# Patient Record
Sex: FEMALE | Race: WHITE | Employment: OTHER | URBAN - METROPOLITAN AREA
[De-identification: names, ages, dates, MRNs, and addresses within clinical notes are randomized per-mention and may not be internally consistent; named-entity substitution may affect disease eponyms.]

---

## 2019-01-11 ENCOUNTER — TRANSFERRED RECORDS (OUTPATIENT)
Dept: HEALTH INFORMATION MANAGEMENT | Facility: CLINIC | Age: 74
End: 2019-01-11

## 2019-04-18 ENCOUNTER — TRANSFERRED RECORDS (OUTPATIENT)
Dept: HEALTH INFORMATION MANAGEMENT | Facility: CLINIC | Age: 74
End: 2019-04-18

## 2019-05-22 ENCOUNTER — TRANSFERRED RECORDS (OUTPATIENT)
Dept: HEALTH INFORMATION MANAGEMENT | Facility: CLINIC | Age: 74
End: 2019-05-22

## 2019-05-22 ENCOUNTER — MEDICAL CORRESPONDENCE (OUTPATIENT)
Dept: HEALTH INFORMATION MANAGEMENT | Facility: CLINIC | Age: 74
End: 2019-05-22

## 2019-05-23 ENCOUNTER — MEDICAL CORRESPONDENCE (OUTPATIENT)
Dept: HEALTH INFORMATION MANAGEMENT | Facility: CLINIC | Age: 74
End: 2019-05-23

## 2019-06-06 ENCOUNTER — OFFICE VISIT (OUTPATIENT)
Dept: OPHTHALMOLOGY | Facility: CLINIC | Age: 74
DRG: 057 | End: 2019-06-06
Attending: OPHTHALMOLOGY
Payer: MEDICARE

## 2019-06-06 ENCOUNTER — ANCILLARY PROCEDURE (OUTPATIENT)
Dept: GENERAL RADIOLOGY | Facility: CLINIC | Age: 74
End: 2019-06-06
Attending: OPHTHALMOLOGY
Payer: MEDICARE

## 2019-06-06 DIAGNOSIS — H15.102 SCLERITIS AND EPISCLERITIS OF LEFT EYE: ICD-10-CM

## 2019-06-06 DIAGNOSIS — H15.002 SCLERITIS AND EPISCLERITIS OF LEFT EYE: ICD-10-CM

## 2019-06-06 DIAGNOSIS — H20.9 UVEITIS OF LEFT EYE: Primary | ICD-10-CM

## 2019-06-06 DIAGNOSIS — H25.13 NUCLEAR AGE-RELATED CATARACT, BOTH EYES: ICD-10-CM

## 2019-06-06 DIAGNOSIS — H20.9 UVEITIS OF LEFT EYE: ICD-10-CM

## 2019-06-06 DIAGNOSIS — H16.9 KERATITIS, BILATERAL: ICD-10-CM

## 2019-06-06 PROBLEM — H90.6 MIXED HEARING LOSS, BILATERAL: Status: ACTIVE | Noted: 2019-02-19

## 2019-06-06 PROCEDURE — 71046 X-RAY EXAM CHEST 2 VIEWS: CPT

## 2019-06-06 PROCEDURE — 86780 TREPONEMA PALLIDUM: CPT | Performed by: OPHTHALMOLOGY

## 2019-06-06 PROCEDURE — 86592 SYPHILIS TEST NON-TREP QUAL: CPT | Performed by: OPHTHALMOLOGY

## 2019-06-06 PROCEDURE — 92250 FUNDUS PHOTOGRAPHY W/I&R: CPT | Mod: ZF | Performed by: OPHTHALMOLOGY

## 2019-06-06 PROCEDURE — 76512 OPH US DX B-SCAN: CPT | Mod: ZF | Performed by: OPHTHALMOLOGY

## 2019-06-06 PROCEDURE — 86780 TREPONEMA PALLIDUM: CPT | Mod: 90 | Performed by: OPHTHALMOLOGY

## 2019-06-06 PROCEDURE — 36415 COLL VENOUS BLD VENIPUNCTURE: CPT | Performed by: OPHTHALMOLOGY

## 2019-06-06 PROCEDURE — 92285 EXTERNAL OCULAR PHOTOGRAPHY: CPT | Mod: ZF | Performed by: OPHTHALMOLOGY

## 2019-06-06 PROCEDURE — 86481 TB AG RESPONSE T-CELL SUSP: CPT | Performed by: OPHTHALMOLOGY

## 2019-06-06 PROCEDURE — 99000 SPECIMEN HANDLING OFFICE-LAB: CPT | Performed by: OPHTHALMOLOGY

## 2019-06-06 PROCEDURE — 92015 DETERMINE REFRACTIVE STATE: CPT | Mod: 52,GY,ZF

## 2019-06-06 PROCEDURE — G0463 HOSPITAL OUTPT CLINIC VISIT: HCPCS | Mod: ZF

## 2019-06-06 RX ORDER — DIPHENHYDRAMINE HCL 25 MG
25 CAPSULE ORAL PRN
Status: ON HOLD | COMMUNITY
Start: 2012-08-24 | End: 2019-06-10

## 2019-06-06 RX ORDER — PREDNISOLONE ACETATE 10 MG/ML
1 SUSPENSION/ DROPS OPHTHALMIC 4 TIMES DAILY
COMMUNITY
Start: 2019-02-11 | End: 2019-06-07

## 2019-06-06 RX ORDER — EPINEPHRINE 0.3 MG/.3ML
0.3 INJECTION SUBCUTANEOUS PRN
Status: ON HOLD | COMMUNITY
Start: 2012-08-24 | End: 2019-06-10

## 2019-06-06 RX ORDER — ASCORBIC ACID 500 MG
500 TABLET ORAL DAILY
Status: ON HOLD | COMMUNITY
End: 2019-06-10

## 2019-06-06 RX ORDER — DIFLUPREDNATE OPHTHALMIC 0.5 MG/ML
1 EMULSION OPHTHALMIC 4 TIMES DAILY
Qty: 5 ML | Refills: 1 | Status: ON HOLD | OUTPATIENT
Start: 2019-06-06 | End: 2019-06-10

## 2019-06-06 RX ORDER — METOPROLOL SUCCINATE 25 MG/1
25 TABLET, EXTENDED RELEASE ORAL DAILY
Status: ON HOLD | COMMUNITY
Start: 2019-04-03 | End: 2019-06-10

## 2019-06-06 RX ORDER — OMEPRAZOLE 20 MG/1
20 TABLET, DELAYED RELEASE ORAL DAILY
Qty: 30 TABLET | Refills: 1 | Status: ON HOLD | OUTPATIENT
Start: 2019-06-06 | End: 2019-06-10

## 2019-06-06 RX ORDER — INDOMETHACIN 25 MG/1
25 CAPSULE ORAL 3 TIMES DAILY
Qty: 90 CAPSULE | Refills: 1 | Status: ON HOLD | OUTPATIENT
Start: 2019-06-06 | End: 2019-06-10

## 2019-06-06 RX ORDER — ACETAMINOPHEN 500 MG
500 TABLET ORAL PRN
Status: ON HOLD | COMMUNITY
End: 2019-06-10

## 2019-06-06 RX ORDER — ASPIRIN 81 MG/1
1 TABLET ORAL DAILY
Status: ON HOLD | COMMUNITY
End: 2019-06-10

## 2019-06-06 ASSESSMENT — CONF VISUAL FIELD
OD_SUPERIOR_NASAL_RESTRICTION: 3
OS_INFERIOR_NASAL_RESTRICTION: 3
OD_INFERIOR_NASAL_RESTRICTION: 3
METHOD: COUNTING FINGERS
OS_SUPERIOR_NASAL_RESTRICTION: 3
OS_SUPERIOR_TEMPORAL_RESTRICTION: 3
OS_INFERIOR_TEMPORAL_RESTRICTION: 3

## 2019-06-06 ASSESSMENT — TONOMETRY
OD_IOP_MMHG: 16
OS_IOP_MMHG: 02
OS_IOP_MMHG: 02
OD_IOP_MMHG: 16
IOP_METHOD: ICARE
IOP_METHOD: ICARE

## 2019-06-06 ASSESSMENT — REFRACTION_WEARINGRX
OD_SPHERE: +1.50
OS_SPHERE: +0.75
OS_CYLINDER: +0.25
OD_ADD: +2.25
SPECS_TYPE: BIFOCAL
OD_AXIS: 180
OS_AXIS: 006
OD_CYLINDER: +0.25
OS_ADD: +2.25

## 2019-06-06 ASSESSMENT — VISUAL ACUITY
OD_CC: 20/150
METHOD: SNELLEN - LINEAR
OS_SC: CF @ 1'
OD_CC: J2
OD_SC: J3
OD_PH_SC: 20/100
OD_PH_CC: 20/100
OD_SC: 20/150
OD_PH_SC+: -1 SLOW

## 2019-06-06 ASSESSMENT — REFRACTION_MANIFEST
OD_SPHERE: +1.50
OD_ADD: +2.50
OD_CYLINDER: +0.25
OD_AXIS: 180
OS_SPHERE: BALANCE

## 2019-06-06 ASSESSMENT — CUP TO DISC RATIO: OD_RATIO: 0.75

## 2019-06-06 NOTE — LETTER
6/6/2019       RE: Mary Carlos  [id number] [facility] [address]     Dear Dr. Conteh,    Thank you for referring your patient, Mary Carlos, to the EYE CLINIC at [facility]. Please see a copy of my visit note below.    Chief Complaint(s) and History of Present Illness(es)     Scleritis Evaluation     Laterality: left eye    Onset: sudden    Onset: 2 years ago    Severity: complete loss of vision (LE per pt)    Frequency: constantly    Timing: throughout the day    Course: stable (Per pt  )    Associated symptoms: eye pain, photophobia and tearing (LE).  Negative   for pain with eye movement, fever, weight loss and fatigue    Treatments tried: warm compresses    Pain scale: 2/10 (As a throbbing ache; intermittent; sometimes no pain at   all per pt. States it can be as high as an 8/10 on pain scale.  Tammy De La Rosa COT 8:13 AM 06/06/2019  )              Comments     Pt Scooby, a rheumatologist, referred pt for scleritis evaluation of LE   Has been seeing Dr. Conteh in [place] for glaucoma and scleritis for   last two years. Last visit with Dr. Conteh 3 weeks ago. Pt using   Prednisolone 4 times a day to BE / Told to use very hot compresses to eyes   immediately following; leaves on eye until compresses cool off. Has been   following this treatment for a week. Patient's daughter states that   multiple different types of glaucoma drops have been used within last two   years, but ineffective to control IOP. Patient states that she has been   told that she also has glaucoma in [place]. At the same time, signs and   symptoms of scleritis and infection manifested. Patient states that this   problem started as soon as she was exposed to strong wind which 'hit her   eyes' one day. Daughter unsure which was first to occur, just that   patient's eye rapidly deteriorated. VA is non-existent as far as patient   and daughter's understanding. States that 'had full vision up until 6   months  "ago'. Daughter states that there is thought to try and control IOP   with glaucoma surgery, but not recommended until full assessment is   complete.   Patient's daughter states that an unnamed, rare auto-immune disease was   discovered by Dr. Almodovar a week ago and he thought it was odd because   patient does not manifest any signs or symptoms related to it.    Tammy De La Rosa COT 8:23 AM 06/06/2019                Review of systems for the eyes was negative other than the pertinent positives/negatives listed in the HPI.      Assessment & Plan      Mary Carlos is a 74 year old female with the following diagnoses:   1. Uveitis of left eye    2. Scleritis and episcleritis of left eye    3. Keratitis, bilateral    4. Nuclear age-related cataract, both eyes       Patient has been followed by Dr. Conteh for about 2 years with waxing and waning left eye redness and pain.  Diagnosed as scleritis and treated with topical CsA, topical prednisolone and topical NSAIDS.  Most recent exam was in March 2019 at which time vision was 20/100 right eye and 20/30 left eye.  Intraocular pressure 11/10 right eye and left eye respectively.  Symptoms were worsening and the patient was referred to Rheumatology (Dr. Scooby Don).  Seen by rheumatology last week with the following labs:    +Anti-nuclear antibody 2010  +SSA and SSB  +HLAB27  +RF (IgA)    Negative for ACE, ANCA, Anti-nuclear antibody (2019), PR3, MPO  No record of Chest x-ray, TB or Syphilis testing (patient reports normal TB while hospitalized in the past)    Rheumatology felt this may represent atypical Sjogren's or Ankylosing Spondylitis.  They planned to discuss with Dr. Conteh prior to starting systemic medications.  She reports to us today with significant worsening in vision and ptosis for 2 weeks.  She reports history of \"glaucoma surgery in both eyes\" 30-35 years ago in [place], [place] (patient does not know what was done).  She feels like the top of the eye has been \"punched\" " and aches constantly.  She is currently using prednisolone four times a day in the left eye for the past two weeks.      Her exam today is markedly worsened with vision 20/150 and Count fingers  (right eye and left eye).  The left eye has 3+ diffuse scleritis without thinning.  There is 360 peripheral corneal haze with minimal thinning and an intact epithelium.  The anterior chamber is shallow with 2+ flare and mild fine cell.  The pupil is completely synechialized and dilates minimally.  Bscan reveals very thick vitreous stranding and thick posterior sclera without T sign.      Differential diagnosis at this time is broad and includes TB, syphilis, sympathetic ophthalmia, VKH, HLAB27 associated sclerouveitis, herpes simples virus.      Plan:   Obtain Chest x-ray, QFG, RPR  Schedule uveitis consult ASAP  Start indomethacin 25 mg orally three times a day   Start PPI for prophylaxis  Switch to durezol four times a day left eye   Pending labs, will consider starting oral steroid  Will discuss with Dr. Riley and Dr. Young      Patient disposition:   Return in about 1 week (around 6/13/2019) for Follow Up. Alina and Kayla      Again, thank you for allowing me to participate in the care of your patient.      Sincerely,        Humphrey Fuller MD  , Comprehensive Ophthalmology  Department of Ophthalmology and Visual Neurosciences  [facility]

## 2019-06-06 NOTE — PATIENT INSTRUCTIONS
Stop Prednisolone drops     Start Durezol 1 drop four times a day in the left eye - please call if unable to get Durezol drops    Start Indomethacin 1 pill three times a day    Start Omeprazole 1 pill daily

## 2019-06-06 NOTE — PROGRESS NOTES
Chief Complaint(s) and History of Present Illness(es)     Scleritis Evaluation     Laterality: left eye    Onset: sudden    Onset: 2 years ago    Severity: complete loss of vision (LE per pt)    Frequency: constantly    Timing: throughout the day    Course: stable (Per pt  )    Associated symptoms: eye pain, photophobia and tearing (LE).  Negative   for pain with eye movement, fever, weight loss and fatigue    Treatments tried: warm compresses    Pain scale: 2/10 (As a throbbing ache; intermittent; sometimes no pain at   all per pt. States it can be as high as an 8/10 on pain scale.  Tammy De La Rosa COT 8:13 AM 06/06/2019  )              Comments     Pt Scooby, a rheumatologist, referred pt for scleritis evaluation of LE   Has been seeing Dr. Conteh in [place] for glaucoma and scleritis for   last two years. Last visit with Dr. Conteh 3 weeks ago. Pt using   Prednisolone 4 times a day to BE / Told to use very hot compresses to eyes   immediately following; leaves on eye until compresses cool off. Has been   following this treatment for a week. Patient's daughter states that   multiple different types of glaucoma drops have been used within last two   years, but ineffective to control IOP. Patient states that she has been   told that she also has glaucoma in [place]. At the same time, signs and   symptoms of scleritis and infection manifested. Patient states that this   problem started as soon as she was exposed to strong wind which 'hit her   eyes' one day. Daughter unsure which was first to occur, just that   patient's eye rapidly deteriorated. VA is non-existent as far as patient   and daughter's understanding. States that 'had full vision up until 6   months ago'. Daughter states that there is thought to try and control IOP   with glaucoma surgery, but not recommended until full assessment is   complete.   Patient's daughter states that an unnamed, rare auto-immune disease was   discovered by Dr. Almodovar a week ago and he  "thought it was odd because   patient does not manifest any signs or symptoms related to it.    Tammy De La Rosa COT 8:23 AM 06/06/2019                Review of systems for the eyes was negative other than the pertinent positives/negatives listed in the HPI.      Assessment & Plan      Mary Carlos is a 74 year old female with the following diagnoses:   1. Uveitis of left eye    2. Scleritis and episcleritis of left eye    3. Keratitis, bilateral    4. Nuclear age-related cataract, both eyes       Patient has been followed by Dr. Conteh for about 2 years with waxing and waning left eye redness and pain.  Diagnosed as scleritis and treated with topical CsA, topical prednisolone and topical NSAIDS.  Most recent exam was in March 2019 at which time vision was 20/100 right eye and 20/30 left eye.  Intraocular pressure 11/10 right eye and left eye respectively.  Symptoms were worsening and the patient was referred to Rheumatology (Dr. Scooby Don).  Seen by rheumatology last week with the following labs:    +Anti-nuclear antibody 2010  +SSA and SSB  +HLAB27  +RF (IgA)    Negative for ACE, ANCA, Anti-nuclear antibody (2019), PR3, MPO  No record of Chest x-ray, TB or Syphilis testing (patient reports normal TB while hospitalized in the past)    Rheumatology felt this may represent atypical Sjogren's or Ankylosing Spondylitis.  They planned to discuss with Dr. Conteh prior to starting systemic medications.  She reports to us today with significant worsening in vision and ptosis for 2 weeks.  She reports history of \"glaucoma surgery in both eyes\" 30-35 years ago in [place], [place] (patient does not know what was done).  She feels like the top of the eye has been \"punched\" and aches constantly.  She is currently using prednisolone four times a day in the left eye for the past two weeks.      Her exam today is markedly worsened with vision 20/150 and Count fingers  (right eye and left eye).  The left eye has 3+ diffuse scleritis " without thinning.  There is 360 peripheral corneal haze with minimal thinning and an intact epithelium.  The anterior chamber is shallow with 2+ flare and mild fine cell.  The pupil is completely synechialized and dilates minimally.  Bscan reveals very thick vitreous stranding and thick posterior sclera without T sign.      Differential diagnosis at this time is broad and includes TB, syphilis, sympathetic ophthalmia, VKH, HLAB27 associated sclerouveitis, herpes simples virus.      Plan:   Obtain Chest x-ray, QFG, RPR  Schedule uveitis consult ASAP  Start indomethacin 25 mg orally three times a day   Start PPI for prophylaxis  Switch to durezol four times a day left eye   Pending labs, will consider starting oral steroid  Will discuss with Dr. Almodovar and Dr. Young      Patient disposition:   Return in about 1 week (around 6/13/2019) for Follow Up. Stella           Attending Physician Attestation:  Complete documentation of historical and exam elements from today's encounter can be found in the full encounter summary report (not reduplicated in this progress note).  I personally obtained the chief complaint(s) and history of present illness.  I confirmed and edited as necessary the review of systems, past medical/surgical history, family history, social history, and examination findings as documented by others; and I examined the patient myself.  I personally reviewed the relevant tests, images, and reports as documented above.  I formulated and edited as necessary the assessment and plan and discussed the findings and management plan with the patient and family. .Attending Physician Image/Tesing Attestation: I personally reviewed the ophthalmic test(s) associated with this encounter, agree with the interpretation(s) as documented by the resident/fellow, and have edited the corresponding report(s) as necessary.   - Humphrey Fuller MD

## 2019-06-06 NOTE — NURSING NOTE
Chief Complaints and History of Present Illnesses   Patient presents with     Scleritis Evaluation     Chief Complaint(s) and History of Present Illness(es)     Scleritis Evaluation     Laterality: left eye    Onset: sudden    Onset: 2 years ago    Severity: complete loss of vision (LE per pt)    Frequency: constantly    Timing: throughout the day    Course: stable (Per pt  )    Associated symptoms: eye pain, photophobia and tearing (LE).  Negative for pain with eye movement, fever, weight loss and fatigue    Treatments tried: warm compresses    Pain scale: 2/10 (As a throbbing ache; intermittent; sometimes no pain at all per pt. States it can be as high as an 8/10 on pain scale.  Tammy De La Rosa COT 8:13 AM 06/06/2019  )              Comments     Chief Complaints and History of Present Illnesses   Patient presents with     Scleritis Evaluation     Chief Complaint(s) and History of Present Illness(es)     Scleritis Evaluation     Laterality: left eye    Onset: sudden    Onset: 2 years ago    Severity: complete loss of vision (LE per pt)    Frequency: constantly    Timing: throughout the day    Course: stable (Per pt  )    Associated symptoms: eye pain, photophobia and tearing (LE).  Negative for pain with eye movement, fever, weight loss and fatigue    Treatments tried: warm compresses    Pain scale: 2/10 (As a throbbing ache; intermittent; sometimes no pain at all per pt. States it can be as high as an 8/10 on pain scale.  Tammy De La Rosa COT 8:13 AM 06/06/2019  )              Comments     Pt Scooby, a rheumatologist, referred pt for scleritis evaluation of LE Has been seeing Dr. Conteh in [place] for glaucoma and scleritis for last two years. Last visit with Dr. Conteh 3 weeks ago. Pt using Prednisolone 4 times a day to BE / Told to use very hot compresses to eyes immediately following; leaves on eye until compresses cool off. Has been following this treatment for a week. Patient's daughter states that multiple  different types of glaucoma drops have been used within last two years, but ineffective to control IOP. Patient states that she has been told that she also has glaucoma in [place]. At the same time, signs and symptoms of scleritis and infection manifested. Patient states that this problem started as soon as she was exposed to strong wind which 'hit her eyes' one day. Daughter unsure which was first to occur, just that patient's eye rapidly deteriorated. VA is non-existent as far as patient and daughter's understanding. States that 'had full vision up until 6 months ago'. Daughter states that there is thought to try and control IOP with glaucoma surgery, but not recommended until full assessment is complete.   Patient's daughter states that an unnamed, rare auto-immune disease was discovered by Dr. Almodovar a week ago and he thought it was odd because patient does not manifest any signs or symptoms related to it.    Tammy BRYANT 8:23 AM 06/06/2019

## 2019-06-07 ENCOUNTER — TELEPHONE (OUTPATIENT)
Dept: OPHTHALMOLOGY | Facility: CLINIC | Age: 74
End: 2019-06-07

## 2019-06-07 ENCOUNTER — HOSPITAL ENCOUNTER (INPATIENT)
Facility: CLINIC | Age: 74
LOS: 4 days | Discharge: NURSING, REHAB OR LONG-TERM CARE | DRG: 057 | End: 2019-06-11
Attending: INTERNAL MEDICINE | Admitting: INTERNAL MEDICINE
Payer: MEDICARE

## 2019-06-07 DIAGNOSIS — A52.3 NEUROSYPHILIS: ICD-10-CM

## 2019-06-07 DIAGNOSIS — M94.9 DISORDER OF BONE AND CARTILAGE: ICD-10-CM

## 2019-06-07 DIAGNOSIS — H44.112 PANUVEITIS OF LEFT EYE: Primary | ICD-10-CM

## 2019-06-07 DIAGNOSIS — A53.9 SYPHILIS: ICD-10-CM

## 2019-06-07 DIAGNOSIS — M12.9 ARTHROPATHY, MULTIPLE SITES: ICD-10-CM

## 2019-06-07 DIAGNOSIS — H20.9 UVEITIS OF LEFT EYE: ICD-10-CM

## 2019-06-07 DIAGNOSIS — Z22.7 TB LUNG, LATENT: ICD-10-CM

## 2019-06-07 DIAGNOSIS — H15.002 SCLERITIS AND EPISCLERITIS OF LEFT EYE: ICD-10-CM

## 2019-06-07 DIAGNOSIS — I10 HTN, GOAL BELOW 140/90: ICD-10-CM

## 2019-06-07 DIAGNOSIS — H15.102 SCLERITIS AND EPISCLERITIS OF LEFT EYE: ICD-10-CM

## 2019-06-07 DIAGNOSIS — M89.9 DISORDER OF BONE AND CARTILAGE: ICD-10-CM

## 2019-06-07 DIAGNOSIS — Z13.6 CARDIOVASCULAR SCREENING; LDL GOAL LESS THAN 160: ICD-10-CM

## 2019-06-07 DIAGNOSIS — T78.2XXD ANAPHYLAXIS, SUBSEQUENT ENCOUNTER: Primary | ICD-10-CM

## 2019-06-07 LAB
RPR SER QL: NONREACTIVE
T PALLIDUM AB SER QL: REACTIVE

## 2019-06-07 PROCEDURE — 40000141 ZZH STATISTIC PERIPHERAL IV START W/O US GUIDANCE

## 2019-06-07 PROCEDURE — 25000132 ZZH RX MED GY IP 250 OP 250 PS 637: Mod: GY | Performed by: STUDENT IN AN ORGANIZED HEALTH CARE EDUCATION/TRAINING PROGRAM

## 2019-06-07 PROCEDURE — A9270 NON-COVERED ITEM OR SERVICE: HCPCS | Mod: GY | Performed by: STUDENT IN AN ORGANIZED HEALTH CARE EDUCATION/TRAINING PROGRAM

## 2019-06-07 PROCEDURE — 99222 1ST HOSP IP/OBS MODERATE 55: CPT | Mod: AI | Performed by: INTERNAL MEDICINE

## 2019-06-07 PROCEDURE — 12000001 ZZH R&B MED SURG/OB UMMC

## 2019-06-07 RX ORDER — LIDOCAINE 40 MG/G
CREAM TOPICAL
Status: DISCONTINUED | OUTPATIENT
Start: 2019-06-07 | End: 2019-06-11 | Stop reason: HOSPADM

## 2019-06-07 RX ORDER — ASCORBIC ACID 500 MG
500 TABLET ORAL DAILY
Status: DISCONTINUED | OUTPATIENT
Start: 2019-06-08 | End: 2019-06-11 | Stop reason: HOSPADM

## 2019-06-07 RX ORDER — DIFLUPREDNATE OPHTHALMIC 0.5 MG/ML
1 EMULSION OPHTHALMIC 4 TIMES DAILY
Status: DISCONTINUED | OUTPATIENT
Start: 2019-06-07 | End: 2019-06-11 | Stop reason: HOSPADM

## 2019-06-07 RX ORDER — NALOXONE HYDROCHLORIDE 0.4 MG/ML
.1-.4 INJECTION, SOLUTION INTRAMUSCULAR; INTRAVENOUS; SUBCUTANEOUS
Status: DISCONTINUED | OUTPATIENT
Start: 2019-06-07 | End: 2019-06-11 | Stop reason: HOSPADM

## 2019-06-07 RX ORDER — INDOMETHACIN 25 MG/1
25 CAPSULE ORAL 3 TIMES DAILY
Status: DISCONTINUED | OUTPATIENT
Start: 2019-06-07 | End: 2019-06-11 | Stop reason: HOSPADM

## 2019-06-07 RX ORDER — DIPHENHYDRAMINE HCL 25 MG
25 CAPSULE ORAL EVERY 6 HOURS PRN
Status: DISCONTINUED | OUTPATIENT
Start: 2019-06-07 | End: 2019-06-11 | Stop reason: HOSPADM

## 2019-06-07 RX ORDER — ACETAMINOPHEN 500 MG
500 TABLET ORAL EVERY 6 HOURS PRN
Status: DISCONTINUED | OUTPATIENT
Start: 2019-06-07 | End: 2019-06-11 | Stop reason: HOSPADM

## 2019-06-07 RX ORDER — ASPIRIN 81 MG/1
81 TABLET ORAL DAILY
Status: DISCONTINUED | OUTPATIENT
Start: 2019-06-08 | End: 2019-06-11 | Stop reason: HOSPADM

## 2019-06-07 RX ORDER — METOPROLOL SUCCINATE 25 MG/1
25 TABLET, EXTENDED RELEASE ORAL DAILY
Status: DISCONTINUED | OUTPATIENT
Start: 2019-06-08 | End: 2019-06-11 | Stop reason: HOSPADM

## 2019-06-07 RX ADMIN — DUREZOL 1 DROP: 0.5 EMULSION OPHTHALMIC at 22:18

## 2019-06-07 RX ADMIN — INDOMETHACIN 25 MG: 25 CAPSULE ORAL at 22:18

## 2019-06-07 ASSESSMENT — ACTIVITIES OF DAILY LIVING (ADL)
RETIRED_EATING: 0-->INDEPENDENT
COGNITION: 0 - NO COGNITION ISSUES REPORTED
TOILETING: 0-->INDEPENDENT
FALL_HISTORY_WITHIN_LAST_SIX_MONTHS: NO
DRESS: 0-->INDEPENDENT
TRANSFERRING: 0-->INDEPENDENT
BATHING: 0-->INDEPENDENT
RETIRED_COMMUNICATION: 0-->UNDERSTANDS/COMMUNICATES WITHOUT DIFFICULTY
AMBULATION: 1-->ASSISTIVE EQUIPMENT
SWALLOWING: 0-->SWALLOWS FOODS/LIQUIDS WITHOUT DIFFICULTY

## 2019-06-07 NOTE — TELEPHONE ENCOUNTER
Discussed results of blood work and chest x-ray from yesterday.  TB testing is still pending.     +Treponemal AB with negative RPR.  Given ocular findings we must treat the current panuveitis as syphilis until proven otherwise.  Discussed with Dr. Pearson in infectious disease and Dr. Young.  Plan to admit to hospital to start IV treatment.  Will consult ophthalmology and ID on admission.  Likely plan to start oral prednisone after 48 hours of IV penicillin administration.    Discussed with patient's daughter to report to [facility] for admission/treatment. Questions answered

## 2019-06-07 NOTE — LETTER
Health Information Management Services               Recipient:    [facility] in admissions      Sender:    SCOTTIE Villa, LSW  5B   Pager [phone]  Phone [phone]        Date: June 11, 2019  Patient Name:  Mary Carlos  Routing Message:      Discharge orders       The documents accompanying this notice contain confidential information belonging to the sender.  This information is intended only for the use of the individual or entity named above.  The authorized recipient of this information is prohibited from disclosing this information to any other party and is required to destroy the information after its stated need has been fulfilled, unless otherwise required by state law.      If you are not the intended recipient, you are hereby notified that any disclosure, copy, distribution or action taken in reliance on the contents of these documents is strictly prohibited.  If you have received this document in error, please return it by fax to [phone] with a note on the cover sheet explaining why you are returning it (e.g. not your patient, not your provider, etc.).  If you need further assistance, please call [place] Centralized Transcription at [phone].  Documents may also be returned by mail to [employer], , [facility] [address], [place], [place] [address].

## 2019-06-08 ENCOUNTER — OFFICE VISIT (OUTPATIENT)
Dept: OPHTHALMOLOGY | Facility: CLINIC | Age: 74
End: 2019-06-08
Payer: MEDICARE

## 2019-06-08 DIAGNOSIS — H15.002 SCLERITIS AND EPISCLERITIS OF LEFT EYE: ICD-10-CM

## 2019-06-08 DIAGNOSIS — A53.9 SYPHILIS: ICD-10-CM

## 2019-06-08 DIAGNOSIS — H15.102 SCLERITIS AND EPISCLERITIS OF LEFT EYE: ICD-10-CM

## 2019-06-08 DIAGNOSIS — H44.112 PANUVEITIS OF LEFT EYE: Primary | ICD-10-CM

## 2019-06-08 LAB
ANION GAP SERPL CALCULATED.3IONS-SCNC: 4 MMOL/L (ref 3–14)
BUN SERPL-MCNC: 13 MG/DL (ref 7–30)
CALCIUM SERPL-MCNC: 8.1 MG/DL (ref 8.5–10.1)
CHLORIDE SERPL-SCNC: 112 MMOL/L (ref 94–109)
CO2 SERPL-SCNC: 25 MMOL/L (ref 20–32)
CREAT SERPL-MCNC: 0.9 MG/DL (ref 0.52–1.04)
ERYTHROCYTE [DISTWIDTH] IN BLOOD BY AUTOMATED COUNT: 12.5 % (ref 10–15)
GFR SERPL CREATININE-BSD FRML MDRD: 63 ML/MIN/{1.73_M2}
GLUCOSE SERPL-MCNC: 93 MG/DL (ref 70–99)
HCT VFR BLD AUTO: 36.8 % (ref 35–47)
HGB BLD-MCNC: 11.6 G/DL (ref 11.7–15.7)
MCH RBC QN AUTO: 31.6 PG (ref 26.5–33)
MCHC RBC AUTO-ENTMCNC: 31.5 G/DL (ref 31.5–36.5)
MCV RBC AUTO: 100 FL (ref 78–100)
PLATELET # BLD AUTO: 215 10E9/L (ref 150–450)
POTASSIUM SERPL-SCNC: 4.3 MMOL/L (ref 3.4–5.3)
RBC # BLD AUTO: 3.67 10E12/L (ref 3.8–5.2)
SODIUM SERPL-SCNC: 141 MMOL/L (ref 133–144)
WBC # BLD AUTO: 4.5 10E9/L (ref 4–11)

## 2019-06-08 PROCEDURE — 87389 HIV-1 AG W/HIV-1&-2 AB AG IA: CPT | Performed by: STUDENT IN AN ORGANIZED HEALTH CARE EDUCATION/TRAINING PROGRAM

## 2019-06-08 PROCEDURE — A9270 NON-COVERED ITEM OR SERVICE: HCPCS | Mod: GY | Performed by: STUDENT IN AN ORGANIZED HEALTH CARE EDUCATION/TRAINING PROGRAM

## 2019-06-08 PROCEDURE — 99233 SBSQ HOSP IP/OBS HIGH 50: CPT | Mod: GC | Performed by: INTERNAL MEDICINE

## 2019-06-08 PROCEDURE — 12000001 ZZH R&B MED SURG/OB UMMC

## 2019-06-08 PROCEDURE — 36415 COLL VENOUS BLD VENIPUNCTURE: CPT | Performed by: STUDENT IN AN ORGANIZED HEALTH CARE EDUCATION/TRAINING PROGRAM

## 2019-06-08 PROCEDURE — 25000125 ZZHC RX 250: Performed by: STUDENT IN AN ORGANIZED HEALTH CARE EDUCATION/TRAINING PROGRAM

## 2019-06-08 PROCEDURE — 25000132 ZZH RX MED GY IP 250 OP 250 PS 637: Mod: GY | Performed by: STUDENT IN AN ORGANIZED HEALTH CARE EDUCATION/TRAINING PROGRAM

## 2019-06-08 PROCEDURE — 25800030 ZZH RX IP 258 OP 636: Performed by: STUDENT IN AN ORGANIZED HEALTH CARE EDUCATION/TRAINING PROGRAM

## 2019-06-08 PROCEDURE — 80048 BASIC METABOLIC PNL TOTAL CA: CPT | Performed by: STUDENT IN AN ORGANIZED HEALTH CARE EDUCATION/TRAINING PROGRAM

## 2019-06-08 PROCEDURE — 85027 COMPLETE CBC AUTOMATED: CPT | Performed by: STUDENT IN AN ORGANIZED HEALTH CARE EDUCATION/TRAINING PROGRAM

## 2019-06-08 PROCEDURE — 25000128 H RX IP 250 OP 636: Performed by: STUDENT IN AN ORGANIZED HEALTH CARE EDUCATION/TRAINING PROGRAM

## 2019-06-08 RX ORDER — CYCLOPENTOLATE HYDROCHLORIDE 10 MG/ML
1 SOLUTION/ DROPS OPHTHALMIC 2 TIMES DAILY
Status: DISCONTINUED | OUTPATIENT
Start: 2019-06-08 | End: 2019-06-11 | Stop reason: HOSPADM

## 2019-06-08 RX ORDER — CYCLOPENTOLATE HYDROCHLORIDE 5 MG/ML
1 SOLUTION/ DROPS OPHTHALMIC 2 TIMES DAILY
Status: DISCONTINUED | OUTPATIENT
Start: 2019-06-08 | End: 2019-06-08 | Stop reason: RX

## 2019-06-08 RX ADMIN — CYCLOPENTOLATE HYDROCHLORIDE 1 DROP: 10 SOLUTION OPHTHALMIC at 20:51

## 2019-06-08 RX ADMIN — OXYCODONE HYDROCHLORIDE AND ACETAMINOPHEN 500 MG: 500 TABLET ORAL at 08:20

## 2019-06-08 RX ADMIN — ASPIRIN 81 MG: 81 TABLET, COATED ORAL at 08:20

## 2019-06-08 RX ADMIN — INDOMETHACIN 25 MG: 25 CAPSULE ORAL at 08:20

## 2019-06-08 RX ADMIN — SODIUM CHLORIDE 4 MILLION UNITS: 9 INJECTION, SOLUTION INTRAVENOUS at 12:31

## 2019-06-08 RX ADMIN — SODIUM CHLORIDE 4 MILLION UNITS: 9 INJECTION, SOLUTION INTRAVENOUS at 00:30

## 2019-06-08 RX ADMIN — DUREZOL 1 DROP: 0.5 EMULSION OPHTHALMIC at 12:31

## 2019-06-08 RX ADMIN — SODIUM CHLORIDE 4 MILLION UNITS: 9 INJECTION, SOLUTION INTRAVENOUS at 16:42

## 2019-06-08 RX ADMIN — SODIUM CHLORIDE 4 MILLION UNITS: 9 INJECTION, SOLUTION INTRAVENOUS at 08:20

## 2019-06-08 RX ADMIN — SODIUM CHLORIDE 4 MILLION UNITS: 9 INJECTION, SOLUTION INTRAVENOUS at 20:42

## 2019-06-08 RX ADMIN — DUREZOL 1 DROP: 0.5 EMULSION OPHTHALMIC at 16:47

## 2019-06-08 RX ADMIN — DUREZOL 1 DROP: 0.5 EMULSION OPHTHALMIC at 08:20

## 2019-06-08 RX ADMIN — OMEPRAZOLE 20 MG: 20 CAPSULE, DELAYED RELEASE ORAL at 08:20

## 2019-06-08 RX ADMIN — DUREZOL 1 DROP: 0.5 EMULSION OPHTHALMIC at 20:45

## 2019-06-08 RX ADMIN — CYCLOPENTOLATE HYDROCHLORIDE 1 DROP: 10 SOLUTION OPHTHALMIC at 13:31

## 2019-06-08 RX ADMIN — INDOMETHACIN 25 MG: 25 CAPSULE ORAL at 20:46

## 2019-06-08 RX ADMIN — METOPROLOL SUCCINATE 25 MG: 25 TABLET, EXTENDED RELEASE ORAL at 08:20

## 2019-06-08 RX ADMIN — INDOMETHACIN 25 MG: 25 CAPSULE ORAL at 13:32

## 2019-06-08 RX ADMIN — SODIUM CHLORIDE 4 MILLION UNITS: 9 INJECTION, SOLUTION INTRAVENOUS at 04:22

## 2019-06-08 ASSESSMENT — ACTIVITIES OF DAILY LIVING (ADL)
ADLS_ACUITY_SCORE: 12

## 2019-06-08 ASSESSMENT — VISUAL ACUITY
OD_SC: 20/150
METHOD: SNELLEN - LINEAR
OS_SC: CF 6"

## 2019-06-08 ASSESSMENT — TONOMETRY
OD_IOP_MMHG: 13
IOP_METHOD: TONOPEN
OS_IOP_MMHG: 5

## 2019-06-08 ASSESSMENT — CONF VISUAL FIELD
OS_INFERIOR_NASAL_RESTRICTION: 3
OS_INFERIOR_TEMPORAL_RESTRICTION: 3
OS_SUPERIOR_TEMPORAL_RESTRICTION: 3
OD_SUPERIOR_NASAL_RESTRICTION: 3
OD_INFERIOR_NASAL_RESTRICTION: 3
OS_SUPERIOR_NASAL_RESTRICTION: 3

## 2019-06-08 NOTE — PLAN OF CARE
Time:1900-0730      Reason for admission: Neurosyphilis (admit 6-7)  Vitals: Temp: 96  F (35.6  C) Temp src: Oral BP: 162/61 Pulse: 57   Resp: 16          Activity: Independent, uses cane when walking in [place]  Pain: denies pain  Neuro: A/o x4  Cardiac: WDL  Respiratory: WDL  GI/: Voiding independently, last BM 6-7 AM  Diet: Regular  Lines: R PIV, cannot use L arm due to mastectomy   Skin/Wounds: WDL, L mastectomy scarring         New changes this shift: Admission from home, Penicillin Q4 hr, I.D consult ordered, Opthalmology consult ordered.     Plan: Going to Ophthalmology clinic 9th floor PWB per Dr. Binh Wright at 0930 on 6/8.     Continue to monitor and follow POC.

## 2019-06-08 NOTE — PLAN OF CARE
/53 (BP Location: Right arm)   Pulse 57   Temp 97.1  F (36.2  C) (Oral)   Resp 16   SpO2 100%     Time 4244-7372      Reason for admission: Neuro syphilis  Neurosyphilis  Vitals: VSS on RA  Activity: Up Ind  Pain: Denies pain  Neuro: A&Ox4,  speech logical  Mood/Behavior: calm, cooperative  Cardiac: RRR, no murmurs, no edema BLE  Respiratory: LS clear  GI/: BM last night, voiding without difficulty  Diet:Regular--lactose intolerant  Lines: PIV  Skin: CDI      New changes this shift: Pt had eye appt this a.m. IV abx given x2.         Plan: Possible discharge tomorrow. Continue to monitor and follow POC.

## 2019-06-08 NOTE — PROGRESS NOTES
HPI: Mary Carlos is a 74 year old female who presents for f/u of panuveitis of the left eye and positive initial syphilis testing. See Dr Fuller's note from 6/7 for greater details.    Assessment & Plan      Mary Carlos is a 74 year old female with the following diagnoses:   1. Panuveitis of left eye    2. Syphilis    3. Scleritis and episcleritis of left eye       Differential diagnosis at this time is broad and includes TB, syphilis, sympathetic ophthalmia, VKH, HLAB27 associated sclerouveitis, herpes simples virus. With positive initial Syphilis test, it is important to treat as though this is Neurosyphilis at least until confirmatory syphilis testing results.    Subjectively stable vision each eye. Exam is stable.    Plan:   Chest x-ray normal  QFG, RPR pending  Dr Fuller discussed pt w/ Uveitis specialist, Dr. Young - has appt w/ her 6/28/19  - Appreciate ID and Medicine input and recs  - Continue indomethacin 25 mg orally three times a day   - Continue PPI for prophylaxis  - Continue durezol four times a day left eye   - Start 60 mg oral prednisone upon completion of 48 hours of neurosyphilis treatment (discussed with ID and primary teams)        Patient disposition:   Keep f/u appts on Thursday with Dr Fuller and sandra Betancourt sooner    Binh Adams MD  Department of Ophthalmology - PGY2    Discussed w/ Dr Fuller.    Not seen by staff during this visit, available should need have arisen.  Plan appropriate as above.    Humphrey Fuller MD  , Comprehensive Ophthalmology  Department of Ophthalmology and Visual Neurosciences  [facility]

## 2019-06-08 NOTE — CONSULTS
[facility] ID Service: Initial Consultation     Patient:  Mary Carlos, Date of birth 1945, Medical record number [id number]  Date of Visit:  June 8, 2019  Consult Requested by: Emil Charles MD         Assessment and Recommendations:     Problem List:  1. 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 Uveitis, scleritis of L eye    Recommendations:  1. Continue penicillin, empiric therapy for neurosyphilis  2. Awaiting confirmatory testing for treponemal testing.   - If this returns negative, would assume a false negative initial test and stop therapy   - If positive, will treat a course for neurosyphilis  3. Management of eye complications per ophtho team    Discussion: 75 yo F that presented from ophthalmology clinic referral due to indeterminite syphilis screen in the setting of L eye uveitis/scleritis/episcleritis and bilateral keratitis. Her syphilis testing came back with discordant results, positive treponemal and negative RPR. Confirmatory testing is pending at this time. Her ophthalmologists would like to start her on immunosuppresive therapy that could be harmful if this were a manifestation of ocular syphilis, which they believe it could be consistent with. My understanding is that this is time-critical therapy given evolution of her eye disease, and that they would feel safe to proceed after syphilis has been ruled out or after she has been initiated on therapy. For these reasons, the decision was made to admit her for empiric therapy. My feeling is that if the confirmatory treponemal test returns and is negative, we can conclusively say that she does not have syphilis and stop therapy. If this comes back positive, we would complete a course for neurosyphilis.    We will continue to follow.  Call anytime with questions or concerns.    Humphrey Pearson MD  ID Red Service  [phone]        History of Present Illness:     Mary Carlos is a 74F that presented from  "ophthalmology clinic referral due to positive RPR syphilis antibody screen in the setting of L eye uveitis/scleritis/episcleritis and bilateral keratitis. Her ophthalmologist Dr. Fuller, saw her in clinic yesterday. She has been followed for about 2 years with waxing and waning left eye redness and pain.  Diagnosed as scleritis and treated with topical CsA, topical prednisolone and topical NSAIDS.  Exam in March 2019 was 20/100 right eye and 20/30 left eye. Symptoms were worsening and the patient was referred to Rheumatology (Dr. Scooby Don).      With rheum, her labs were noted as  +Anti-nuclear antibody (2010), +SSA and SSB, +HLAB27, +RF (IgA). She was negative for ACE, ANCA, Anti-nuclear antibody (2019), PR3, MPO. Rheumatology initially felt this may represent atypical Sjogren's or Ankylosing Spondylitis.  She states that her right eye vision remains quite blurry though, with ability to see colors and basic outlines. She has been using prednisolone four times a day in the left eye for the past two weeks. She also reported history of \"glaucoma surgery in both eyes\" 30-35 years ago (patient does not know what was done). Differential diagnosis per Dr. Fuller included TB, syphilis, sympathetic ophthalmia, VKH, HLAB27 associated sclerouveitis, herpes simples virus and other etiologies.       Ophtho yesterday ordered QFG (negative), treponema ab (positive, with RPR reflex negative), continued prednisolone, and started indomethacin 25 mg oral TID, PPI for prophylaxis, durezol four times a day left eye.  Because the treponema ab came back positive, there was concern that her eye manifestation could be neurosyphilis. After discussions between Dr. Fuller and myself, we made the decision to admit the patient for empiric therapy for neurosyphilis to facilitate treatment of her eye disease.         Review of Systems:     Full 10 point ROS obtained, pertinent positives and negatives as above.       Past Medical History: "     Past Medical History:   Diagnosis Date     Allergic rhinitis      Arthropathy      Breast cancer (H) 2012     Glaucoma      HTN, goal below 140/90      Intestinal malabsorption      Scleritis      Past Surgical History:   Procedure Laterality Date     APPENDECTOMY       ARTHROSCOPY KNEE       C BREAST RECONSTRUCT W TRAM 1 PEDICL       C TOTAL KNEE ARTHROPLASTY  2008    Bilateral     HYSTERECTOMY TOTAL ABDOMINAL      Benign      MASTECTOMY  2012       Allergies:      Allergies   Allergen Reactions     Cephalexin      Other reaction(s): Sedation     Codeine Sulfate      Diatrizoate Anaphylaxis     Ibuprofen Sodium      Iodamide      Ivp Dye [Contrast Dye]      Lactose Anaphylaxis     No Clinical Screening - See Comments Anaphylaxis     flu shot     Sulfamethoxazole-Trimethoprim Hives     Other reaction(s): Sedation     Codeine      Other reaction(s): GI Bleeding     Influenza Virus Vaccine H5n1      Iodine      Other reaction(s): Agitation     Thimerosal      Other reaction(s): GI Upset     Tramadol Hives     hives            Current Antimicrobials:            Family History:     Family History   Problem Relation Age of Onset     Unknown/Adopted Mother      Unknown/Adopted Father      Glaucoma No family hx of      Diabetes No family hx of      Macular Degeneration No family hx of             Social History:     Social History     Socioeconomic History     Marital status: Single     Spouse name: Not on file     Number of children: Not on file     Years of education: Not on file     Highest education level: Not on file   Occupational History     Not on file   Social Needs     Financial resource strain: Not on file     Food insecurity:     Worry: Not on file     Inability: Not on file     Transportation needs:     Medical: Not on file     Non-medical: Not on file   Tobacco Use     Smoking status: Former Smoker     Smokeless tobacco: Never Used   Substance and Sexual Activity     Alcohol use: No     Drug use: No      Sexual activity: Never   Lifestyle     Physical activity:     Days per week: Not on file     Minutes per session: Not on file     Stress: Not on file   Relationships     Social connections:     Talks on phone: Not on file     Gets together: Not on file     Attends Congregational service: Not on file     Active member of club or organization: Not on file     Attends meetings of clubs or organizations: Not on file     Relationship status: Not on file     Intimate partner violence:     Fear of current or ex partner: Not on file     Emotionally abused: Not on file     Physically abused: Not on file     Forced sexual activity: Not on file   Other Topics Concern     Parent/sibling w/ CABG, MI or angioplasty before 65F 55M? Not Asked   Social History Narrative     Not on file              Physical Exam:   Ranges forvital signs:  Temp:  [96  F (35.6  C)-97.1  F (36.2  C)] 97.1  F (36.2  C)  Pulse:  [57] 57  Resp:  [16] 16  BP: (127-162)/(53-77) 127/53  SpO2:  [100 %] 100 %  No intake or output data in the 24 hours ending 06/08/19 1154    Exam:  GENERAL:  well-developed, well-nourished, sitting in bed in no acute distress.   ENT:  Head is normocephalic, atraumatic. Oropharynx is moist without exudates or ulcers.  EYES:  Eyes have anicteric sclerae.    NECK:  Supple.  LUNGS:  Clear to auscultation.  CARDIOVASCULAR:  Regular rate and rhythm with no murmurs, gallops or rubs.  ABDOMEN:  Normal bowel sounds, soft, nontender.  EXT: Extremities warm and without edema.  SKIN:  No acute rashes.  Line is in place without any surrounding erythema.  NEUROLOGIC:  Grossly nonfocal.         Laboratory Data:     Inflammatory Markers  No lab results found.    Hematology Studies    Recent Labs   Lab Test 06/08/19  0626 01/02/15  1006   WBC 4.5 5.4   ANEU  --  3.9   AEOS  --  0.1   HGB 11.6* 14.3    96    223       Immune Globulin Studies  No lab results found.    Metabolic Studies     Recent Labs   Lab Test 06/08/19 0626  01/02/15  1006    143   POTASSIUM 4.3 4.3   CHLORIDE 112* 110*   CO2 25 25   BUN 13 9   CR 0.90 0.85   GFRESTIMATED 63 66       Hepatic Studies    Recent Labs   Lab Test 01/02/15  1006   BILITOTAL 1.1   ALKPHOS 145   ALBUMIN 3.5   AST 16   ALT 17       Thyroid Studies    Recent Labs   Lab Test 01/02/15  1006   TSH 0.82       Microbiology:  No results found for: CULT    Urine Studies  No lab results found.    Vancomycin Levels  No lab results found.    Invalid input(s): VANCO    Toxoplasma Testing  No lab results found.    Invalid input(s): TOXPL, TOXABM, TOXPCR    Hepatitis B Testing No lab results found.  Hepatitis C Testing   No results found for: HCVAB, HQTG, HCGENO, HCPCR, HQTRNA, HEPRNA  Respiratory Virus Testing    No results found for: RS, FLUAG         Imaging:     CXR 6/6/19: IMPRESSION: No acute abnormality. Lungs are well-inflated and clear.  Heart size is normal.

## 2019-06-08 NOTE — H&P
"Internal Medicine History and Physical  [facility], [place]  Date of Admission: June 7, 2019  Chief Complaint: R eye worsening visio. L eye blindness  -----------------------------------------------------------------  History of Present Illness   74F presenting from OP ophthalmology referral due to positive RPR syphilis antibody screen in the setting of L eye uveitis/scleritis/episcleritis and bilateral keratitis and age-related cataracts,  hypertension, breast cancer, osteoarthritis.    Per ophthalmologist Dr. Fuller, she has been followed for about 2 years with waxing and waning left eye redness and pain.  Diagnosed as scleritis and treated with topical CsA, topical prednisolone and topical NSAIDS.  Exam in March 2019 was 20/100 right eye and 20/30 left eye. Symptoms were worsening and the patient was referred to Rheumatology (Dr. Scooby Don).     With rhem, her labs were noted as  +Anti-nuclear antibody (2010), +SSA and SSB, +HLAB27, +RF (IgA). She was negative for ACE, ANCA, Anti-nuclear antibody (2019), PR3, MPO. Rheumatology initially felt this may represent atypical Sjogren's or Ankylosing Spondylitis.      Then, with Dr. Fuller of ophtho yesterday she reported worsening in vision and ptosis for 2 weeks, like the top of the eye has been \"punched\" and was aching constantly. Currently, she does not endorse any pain or aching, however. She states that her right eye vision remains quite blurry though, with ability to see colors and basic outlines. She has been using prednisolone four times a day in the left eye for the past two weeks. She also reported history of \"glaucoma surgery in both eyes\" 30-35 years ago (patient does not know what was done). Differential diagnosis per Dr. Fuller included TB, syphilis, sympathetic ophthalmia, VKH, HLAB27 associated sclerouveitis, herpes simples virus and other etiologies.      Ophtho yesterday ordered QFG (negative), treponema ab (positive, with " RPR reflex negative), continued prednisolone, and started indomethacin 25 mg oral TID, PPI for prophylaxis, durezol four times a day left eye. Because the treponema ab came back positive, she was called today and instructed to come in for emergent antibiotic therapy [facility].    She is of  and  heritage, born in [place], never left the , and has not been sexually active since her late 30s. She enjoys [facility] and [place].  -----------------------------------------------------------------  Assessment & Plan   74F presenting from OP ophthalmology referral due to positive RPR syphilis antibody screen in the setting of L eye uveitis/scleritis/episcleritis and bilateral keratitis and age-related cataracts,  hypertension, breast cancer, osteoarthritis.    Positive treponema antibody  Per ID this evening, it is not clear if this is [facility]philis but [facility] then [facility] should likely [facility]. No urgent labs to obtain, no need for LP (would not ).  Per the lab guide, if the Anti-Treponema Antibody is positive and the RPR is negative, then a second Treponema specific test (TP-PA) will be performed to determine whether the antibody test is falsely positive or is detecting early infection (though I am unclear on the status of this test).  If the latter is suspected, repeat testing in approximately two weeks is recommended.     [facility] may consider steroids after antibiotics are started (treatment with oral or topical glucocorticoids for ocular syphilis and oral glucocorticoids for otosyphilis is often used in conjunction with antibiotics). Of note, neurological worsening after beginning treatment for neurosyphilis has been attributed to a Jarisch-Herxheimer reaction, which may be most common in patients treated for syphilitic dementia, and we will monitor for this. No new gait disturbances for  "her, though she does have memory issues.     Lastly, there apparently has been an increase in ocular syphilis in the [place], mostly in men (93% of patients), and more likely in MSM and HIV populations (MMWRMorb Mortal Wkly Rep. 2016 Nov 4;65(85):8340-2984).    Plan  - Abx: penicillin G IV 4 million units q4hrs  - Samaria would like to have her transported to ophthalmology clinic tomorrow 9:30am. with Dr. Binh Adams, 9th floor PWB. They have special equipment there that will be useful.  - Follow up with ID/lab if there is any further testing needed at this time  - samaria GOINS consults appreciated  - She does have a keflex allergy of \"sedation\" that we note and so will monitor experience with penicillin  - HIV testing    Diet: regular  Cronin Catheter: not present  DVT Prophylaxis: low risk  Code Status: DNR/DNI discussed  Expected discharge: 2 - 3 days +  The patient was discussed with Dr. Vito Jain MD  [facility]  Pager: [phone]  -----------------------------------------------------------------  Physical Exam   /77 (BP Location: Right arm)   Temp 96.3  F (35.7  C) (Oral)     General Appearance: NAD  HEENT: L eyelids closed, but able to open manually to visualize some mild whitish material around eye and diffuse scleritis and corneal haze, minimal reaction to light; R eye with scleritis, corneal haze, reactive to light  Respiratory: CTAB  Cardiovascular: RRR, no murmurs  GI: abdomen nontender, nondistended, BS+  Lymph/Hematologic: no LAD  Genitourinary: not examined  Skin: no rashes   Psychiatric: appropriate affect    -----------------------------------------------------------------  Additional Patient History  Review of Systems   The 10 point Review of Systems is negative other than noted in the HPI or here.    Past Medical History    I have reviewed this patient's medical history and updated it with pertinent information if needed.   Past Medical History:   Diagnosis " Date     Allergic rhinitis      Arthropathy      Breast cancer (H) 2012     Glaucoma      HTN, goal below 140/90      Intestinal malabsorption      Scleritis        Past Surgical History   I have reviewed this patient's surgical history and updated it with pertinent information if needed.  Past Surgical History:   Procedure Laterality Date     APPENDECTOMY       ARTHROSCOPY KNEE       C BREAST RECONSTRUCT W TRAM 1 PEDICL       C TOTAL KNEE ARTHROPLASTY  2008    Bilateral     HYSTERECTOMY TOTAL ABDOMINAL      Benign      MASTECTOMY  2012       Social History   Social History     Tobacco Use     Smoking status: Former Smoker     Smokeless tobacco: Never Used   Substance Use Topics     Alcohol use: No     Drug use: No       Family History   I have reviewed this patient's family history and updated it with pertinent information if needed.   Family History   Problem Relation Age of Onset     Unknown/Adopted Mother      Unknown/Adopted Father      Glaucoma No family hx of      Diabetes No family hx of      Macular Degeneration No family hx of        Prior to Admission Medications     No current facility-administered medications on file prior to encounter.   Current Outpatient Medications on File Prior to Encounter:  acetaminophen (TYLENOL) 500 MG tablet Take 500 mg by mouth as needed   aspirin 81 MG EC tablet Take 1 tablet by mouth daily   Blood Pressure Monitoring (BLOOD PRESSURE KIT) KIT 1 kit as needed   difluprednate (DUREZOL) 0.05 % ophthalmic emulsion Place 1 drop Into the left eye 4 times daily   diphenhydrAMINE (BENADRYL) 25 MG capsule Take 25 mg by mouth as needed   EPINEPHrine (EPIPEN 2-ANJU) 0.3 MG/0.3ML injection 2-pack Inject 0.3 mg into the muscle as needed   indomethacin (INDOCIN) 25 MG capsule Take 1 capsule (25 mg) by mouth 3 times daily   metoprolol (TOPROL-XL) 25 MG 24 hr tablet Take 1 tablet (25 mg) by mouth daily   metoprolol succinate ER (TOPROL-XL) 25 MG 24 hr tablet Take 25 mg by mouth daily    omeprazole (PRILOSEC OTC) 20 MG EC tablet Take 1 tablet (20 mg) by mouth daily   vitamin C (ASCORBIC ACID) 500 MG tablet Take 500 mg by mouth daily       Allergies      Allergies   Allergen Reactions     Cephalexin      Other reaction(s): Sedation     Codeine Sulfate      Diatrizoate Anaphylaxis     Ibuprofen Sodium      Iodamide      Ivp Dye [Contrast Dye]      Lactose Anaphylaxis     No Clinical Screening - See Comments Anaphylaxis     flu shot     Sulfamethoxazole-Trimethoprim Hives     Other reaction(s): Sedation     Codeine      Other reaction(s): GI Bleeding     Influenza Virus Vaccine H5n1      Iodine      Other reaction(s): Agitation     Thimerosal      Other reaction(s): GI Upset     Tramadol Hives     hives       Data: Review in Epic.

## 2019-06-08 NOTE — PROGRESS NOTES
**PATRICIA mera would like to have her transported to ophthalmology clinic tomorrow 9:30am. with Dr. Binh Adams, 9th floor PWB.

## 2019-06-08 NOTE — PROGRESS NOTES
[facility]    Progress Note - [facility] Service        Date of Admission:  6/7/2019    Assessment & Plan   Mary Carlos is a 74F presenting from OP ophthalmology referral due to positive RPR syphilis antibody screen in the setting of L eye uveitis/scleritis/episcleritis c/f occular syphilis.      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 Pt with multiple rheumatological markers (+Anti-nuclear antibody 2010, +SSA and SSB, +HLAB27, +RF). + Treponema antibodies with negative reflex RPR. Confirmatory process pending. Denies h/o syphilis tx, <5 lifetime partners, no h/o STD/STIs, no h/o genital ulcers or lesions. Does not recall last HIV test. For purpose of tx, syphilitic eye disease considered a subset of neurosyphilis for management.   - Cont Abx: penicillin G IV 4 million units q4hrs  - ID, ophtho consults appreciated: per unofficial ID recs: at this time continue abx while we await confirmatory testing  - HIV testing pending     Diet: Regular Diet Adult No Milk to Drink (No Milk on tray)    Fluids: PO  Lines: PIV  DVT Prophylaxis: Low Risk/Ambulatory with no VTE prophylaxis indicated  Cronin Catheter: not present  Code Status: DNR/DNI      Disposition Plan   Expected discharge: Tomorrow, recommended to prior living arrangement once adequate pain management/ tolerating PO medications.  Entered: Madie Rousseau MD 06/08/2019, 2:04 PM     The patient's care was discussed with the Attending Physician, Dr. Charles.    MD [facility] 1 Service  [facility], [place]  Pager: [phone]  Please see sticky note for cross cover information  ______________________________________________________________________    Interval History   No events overnight. Nursing notes  reviewed. Pt w/ improved eye pain, no changes to vision. Denies other joint pains, changes in skin, bowel or bladder dysfunctions    Data reviewed today: I reviewed all medications, new labs and imaging results over the last 24 hours. I personally reviewed no images or EKG's today.    Physical Exam   Vital Signs: Temp: 96.5  F (35.8  C) Temp src: Oral BP: 146/48 Pulse: 63   Resp: 16 SpO2: 99 % O2 Device: None (Room air)    Weight: 0 lbs 0 oz  General Appearance: Alert, pleasant, lying in bed  HEENT: L eye w/ ptosis, erythema (scleritis) improved bilaterally. EOMI  Respiratory: CTAB   Cardiovascular: RRR no appreciable murmurs   GI: Abd soft, NT ND, normoactive BS  Skin: No tender, warm, erythematous joints. No lesions or rashes seen.  Other: Affect normal    Data   Recent Labs   Lab 06/08/19  0626   WBC 4.5   HGB 11.6*            POTASSIUM 4.3   CHLORIDE 112*   CO2 25   BUN 13   CR 0.90   ANIONGAP 4   MAGGY 8.1*   GLC 93         Internal Medicine Staff Addendum  Date of Service: 6/8/2019  I have seen and examined Ms. Carlos, reviewed the data and discussed the plan of care with the patient, his family, and the care team on P&FC Rounds.  I agree with the above documentation     I discussed pt's care with bedside RN, case management/social work today.  I personally reviewed labs, medications and past 24 hr notes.  Assessment/Plan/Diagnoses: plan/dx as above, which contains my edits and reflects our joint medical decision-making.     Emil Charles MD  Internal Medicine/Pediatrics Hospitalist & Staff Physician   of Internal Medicine and Pediatrics  [facility]  Pager: [phone]

## 2019-06-09 PROCEDURE — 25800030 ZZH RX IP 258 OP 636: Performed by: STUDENT IN AN ORGANIZED HEALTH CARE EDUCATION/TRAINING PROGRAM

## 2019-06-09 PROCEDURE — 99233 SBSQ HOSP IP/OBS HIGH 50: CPT | Mod: GC | Performed by: INTERNAL MEDICINE

## 2019-06-09 PROCEDURE — 40000802 ZZH SITE CHECK

## 2019-06-09 PROCEDURE — 25000128 H RX IP 250 OP 636: Performed by: STUDENT IN AN ORGANIZED HEALTH CARE EDUCATION/TRAINING PROGRAM

## 2019-06-09 PROCEDURE — A9270 NON-COVERED ITEM OR SERVICE: HCPCS | Mod: GY | Performed by: STUDENT IN AN ORGANIZED HEALTH CARE EDUCATION/TRAINING PROGRAM

## 2019-06-09 PROCEDURE — 25000132 ZZH RX MED GY IP 250 OP 250 PS 637: Mod: GY | Performed by: STUDENT IN AN ORGANIZED HEALTH CARE EDUCATION/TRAINING PROGRAM

## 2019-06-09 PROCEDURE — 12000001 ZZH R&B MED SURG/OB UMMC

## 2019-06-09 PROCEDURE — 40000141 ZZH STATISTIC PERIPHERAL IV START W/O US GUIDANCE

## 2019-06-09 RX ADMIN — SODIUM CHLORIDE 4 MILLION UNITS: 9 INJECTION, SOLUTION INTRAVENOUS at 04:31

## 2019-06-09 RX ADMIN — SODIUM CHLORIDE 4 MILLION UNITS: 9 INJECTION, SOLUTION INTRAVENOUS at 16:44

## 2019-06-09 RX ADMIN — METOPROLOL SUCCINATE 25 MG: 25 TABLET, EXTENDED RELEASE ORAL at 07:48

## 2019-06-09 RX ADMIN — SODIUM CHLORIDE 4 MILLION UNITS: 9 INJECTION, SOLUTION INTRAVENOUS at 07:48

## 2019-06-09 RX ADMIN — INDOMETHACIN 25 MG: 25 CAPSULE ORAL at 19:59

## 2019-06-09 RX ADMIN — OXYCODONE HYDROCHLORIDE AND ACETAMINOPHEN 500 MG: 500 TABLET ORAL at 07:48

## 2019-06-09 RX ADMIN — SODIUM CHLORIDE 4 MILLION UNITS: 9 INJECTION, SOLUTION INTRAVENOUS at 00:32

## 2019-06-09 RX ADMIN — DUREZOL 1 DROP: 0.5 EMULSION OPHTHALMIC at 15:34

## 2019-06-09 RX ADMIN — ACETAMINOPHEN 500 MG: 500 TABLET, FILM COATED ORAL at 00:37

## 2019-06-09 RX ADMIN — INDOMETHACIN 25 MG: 25 CAPSULE ORAL at 14:21

## 2019-06-09 RX ADMIN — OMEPRAZOLE 20 MG: 20 CAPSULE, DELAYED RELEASE ORAL at 07:48

## 2019-06-09 RX ADMIN — DUREZOL 1 DROP: 0.5 EMULSION OPHTHALMIC at 12:17

## 2019-06-09 RX ADMIN — DUREZOL 1 DROP: 0.5 EMULSION OPHTHALMIC at 07:48

## 2019-06-09 RX ADMIN — DUREZOL 1 DROP: 0.5 EMULSION OPHTHALMIC at 19:57

## 2019-06-09 RX ADMIN — ASPIRIN 81 MG: 81 TABLET, COATED ORAL at 07:48

## 2019-06-09 RX ADMIN — SODIUM CHLORIDE 4 MILLION UNITS: 9 INJECTION, SOLUTION INTRAVENOUS at 20:00

## 2019-06-09 RX ADMIN — SODIUM CHLORIDE 4 MILLION UNITS: 9 INJECTION, SOLUTION INTRAVENOUS at 12:47

## 2019-06-09 RX ADMIN — CYCLOPENTOLATE HYDROCHLORIDE 1 DROP: 10 SOLUTION OPHTHALMIC at 07:46

## 2019-06-09 RX ADMIN — CYCLOPENTOLATE HYDROCHLORIDE 1 DROP: 10 SOLUTION OPHTHALMIC at 19:58

## 2019-06-09 RX ADMIN — INDOMETHACIN 25 MG: 25 CAPSULE ORAL at 07:48

## 2019-06-09 ASSESSMENT — ACTIVITIES OF DAILY LIVING (ADL)
ADLS_ACUITY_SCORE: 12

## 2019-06-09 ASSESSMENT — PAIN DESCRIPTION - DESCRIPTORS: DESCRIPTORS: ACHING

## 2019-06-09 NOTE — PLAN OF CARE
A&O. Vitals stable.Up ad fernando. Headache pain managed with prn tylenol x1 with relief. Left eye remains sore and cannot be opened all the way. Able to rest comfortably between cares. PIV S/L in between IV abx. Continue with plan of care.

## 2019-06-09 NOTE — PROGRESS NOTES
[facility], [place]    Progress Note - Chad[facility] 1 Service        Date of Admission:  6/7/2019    Assessment & Plan   Mary Carlos is a 74F presenting from OP ophthalmology referral due to positive RPR syphilis antibody screen in the setting of L eye uveitis/scleritis/episcleritis c/f occular syphilis.      Changes today 6/9/19:  -discuss plan with ophthalmology     Positive treponema antibody w/ Neg RPR reflex c/f occular syphilis   Follows w/ rheumatology ([facility]) and ophthalmology ([facility]). Pt with multiple rheumatological markers (+Anti-nuclear antibody 2010, +SSA and SSB, +HLAB27, +RF). + Treponema antibodies with negative reflex RPR. Confirmatory process pending. Denies h/o syphilis tx, <5 lifetime partners, no h/o STD/STIs, no h/o genital ulcers or lesions. Does not recall last HIV test. For purpose of tx, syphilitic eye disease considered a subset of neurosyphilis for management.   - Cont Abx: penicillin G IV 4 million units q4hrs; will discuss today whether this is indicated, or whether we can await neurosyphilis rule out.  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 (hence would need once a week penicilin x 3 weeks and not several weeks of IV penicillin)  - ID, ophtho consults appreciated: per unofficial ID recs: at this time continue abx while we await confirmatory testing  - HIV testing pending     Diet: Regular Diet Adult No Milk to Drink (No Milk on tray)    Fluids: PO  Lines: PIV  DVT Prophylaxis: Low Risk/Ambulatory with no VTE prophylaxis indicated  Cronin Catheter: not present  Code Status: DNR/DNI      Disposition Plan   Expected discharge: Tomorrow, recommended to prior living arrangement once adequate pain management/ tolerating PO medications.  Entered: Emil Charles MD 06/09/2019, 8:31 AM     The patient's care was discussed with the  Attending Physician, Dr. Charles.    MD Chadwick Walsh 1 Service  [facility], [place]  Please see sticky note for cross cover information  ______________________________________________________________________    Interval History   No events overnight. Nursing notes reviewed. Pt w/ improved eye pain, no changes to vision. Denies other joint pains, changes in skin, bowel or bladder dysfunctions    Data reviewed today: I reviewed all medications, new labs and imaging results over the last 24 hours. I personally reviewed no images or EKG's today.    Physical Exam   Vital Signs: Temp: 98.4  F (36.9  C) Temp src: Oral BP: 126/49 Pulse: 63   Resp: 16 SpO2: 98 % O2 Device: None (Room air)    Weight: 0 lbs 0 oz  General Appearance: Alert, pleasant, lying in bed  HEENT: L eye w/ ptosis, erythema (scleritis) improved bilaterally. EOMI  Respiratory: CTAB   Cardiovascular: RRR no appreciable murmurs   GI: Abd soft, NT ND, normoactive BS  Skin: No tender, warm, erythematous joints. No lesions or rashes seen.  Other: Affect normal    Data   Recent Labs   Lab 06/08/19  0626   WBC 4.5   HGB 11.6*            POTASSIUM 4.3   CHLORIDE 112*   CO2 25   BUN 13   CR 0.90   ANIONGAP 4   MAGGY 8.1*   GLC 93     Internal Medicine Staff Addendum  Date of Service: 6/9/2019  I have seen and examined Ms. Carlos, reviewed the data and discussed the plan of care with the patient, her daughrrer and the care team on P&FC Rounds.  I agree with the above documentation     I discussed pt's care with bedside RN, case management/social work today.  I personally reviewed labs, medications and past 24 hr notes.  Assessment/Plan/Diagnoses: plan/dx as above, which contains my edits and reflects our joint medical decision-making.     Emil Charles MD  Internal Medicine/Pediatrics Hospitalist & Staff Physician   of Internal Medicine and Pediatrics  [facility]  Pager: [phone]

## 2019-06-09 NOTE — PLAN OF CARE
/49 (BP Location: Right arm)   Pulse 63   Temp 98.4  F (36.9  C) (Oral)   Resp 16   SpO2 98%     Time 7587-7155      Reason for admission: Neuro syphilis  Vitals: VSS on RA   Activity: Up Ind   Pain: Denies pain  Neuro: A&Ox4, speech logical  Mood/Behavior: calm, cooperative  Cardiac: RRR, no murmurs, no edema BLE  Respiratory: LS clear  GI/: No BM this shift, voiding without difficulty  Diet:Regular   Lines: PIV  Skin: CDI      New changes this shift: Awaiting lab results to determine appropriate abx for infection.      Plan: Continue to monitor and follow POC.

## 2019-06-09 NOTE — PLAN OF CARE
VSS.   Denies pain and nausea.  Tolerating regular-lactose free diet.  Up independently in room.  Voiding but not saving.  Patient stated she had BM today.  Left eye gtt given as ordered- left eye continues to be red and irritated.  Patient A&O x4.  Continue IV antibiotics and current plan of care and notify MD of any changes or concerns.

## 2019-06-10 ENCOUNTER — APPOINTMENT (OUTPATIENT)
Dept: GENERAL RADIOLOGY | Facility: CLINIC | Age: 74
DRG: 057 | End: 2019-06-10
Attending: INTERNAL MEDICINE
Payer: MEDICARE

## 2019-06-10 ENCOUNTER — APPOINTMENT (OUTPATIENT)
Dept: OCCUPATIONAL THERAPY | Facility: CLINIC | Age: 74
DRG: 057 | End: 2019-06-10
Attending: INTERNAL MEDICINE
Payer: MEDICARE

## 2019-06-10 DIAGNOSIS — H44.112 PANUVEITIS OF LEFT EYE: Primary | ICD-10-CM

## 2019-06-10 LAB
GAMMA INTERFERON BACKGROUND BLD IA-ACNC: 0.04 IU/ML
HIV 1+2 AB+HIV1 P24 AG SERPL QL IA: NONREACTIVE
M TB IFN-G BLD-IMP: POSITIVE
M TB IFN-G CD4+ BCKGRND COR BLD-ACNC: 8.06 IU/ML
MITOGEN IGNF BCKGRD COR BLD-ACNC: 0.51 IU/ML
MITOGEN IGNF BCKGRD COR BLD-ACNC: 0.59 IU/ML
RPR SER QL: NONREACTIVE
T PALLIDUM AB SER QL AGGL: REACTIVE

## 2019-06-10 PROCEDURE — 97165 OT EVAL LOW COMPLEX 30 MIN: CPT | Mod: GO

## 2019-06-10 PROCEDURE — 12000001 ZZH R&B MED SURG/OB UMMC

## 2019-06-10 PROCEDURE — 25000128 H RX IP 250 OP 636: Performed by: STUDENT IN AN ORGANIZED HEALTH CARE EDUCATION/TRAINING PROGRAM

## 2019-06-10 PROCEDURE — 99233 SBSQ HOSP IP/OBS HIGH 50: CPT | Mod: GC | Performed by: INTERNAL MEDICINE

## 2019-06-10 PROCEDURE — A9270 NON-COVERED ITEM OR SERVICE: HCPCS | Mod: GY | Performed by: STUDENT IN AN ORGANIZED HEALTH CARE EDUCATION/TRAINING PROGRAM

## 2019-06-10 PROCEDURE — 25800030 ZZH RX IP 258 OP 636: Performed by: STUDENT IN AN ORGANIZED HEALTH CARE EDUCATION/TRAINING PROGRAM

## 2019-06-10 PROCEDURE — 25000125 ZZHC RX 250

## 2019-06-10 PROCEDURE — 40000141 ZZH STATISTIC PERIPHERAL IV START W/O US GUIDANCE

## 2019-06-10 PROCEDURE — 27211389 ZZ H KIT, 5 FR DL BIOFLO OPEN ENDED PICC

## 2019-06-10 PROCEDURE — 97530 THERAPEUTIC ACTIVITIES: CPT | Mod: GO

## 2019-06-10 PROCEDURE — 25000132 ZZH RX MED GY IP 250 OP 250 PS 637: Mod: GY | Performed by: STUDENT IN AN ORGANIZED HEALTH CARE EDUCATION/TRAINING PROGRAM

## 2019-06-10 PROCEDURE — 27211414 ZZ H ADHESIVE SKIN CLOSURE, DERMABOND

## 2019-06-10 PROCEDURE — 97535 SELF CARE MNGMENT TRAINING: CPT | Mod: GO

## 2019-06-10 PROCEDURE — 40000986 XR CHEST PORT 1 VW

## 2019-06-10 PROCEDURE — 25000131 ZZH RX MED GY IP 250 OP 636 PS 637: Mod: GY

## 2019-06-10 PROCEDURE — 36569 INSJ PICC 5 YR+ W/O IMAGING: CPT

## 2019-06-10 RX ORDER — EPINEPHRINE 0.3 MG/.3ML
0.3 INJECTION SUBCUTANEOUS PRN
Qty: 1 EACH | Status: ON HOLD | DISCHARGE
Start: 2019-06-10 | End: 2019-07-01

## 2019-06-10 RX ORDER — INDOMETHACIN 25 MG/1
25 CAPSULE ORAL 3 TIMES DAILY
Qty: 90 CAPSULE | Refills: 1 | Status: ON HOLD | DISCHARGE
Start: 2019-06-10 | End: 2019-07-01

## 2019-06-10 RX ORDER — PREDNISONE 20 MG/1
60 TABLET ORAL DAILY
Qty: 7 TABLET | Refills: 0 | Status: ON HOLD | DISCHARGE
Start: 2019-06-11 | End: 2019-07-01

## 2019-06-10 RX ORDER — PYRIDOXINE HCL (VITAMIN B6) 25 MG
25 TABLET ORAL DAILY
Status: DISCONTINUED | OUTPATIENT
Start: 2019-06-11 | End: 2019-06-11 | Stop reason: HOSPADM

## 2019-06-10 RX ORDER — PREDNISONE 20 MG/1
60 TABLET ORAL DAILY
Status: DISCONTINUED | OUTPATIENT
Start: 2019-06-10 | End: 2019-06-11 | Stop reason: HOSPADM

## 2019-06-10 RX ORDER — CYCLOPENTOLATE HYDROCHLORIDE 10 MG/ML
1 SOLUTION/ DROPS OPHTHALMIC 2 TIMES DAILY
Qty: 2 ML | Refills: 0 | Status: ON HOLD | DISCHARGE
Start: 2019-06-10 | End: 2019-07-01

## 2019-06-10 RX ORDER — METOPROLOL SUCCINATE 25 MG/1
25 TABLET, EXTENDED RELEASE ORAL DAILY
Qty: 90 TABLET | Refills: 1 | DISCHARGE
Start: 2019-06-10

## 2019-06-10 RX ORDER — HEPARIN SODIUM,PORCINE 10 UNIT/ML
2-5 VIAL (ML) INTRAVENOUS
Status: DISCONTINUED | OUTPATIENT
Start: 2019-06-10 | End: 2019-06-11 | Stop reason: HOSPADM

## 2019-06-10 RX ORDER — ISONIAZID 300 MG/1
300 TABLET ORAL DAILY
Status: DISCONTINUED | OUTPATIENT
Start: 2019-06-11 | End: 2019-06-11 | Stop reason: HOSPADM

## 2019-06-10 RX ORDER — LIDOCAINE 40 MG/G
CREAM TOPICAL
Status: DISCONTINUED | OUTPATIENT
Start: 2019-06-10 | End: 2019-06-11 | Stop reason: HOSPADM

## 2019-06-10 RX ORDER — ASCORBIC ACID 500 MG
500 TABLET ORAL DAILY
Qty: 30 TABLET | Refills: 0 | Status: ON HOLD | DISCHARGE
Start: 2019-06-10 | End: 2019-07-01

## 2019-06-10 RX ORDER — OMEPRAZOLE 20 MG/1
20 TABLET, DELAYED RELEASE ORAL DAILY
Qty: 30 TABLET | Refills: 0 | Status: ON HOLD
Start: 2019-06-10 | End: 2019-07-01

## 2019-06-10 RX ORDER — DIPHENHYDRAMINE HCL 25 MG
25 CAPSULE ORAL PRN
Qty: 30 CAPSULE | Refills: 0 | Status: ON HOLD | DISCHARGE
Start: 2019-06-10 | End: 2019-07-01

## 2019-06-10 RX ORDER — DIFLUPREDNATE OPHTHALMIC 0.5 MG/ML
1 EMULSION OPHTHALMIC 4 TIMES DAILY
Qty: 5 ML | Refills: 1 | DISCHARGE
Start: 2019-06-10 | End: 2019-06-26

## 2019-06-10 RX ORDER — ACETAMINOPHEN 500 MG
500 TABLET ORAL PRN
Qty: 30 TABLET | Refills: 0 | DISCHARGE
Start: 2019-06-10 | End: 2019-06-10

## 2019-06-10 RX ORDER — ASPIRIN 81 MG/1
81 TABLET ORAL DAILY
Qty: 30 TABLET | Refills: 0 | Status: ON HOLD | DISCHARGE
Start: 2019-06-10 | End: 2019-07-01

## 2019-06-10 RX ORDER — ACETAMINOPHEN 500 MG
500 TABLET ORAL PRN
Qty: 30 TABLET | Refills: 0 | DISCHARGE
Start: 2019-06-10 | End: 2019-06-26

## 2019-06-10 RX ADMIN — DUREZOL 1 DROP: 0.5 EMULSION OPHTHALMIC at 12:30

## 2019-06-10 RX ADMIN — SODIUM CHLORIDE 4 MILLION UNITS: 9 INJECTION, SOLUTION INTRAVENOUS at 08:41

## 2019-06-10 RX ADMIN — PREDNISONE 60 MG: 20 TABLET ORAL at 10:45

## 2019-06-10 RX ADMIN — OMEPRAZOLE 20 MG: 20 CAPSULE, DELAYED RELEASE ORAL at 08:03

## 2019-06-10 RX ADMIN — SODIUM CHLORIDE 4 MILLION UNITS: 9 INJECTION, SOLUTION INTRAVENOUS at 16:52

## 2019-06-10 RX ADMIN — ACETAMINOPHEN 500 MG: 500 TABLET, FILM COATED ORAL at 00:15

## 2019-06-10 RX ADMIN — OXYCODONE HYDROCHLORIDE AND ACETAMINOPHEN 500 MG: 500 TABLET ORAL at 08:03

## 2019-06-10 RX ADMIN — SODIUM CHLORIDE 4 MILLION UNITS: 9 INJECTION, SOLUTION INTRAVENOUS at 12:29

## 2019-06-10 RX ADMIN — DUREZOL 1 DROP: 0.5 EMULSION OPHTHALMIC at 16:52

## 2019-06-10 RX ADMIN — SODIUM CHLORIDE 4 MILLION UNITS: 9 INJECTION, SOLUTION INTRAVENOUS at 20:52

## 2019-06-10 RX ADMIN — DUREZOL 1 DROP: 0.5 EMULSION OPHTHALMIC at 20:52

## 2019-06-10 RX ADMIN — ASPIRIN 81 MG: 81 TABLET, COATED ORAL at 08:04

## 2019-06-10 RX ADMIN — LIDOCAINE HYDROCHLORIDE 1 ML: 10 INJECTION, SOLUTION EPIDURAL; INFILTRATION; INTRACAUDAL; PERINEURAL at 21:05

## 2019-06-10 RX ADMIN — SODIUM CHLORIDE 4 MILLION UNITS: 9 INJECTION, SOLUTION INTRAVENOUS at 00:11

## 2019-06-10 RX ADMIN — INDOMETHACIN 25 MG: 25 CAPSULE ORAL at 08:04

## 2019-06-10 RX ADMIN — DUREZOL 1 DROP: 0.5 EMULSION OPHTHALMIC at 08:04

## 2019-06-10 RX ADMIN — INDOMETHACIN 25 MG: 25 CAPSULE ORAL at 13:54

## 2019-06-10 RX ADMIN — METOPROLOL SUCCINATE 25 MG: 25 TABLET, EXTENDED RELEASE ORAL at 08:03

## 2019-06-10 RX ADMIN — INDOMETHACIN 25 MG: 25 CAPSULE ORAL at 20:52

## 2019-06-10 RX ADMIN — CYCLOPENTOLATE HYDROCHLORIDE 1 DROP: 10 SOLUTION OPHTHALMIC at 08:04

## 2019-06-10 RX ADMIN — SODIUM CHLORIDE 4 MILLION UNITS: 9 INJECTION, SOLUTION INTRAVENOUS at 04:30

## 2019-06-10 RX ADMIN — CYCLOPENTOLATE HYDROCHLORIDE 1 DROP: 10 SOLUTION OPHTHALMIC at 20:52

## 2019-06-10 ASSESSMENT — ACTIVITIES OF DAILY LIVING (ADL)
ADLS_ACUITY_SCORE: 12
PREVIOUS_RESPONSIBILITIES: LAUNDRY
ADLS_ACUITY_SCORE: 12

## 2019-06-10 NOTE — PLAN OF CARE
A&Ox4.  Slept through the night.  VSS on RA.  UP ad fernando in room. Call appropriately.  L eye continues to have blurred vision and some drainage.  Penicillin via R IV q4h.  Continue to monitor and follow POC.

## 2019-06-10 NOTE — PROGRESS NOTES
[facility]    Progress Note - Chad[facility] 1 Service        Date of Admission:  6/7/2019    Assessment & Plan   Mary Carlos is a 74F presenting from OP ophthalmology referral due to positive RPR syphilis antibody screen in the setting of L eye uveitis/scleritis/episcleritis c/f occular syphilis.      Changes today 6/10/19:  -Opth + ID to see pt today  -Started 60mg Prednisone daily     Positive treponema antibody w/ Neg RPR reflex c/f occular syphilis   Follows w/ rheumatology ([facility]) and ophthalmology ([facility]). Pt with multiple rheumatological markers (+Anti-nuclear antibody 2010, +SSA and SSB, +HLAB27, +RF). + Treponema antibodies with negative reflex RPR. Confirmatory + TP-PA. Denies h/o syphilis tx, <5 lifetime partners, no h/o STD/STIs, no h/o genital ulcers or lesions. Does not recall last HIV test. For purpose of tx, syphilitic eye disease considered a subset of neurosyphilis for management.   - Cont Abx: penicillin G IV continuous  - ID, ophtho consults appreciated: 2wk Pen G + prednisone 60mg every day until seen by ophthalmology 6/13     Positive Quant Gold  Positive quant gold noted on initial lab workup. CXR w/o abnormalities. No recent SOB, fevers, changes in weight.   -Will have to refer to [facility] as outpatient for latent TB      Diet: Regular Diet Adult No Milk to Drink (No Milk on tray)    Fluids: PO  Lines: PIV  DVT Prophylaxis: Low Risk/Ambulatory with no VTE prophylaxis indicated  Cronin Catheter: not present  Code Status: DNR/DNI      Disposition Plan   Expected discharge: Tomorrow, or Today recommended to transitional care unit once adequate pain management/ tolerating PO medications and safe disposition plan/ TCU bed available.  Entered: Madie Rousseau MD 06/10/2019, 1:16 PM     The patient's care was discussed with the Attending Physician, Dr. Charles.    Madie Rousseau MD, MPH  [facility] 1 Service  [facility]  Please  see sticky note for cross cover information  ______________________________________________________________________    Interval History   No events overnight. Nursing notes reviewed. Pt w/ improved eye pain, no changes to vision. Denies other joint pains, changes in skin, bowel or bladder dysfunctions    Data reviewed today: I reviewed all medications, new labs and imaging results over the last 24 hours. I personally reviewed no images or EKG's today.    Physical Exam   Vital Signs: Temp: 97.8  F (36.6  C) Temp src: Oral BP: 143/53 Pulse: 63   Resp: 16 SpO2: 97 % O2 Device: None (Room air)    Weight: 0 lbs 0 oz  General Appearance: Alert, pleasant, lying in bed  HEENT: L eye w/ ptosis, erythema (scleritis) improved bilaterally. EOMI  Respiratory: CTAB   Cardiovascular: RRR no appreciable murmurs   GI: Abd soft, NT ND, normoactive BS  Skin: No tender, warm, erythematous joints. No lesions or rashes seen.  Other: Affect normal    Data   Recent Labs   Lab 06/08/19  0626   WBC 4.5   HGB 11.6*            POTASSIUM 4.3   CHLORIDE 112*   CO2 25   BUN 13   CR 0.90   ANIONGAP 4   MAGGY 8.1*   GLC 93     Internal Medicine Staff Addendum  Date of Service: 6/10/2019  I have seen and examined Ms. Carlos, reviewed the data and discussed the plan of care with the patient, her daughter, and the care team on P&FC Rounds.  I agree with the above documentation     I discussed pt's care with bedside RN, case management/social work today.  I personally reviewed labs, medications and past 24 hr notes.  Assessment/Plan/Diagnoses: plan/dx as above, which contains my edits and reflects our joint medical decision-making.     Emil Charles MD  Internal Medicine/Pediatrics Hospitalist & Staff Physician   of Internal Medicine and Pediatrics  [facility]  Pager: [phone]

## 2019-06-10 NOTE — PROGRESS NOTES
Social Work Services Progress Note    Hospital Day: 3  Date of Initial Social Work Evaluation:  Not yet completed  Collaborated with:  Pt, pt's daughter, CC    Data:  Pt is a 74 year old female admitted to [facility] on 6/7/19. Pt is needing IV antibiotics and requiring TCU placement.     Intervention:  SW met with the pt and dtr at bedside to discuss discharge planning and TCU placement.     The following referrals have been made:    1) [facility] (ph: [phone])  2) [facility] (ph: [phone])  3) [facility] (ph: [phone])    Assessment:  Pt and family understanding of d/c plans.    Plan:    Anticipated Disposition:  Facility:  TCU    Barriers to d/c plan:  Bed avialability    Follow Up:  SW will remain available and continue to follow for discharge planning.    SCOTTIE Villa, LEONARDOW  5B   Pager [phone]  Phone [phone]    Addendum 2:00p:    1) VM left for [facility] Admissions  2) [facility] - Cannot accept due to lack of beds  3) [facility] - Cannot accept due to cost of IV.     More referrals were sent to the following upon family request:    1) [facility] (ph: [phone])  2) [facility] (ph: [phone])  3) [facility]  (ph: [phone])  4) [facility] (ph: [phone])  5) [facility] (ph: [phone])      SW will remain available and continue to assist in discharge planning.     Addendum 3:30pm: Pt has been accepted to placement at the [facility] (ph: [phone]). Pt's dtr will transport and is planning to  at 9am. Team notified.     SCOTTIE Villa, LEONARDOW  5B   Pager [phone]  Phone [phone]

## 2019-06-10 NOTE — PLAN OF CARE
VSS on room air, up ind in room, left eye swollen and sclera red, eye drops per order, remains on iv antibiotics via PIV, plan for PICC line and discharge tomorrow to TCU, denies SOB, pain, N/V

## 2019-06-10 NOTE — PROGRESS NOTES
[facility] Service: Follow Up Note      Patient:  Mary Carlos, Date of birth 1945, Medical record number [id number]  Date of Visit:  Soledad 10, 2019         Assessment and Recommendations:     Problem List:  1. Positive treponema antibody test (confirmed), with negative RPR  2. Uveitis, scleritis of L eye  3. 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 Continue penicillin, empiric therapy for neurosyphilis   - Plan to treat for a total antibiotic course of 14 days   - May convert to continuous infusion (1m U/hr) for easier home dosing, if possible  2. Ongoing management of eye complications per ophtho team     Discussion: 75 yo F that presented from ophthalmology clinic referral due to indeterminite syphilis screen in the setting of L eye uveitis/scleritis/episcleritis and bilateral keratitis. Her syphilis testing came back with discordant results, positive treponemal and negative RPR. Confirmatory testing for treponemal test also now back as being positive. Her ophthalmologists would like to start her on immunosuppresive therapy that could be harmful if this were a manifestation of ocular syphilis, which they believe it could be consistent with. My understanding is that this is time-critical therapy given evolution of her eye disease, and that they would feel safe to proceed after syphilis has been ruled out or after she has been initiated on therapy. For these reasons, the decision was made to admit her for empiric therapy.    This would be a very interesting presentation of ocular syphilis, but it certainly possible. RPR may be nonreactive in late neurosyphilis, including ocular syphilis. With the suspicion for ocular syphilis on exam (per ophtho team), and a positive treponemal test we have to assume this is the case. These tests remain reactive for life in virtually all individuals regardless of previous treatment. Reactivity of these serum tests confirms that the patient has had syphilis  at some time, and that she remained at risk for ocular syphilis. She should complete 2-weeks of Pen G for ocular syphilis.     We will sign off the case now anticipating discharge home soon. Call anytime with questions or concerns.     Humphrey Pearson MD  [facility]  [phone]        Interval History:     No change from previous.  She remains well in the hospital    Confirmatory treponemal test: positive    Antibiotics: Pen G D3         Review of Systems:   CONSTITUTIONAL:  No fevers or chills  EYES: negative for icterus  ENT:  negative for oral lesions, hearing loss, tinnitus and sore throat  RESPIRATORY:  negative for cough and dyspnea  CARDIOVASCULAR:  negative for chest pain, palpitations  GASTROINTESTINAL:  negative for nausea, vomiting, diarrhea and constipation  GENITOURINARY:  negative for dysuria  HEME:  No easy bruising/bleeding  INTEGUMENT:  negative for rash and pruritus  NEURO:  Negative for headache         Physical Exam:   Ranges for vital signs:  Temp:  [96.5  F (35.8  C)-97.8  F (36.6  C)] 97.8  F (36.6  C)  Pulse:  [63-67] 63  Resp:  [16] 16  BP: (130-153)/(47-56) 143/53  SpO2:  [97 %-98 %] 97 %    Intake/Output Summary (Last 24 hours) at 6/10/2019 1213  Last data filed at 6/9/2019 1900  Gross per 24 hour   Intake 360 ml   Output --   Net 360 ml     Exam:  GENERAL:  well-developed, well-nourished, sitting in bed in no acute distress.   ENT:  Head is normocephalic, atraumatic. Oropharynx is moist without exudates or ulcers.  EYES:  L eye w/ ptosis, erythema (scleritis) improved bilaterally. EOMI   NECK:  Supple.  LUNGS:  Clear to auscultation.  CARDIOVASCULAR:  Regular rate and rhythm with no murmurs, gallops or rubs.  ABDOMEN:  Normal bowel sounds, soft, nontender.  EXT: Extremities warm and without edema.  SKIN:  No acute rashes.  Line is in place without any surrounding erythema.  NEUROLOGIC:  Grossly nonfocal.         Laboratory Data:     Creatinine   Date Value Ref Range Status   06/08/2019  0.90 0.52 - 1.04 mg/dL Final   01/02/2015 0.85 0.52 - 1.04 mg/dL Final     WBC   Date Value Ref Range Status   06/08/2019 4.5 4.0 - 11.0 10e9/L Final   01/02/2015 5.4 4.0 - 11.0 10e9/L Final     Hemoglobin   Date Value Ref Range Status   06/08/2019 11.6 (L) 11.7 - 15.7 g/dL Final     Platelet Count   Date Value Ref Range Status   06/08/2019 215 150 - 450 10e9/L Final     AST   Date Value Ref Range Status   01/02/2015 16 0 - 45 U/L Final     ALT   Date Value Ref Range Status   01/02/2015 17 0 - 50 U/L Final     Bilirubin Total   Date Value Ref Range Status   01/02/2015 1.1 0.2 - 1.3 mg/dL Final     Lab Results   Component Value Date     06/08/2019    BUN 13 06/08/2019    CO2 25 06/08/2019       Imaging:    CXR 6/6/19: IMPRESSION: No acute abnormality. Lungs are well-inflated and clear.  Heart size is normal.

## 2019-06-10 NOTE — PROGRESS NOTES
06/10/19 0944   Quick Adds   Type of Visit Initial Occupational Therapy Evaluation   Living Environment   Lives With child(lashonda), adult   Living Arrangements house   Home Accessibility stairs within home   Number of Stairs, Within Home, Primary 8   Transportation Anticipated family or friend will provide   Living Environment Comment Pt currently lives in a house with [place] and son in law. Tub shower in home. Pt daughter is home 24/7 to A pt.    Self-Care   Usual Activity Tolerance good   Current Activity Tolerance good   Equipment Currently Used at Home cane, straight;other (see comments)  (Non-skid tub adhesive in tub shower)   Activity/Exercise/Self-Care Comment Amb at baseline with cane. Currently only limited by decreased vision in L eye   Functional Level   Ambulation 1-->assistive equipment   Transferring 0-->independent   Toileting 0-->independent   Bathing 0-->independent   Dressing 0-->independent   Eating 0-->independent   Communication 0-->understands/communicates without difficulty   Swallowing 0-->swallows foods/liquids without difficulty   Cognition 0 - no cognition issues reported   Fall history within last six months no   Which of the above functional risks had a recent onset or change? none   Prior Functional Level Comment Pt previously IND with ADLs and recieved A from daughter with IADLs   General Information   Onset of Illness/Injury or Date of Surgery - Date 06/07/19   Referring Physician Landy Young MD   Patient/Family Goals Statement To d/c home   Additional Occupational Profile Info/Pertinent History of Current Problem 74F presenting from OP ophthalmology referral due to positive RPR syphilis antibody screen in the setting of L eye uveitis/scleritis/episcleritis and bilateral keratitis and age-related cataracts,  hypertension, breast cancer, osteoarthritis   Precautions/Limitations no known precautions/limitations   General Info Comments Up ad fernando   Cognitive Status Examination    Orientation orientation to person, place and time   Level of Consciousness alert   Follows Commands (Cognition) WNL   Memory intact   Attention No deficits were identified   Organization/Problem Solving No deficits were identified   Visual Perception   Visual Perception Wears glasses  (reading glassess)   Occulomotor R eye WNL   Visual Perception Comments R eye WNL, unable to assess L eye thoroughly due to pt inability to open the eye. 100% accurate on letter cancelation.   Sensory Examination   Sensory Quick Adds No deficits were identified   Pain Assessment   Patient Currently in Pain No   Integumentary/Edema   Integumentary/Edema no deficits were identifed   Posture   Posture not impaired   Range of Motion (ROM)   ROM Comment RUE ROM WNL, LUE WFL with pt unable go past ~80 degrees shldr flex with AROM. Pt reporting L side has had reduced ROM after breast cancer surgery ~11 years ago.    Strength   Strength Comments R side WNL, L side WFL   Transfer Skill: Bed to Chair/Chair to Bed   Level of [place]: Bed to Chair independent   Transfer Skill: Sit to Stand   Level of [place]: Sit/Stand independent   Transfer Skill: Toilet Transfer   Level of [place]: Toilet stand-by assist   Lower Body Dressing   Level of [place]: Dress Lower Body independent   Toileting   Level of [place]: Toilet independent   Grooming   Level of [place]: Grooming independent   Eating/Self Feeding   Level of [place]: Eating stand-by assist   Physical Assist/Nonphysical Assist: Eating set-up required   Instrumental Activities of Daily Living (IADL)   Previous Responsibilities laundry   IADL Comments Pt does her own laundry at home and then receives A with all other IADLs   Activities of Daily Living Analysis   Impairments Contributing to Impaired Activities of Daily Living   (decreased vision in L eye)   General Therapy Interventions   Planned Therapy Interventions ADL retraining;IADL retraining;visual  "perception   Clinical Impression   Criteria for Skilled Therapeutic Interventions Met yes, treatment indicated   OT Diagnosis Decreased ability to complete self-cares and fucntional mobility safely 2/2 to new limited vision   Influenced by the following impairments decreased vision in L eye   Assessment of Occupational Performance 1-3 Performance Deficits   Identified Performance Deficits laundry, functional mobility/transfers   Clinical Decision Making (Complexity) Low complexity   Therapy Frequency daily   Predicted Duration of Therapy Intervention (days/wks) 2 days   Anticipated Discharge Disposition Home with Assist;Home with Home Therapy   Risks and Benefits of Treatment have been explained. Yes   Patient, Family & other staff in agreement with plan of care Yes   Clinical Impression Comments Pt has decreased abiltity to complete ADL/IADLs and would benefit from continued therapy to increase IND and ensure safety upon d/c home.    [facility] AM-PAC  \"6 Clicks\" Daily Activity Inpatient Short Form   1. Putting on and taking off regular lower body clothing? 4 - None   2. Bathing (including washing, rinsing, drying)? 4 - None   3. Toileting, which includes using toilet, bedpan or urinal? 4 - None   4. Putting on and taking off regular upper body clothing? 4 - None   5. Taking care of personal grooming such as brushing teeth? 4 - None   6. Eating meals? 4 - None   Daily Activity Raw Score (Score out of 24.Lower scores equate to lower levels of function) 24   Total Evaluation Time   Total Evaluation Time (Minutes) 5     "

## 2019-06-10 NOTE — PLAN OF CARE
Time 4633-8325     Reason for admission: Neuro syphilis    Vitals: VSS on RA    /68 (BP Location: Left arm)   Pulse 57   Temp 98.7  F (37.1  C) (Oral)   Resp 16   SpO2 96%     Activity: Ind. Ambulated in halls  Pain: Denies  Neuro: A&Ox4  Cardiac: WDL  Respiratory: WDL on RA  GI/: Voiding spontaneously. No BM on shift. Denies nausea.  Diet: Regular, no milk  Lines: PIV  New this shift: Worked with therapy. Family at bedside. Pending PICC placement; provider to determine if placed inpatient or OP.    Plan: PICC line placement pending. Need for OP IV abx.       Continue to monitor and follow

## 2019-06-10 NOTE — PLAN OF CARE
Discharge Planner OT   Patient plan for discharge: Home with A  Current status: IND with LB dressing, amb ~275ft with single point cane and CGA/SBA and no LOB noted, SBA for toilet transfer and IND with arron cares, standing g/h with SBA, weakness in L UE with pt reporting difficulty at home with opening items, 100% accurate with letter cancellation activity for visual scanning  Barriers to return to prior living situation: decreased vision in L eye, L UE weakness  Recommendations for discharge: Home A and  OT for home safety assessment and potential OP vision therapy for compensatory strategies if pt vision does not remediate after medical tx  Rationale for recommendations: Pt appears to be at baseline for ADLs with pt primarily limited by recent decreased vision in L eye. Pt daughter and son in law live with her and pt daughter is home 24/7 to A pt with all needs. Pt would benefit from HH OT safety assessment to ensure home is safe for pt with current decreased vision and to reduce fall risks. Potential OP vision therapy will be needed if medical intervention does not remediate pt vision.        Entered by: Becca Levy 06/10/2019 10:12 AM

## 2019-06-10 NOTE — PLAN OF CARE
Pt admitted with left eye uveitis/scleritis/episcleritis c/f occular syphilis. Pt A&Ox4, up ad fernando. Pt denied any pain. Pt received IV antibiotics and eye drops every 4 hours. Pt had blurred vision in her left eye. Pt's left eye was red and cloudy. Pt said she sometimes gets clear drainage. Pt able to make her needs known. Possible discharge home tomorrow. Continue with plan of care.

## 2019-06-10 NOTE — PLAN OF CARE
PT: Defer - Orders received and acknowledged. Per discussion with OT patient is at baseline in regards to functional mobility and is able to safely ambulate around unit with SEC. PT to complete orders at this time.

## 2019-06-11 VITALS
OXYGEN SATURATION: 98 % | RESPIRATION RATE: 16 BRPM | SYSTOLIC BLOOD PRESSURE: 166 MMHG | DIASTOLIC BLOOD PRESSURE: 70 MMHG | WEIGHT: 168.7 LBS | TEMPERATURE: 98.6 F | HEART RATE: 60 BPM | BODY MASS INDEX: 28.07 KG/M2

## 2019-06-11 LAB
ALBUMIN SERPL-MCNC: 2.6 G/DL (ref 3.4–5)
ALP SERPL-CCNC: 90 U/L (ref 40–150)
ALT SERPL W P-5'-P-CCNC: 10 U/L (ref 0–50)
AST SERPL W P-5'-P-CCNC: 12 U/L (ref 0–45)
BILIRUB DIRECT SERPL-MCNC: 0.1 MG/DL (ref 0–0.2)
BILIRUB SERPL-MCNC: 0.8 MG/DL (ref 0.2–1.3)
PROT SERPL-MCNC: 6 G/DL (ref 6.8–8.8)

## 2019-06-11 PROCEDURE — 99238 HOSP IP/OBS DSCHRG MGMT 30/<: CPT | Mod: GC | Performed by: INTERNAL MEDICINE

## 2019-06-11 PROCEDURE — A9270 NON-COVERED ITEM OR SERVICE: HCPCS | Mod: GY

## 2019-06-11 PROCEDURE — A9270 NON-COVERED ITEM OR SERVICE: HCPCS | Mod: GY | Performed by: STUDENT IN AN ORGANIZED HEALTH CARE EDUCATION/TRAINING PROGRAM

## 2019-06-11 PROCEDURE — 25000128 H RX IP 250 OP 636: Performed by: STUDENT IN AN ORGANIZED HEALTH CARE EDUCATION/TRAINING PROGRAM

## 2019-06-11 PROCEDURE — 36415 COLL VENOUS BLD VENIPUNCTURE: CPT

## 2019-06-11 PROCEDURE — 80076 HEPATIC FUNCTION PANEL: CPT

## 2019-06-11 PROCEDURE — 25000132 ZZH RX MED GY IP 250 OP 250 PS 637: Mod: GY

## 2019-06-11 PROCEDURE — 25800030 ZZH RX IP 258 OP 636: Performed by: STUDENT IN AN ORGANIZED HEALTH CARE EDUCATION/TRAINING PROGRAM

## 2019-06-11 PROCEDURE — 25000131 ZZH RX MED GY IP 250 OP 636 PS 637: Mod: GY

## 2019-06-11 PROCEDURE — 25000132 ZZH RX MED GY IP 250 OP 250 PS 637: Mod: GY | Performed by: STUDENT IN AN ORGANIZED HEALTH CARE EDUCATION/TRAINING PROGRAM

## 2019-06-11 RX ORDER — PYRIDOXINE HCL (VITAMIN B6) 25 MG
25 TABLET ORAL DAILY
Qty: 30 TABLET | Refills: 0 | DISCHARGE
Start: 2019-06-12

## 2019-06-11 RX ORDER — ISONIAZID 300 MG/1
300 TABLET ORAL DAILY
Qty: 90 TABLET | Refills: 2 | DISCHARGE
Start: 2019-06-12 | End: 2019-09-26

## 2019-06-11 RX ADMIN — ISONIAZID 300 MG: 300 TABLET ORAL at 07:52

## 2019-06-11 RX ADMIN — OMEPRAZOLE 20 MG: 20 CAPSULE, DELAYED RELEASE ORAL at 07:52

## 2019-06-11 RX ADMIN — METOPROLOL SUCCINATE 25 MG: 25 TABLET, EXTENDED RELEASE ORAL at 07:59

## 2019-06-11 RX ADMIN — DUREZOL 1 DROP: 0.5 EMULSION OPHTHALMIC at 07:51

## 2019-06-11 RX ADMIN — SODIUM CHLORIDE 4 MILLION UNITS: 9 INJECTION, SOLUTION INTRAVENOUS at 04:22

## 2019-06-11 RX ADMIN — OXYCODONE HYDROCHLORIDE AND ACETAMINOPHEN 500 MG: 500 TABLET ORAL at 07:52

## 2019-06-11 RX ADMIN — SODIUM CHLORIDE 4 MILLION UNITS: 9 INJECTION, SOLUTION INTRAVENOUS at 00:00

## 2019-06-11 RX ADMIN — ASPIRIN 81 MG: 81 TABLET, COATED ORAL at 07:52

## 2019-06-11 RX ADMIN — INDOMETHACIN 25 MG: 25 CAPSULE ORAL at 07:52

## 2019-06-11 RX ADMIN — PREDNISONE 60 MG: 20 TABLET ORAL at 07:52

## 2019-06-11 RX ADMIN — SODIUM CHLORIDE 4 MILLION UNITS: 9 INJECTION, SOLUTION INTRAVENOUS at 08:47

## 2019-06-11 RX ADMIN — Medication 25 MG: at 07:52

## 2019-06-11 RX ADMIN — CYCLOPENTOLATE HYDROCHLORIDE 1 DROP: 10 SOLUTION OPHTHALMIC at 07:51

## 2019-06-11 ASSESSMENT — ACTIVITIES OF DAILY LIVING (ADL)
ADLS_ACUITY_SCORE: 12

## 2019-06-11 NOTE — PROGRESS NOTES
Pt adequate for discharge. A&Ox4; VSS. PIV removed. PICC in place per POC. Discharge reviewed with pt and daughter. Discharging to [facility] [place] [facility] to receive IV abx; report given to DEZ Claros. Family to transport. Belongings returned from security; with pt at discharge.

## 2019-06-11 NOTE — PROGRESS NOTES
Social Work Services Discharge Note      Patient Name:  Mary Carlos     Anticipated Discharge Date:  6/11/19    Discharge Disposition:   TCU:  [facility]      FOR NURSING UPDATE: (ph: [phone])    Following MD:  Madie Rousseau MD     Pre-Admission Screening (PAS) online form has been completed.  The Level of Care (LOC) is:  Determined  Confirmation Code is:  [id number]  Patient/caregiver informed of referral to [facility] Line for Pre-Admission Screening for skilled nursing facility (SNF) placement and to expect a phone call post discharge from SNF.     Additional Services/Equipment Arranged: IV     Patient / Family response to discharge plan:  in agreement     Persons notified of above discharge plan:  Pt, pt's daughter, medical team, TCU    Staff Discharge Instructions:  SW will fax discharge orders and signed hard scripts for any controlled substances.    Please print a packet and send with patient.     CTS Handoff completed:  NO    Medicare Notice of Rights provided to the patient/family:  YES    Pt is a 74 year old female admitted to [facility] on 6/7/19. Pt will be discharged today to [facility]. [facility] to transport at 9am.     SCOTTIE Villa, LSW  5B   Pager [phone]  Phone [phone]

## 2019-06-11 NOTE — DISCHARGE SUMMARY
[facility], [place]  Discharge Summary - Medicine & Pediatrics       Date of Admission:  6/7/2019  Date of Discharge:  6/11/19  Discharging Provider: Dr Emani Arora  Discharge Service: [facility] 1    Discharge Diagnoses   Occular syphilis  Latent TB    Follow-ups Needed After Discharge   Follow-up Appointments     Follow Up and recommended labs and tests      Follow up with Dr. Fuller, Humphrey Trujillo MD in eye clinic.  PCP to follow up with latent TB treatment, monitoring liver function as   well          Please schedule an appointment with PCP to discuss ongoing latent TB treatment and complex care coordination.     Unresulted Labs Ordered in the Past 30 Days of this Admission     Date and Time Order Name Status Description    6/6/2019 1400 TREPONEMA PALLIDUM ANTIBODY CONFIRM In process       These results will be followed up by PCP, Opthalmology     Discharge Disposition   Discharged to TCU  Condition at discharge: Stable    Hospital Course   Mary Carlos was admitted on 6/7/2019 from OP ophthalmology referral due to positive RPR syphilis antibody screen in the setting of L eye uveitis/scleritis/episcleritis c/f occular syphilis.     Positive treponema antibody w/ Neg RPR reflex c/f occular syphilis   Follows w/ rheumatology (Scooby) and ophthalmology (Wero). Pt with multiple rheumatological markers (+Anti-nuclear antibody 2010, +SSA and SSB, +HLAB27, +RF). + Treponema antibodies with negative reflex RPR however confirmatory + TP-PA. Denies h/o syphilis tx, <5 lifetime partners, no h/o STD/STIs, no h/o genital ulcers or lesions. Does not recall last HIV test. For purpose of tx, syphilitic eye disease considered a subset of neurosyphilis for management.   - She was started on continuous penicillin G IV. Plan for 2 week course (end date 6/21/19)  - Continue prednisone 60mg every day until seen by ophthalmology 6/13 (per ophthalmology recs)     Positive Quant Gold  Positive quant  gold noted on initial lab workup. CXR w/o abnormalities. No recent SOB, fevers, changes in weight. Reports active TB contact (brother) who was treated many years ago.  -Was started on 300mg INH (given 76.5kg) w/ Pyridoxine 25mg daily (consider titration up to 50mg as needed)  -Will need follow up liver enzymes per PCP in next two weeks     Consultations This Hospital Stay   INFECTIOUS DISEASE GENERAL ADULT IP CONSULT  OPHTHALMOLOGY IP CONSULT  VASCULAR ACCESS CARE ADULT IP CONSULT  VASCULAR ACCESS CARE ADULT IP CONSULT  VASCULAR ACCESS CARE ADULT IP CONSULT  PHYSICAL THERAPY ADULT IP CONSULT  OCCUPATIONAL THERAPY ADULT IP CONSULT  VASCULAR ACCESS ADULT IP CONSULT  VASCULAR ACCESS CARE ADULT IP CONSULT  VASCULAR ACCESS ADULT IP CONSULT    Code Status   DNR/DNI     The patient was discussed with MD Chadwick Alonso 1 Service  [facility], [place]  Pager: [phone]  ______________________________________________________________________    Physical Exam   Vital Signs: Temp: 98.6  F (37  C) Temp src: Oral BP: 166/70 Pulse: 60   Resp: 16 SpO2: 98 % O2 Device: None (Room air)    Weight: 168 lbs 11.2 oz    General Appearance:  Alert, pleasant, lying in bed  HEENT: L eye w/ ptosis, erythema (scleritis) improved bilaterally. EOMI  Respiratory: CTAB   Cardiovascular: RRR no appreciable murmurs           GI: Abd soft, NT ND, normoactive BS  Skin: No tender, warm, erythematous joints. No lesions or rashes seen.  Other:  Affect normal      Primary Care Physician   [facility]    Discharge Orders      Primary Care Provider Directory Appt Request      General info for SNF    Length of Stay Estimate: Short Term Care: Estimated # of Days <30  Condition at Discharge: Improving  Level of care:skilled   Rehabilitation Potential: Good  Admission H&P remains valid and up-to-date: Yes  Recent Chemotherapy: N/A  Use Nursing Home Standing Orders: Yes     Mantoux instructions     Give two-step Mantoux (PPD) Per Facility Policy Yes     Activity - Up ad fernando     Reason for your hospital stay    Dear Mary MCKNIGHT Terrance    Your were hospitalized at [facility] with occular syphilis and treated with antibiotics and steroids.  Over your hospitalization your eye pain improved and today you are ready to be discharged to a transitional care unit.  If you continue antibiotic and steroid therapy you should continue to improve but if you develop fever, shortness of breath, light headedness, chest pain or worsening vision/eye pain please seek medical attention.    We are suggesting the following medication changes:  -Penicillin IV  -Prednisone 60mg Daily until you see your eye doctor this Thursday  -Izoniazid 300mg daily  -B6 daily     Please get the following tests done:  -No scheduled tests    Please set up an appointment with:  -Already scheduled eye appointment this Thursday w/ Humphrey Bergeron MD @ 10am  -PCP to discuss ongoing latent TB treatment    It was a pleasure meeting with you today. Thank you for allowing me and my team the privilege of caring for you today. You are the reason we are here, and I truly hope we provided you with the excellent service you deserve. Please let us know if there is anything else we can do for you so that we can be sure you are leaving completely satisfied with your care experience.    Your hospital unit at the time of discharge is 5A so if you have any questions please call the hospital at [phone] and ask to talk to a nurse on 5A.    Take care!  Madie Rousseau MD, MPH  Internal Medicine Resident  [facility]     Follow Up and recommended labs and tests    Follow up with Humphrey Cook MD in eye clinic.  PCP to follow up with latent TB treatment, monitoring liver function as well     DNR/DNI     Advance Diet as Tolerated    Follow this diet upon discharge: Orders Placed This Encounter      Regular Diet  Adult No Milk to Drink (No Milk on tray)       Significant Results and Procedures   See above for pertinent syphilis labs    Discharge Medications   Discharge Medication List as of 6/11/2019  8:59 AM      START taking these medications    Details   cyclopentolate (CYCLOGYL) 1 % ophthalmic solution Place 1 drop Into the left eye 2 times daily, Disp-2 mL, R-0, Transitional      isoniazid (NYDRAZID) 300 MG tablet Take 1 tablet (300 mg) by mouth daily, Disp-90 tablet, R-2, Transitional      penicillin G potassium 1 Million Units Inject 1 Million Units into the vein continuous, Disp-11 days, R-0, Transitional      predniSONE (DELTASONE) 20 MG tablet Take 3 tablets (60 mg) by mouth daily, Disp-7 tablet, R-0, Transitional      vitamin B6 (PYRIDOXINE) 25 MG tablet Take 1 tablet (25 mg) by mouth daily, Disp-30 tablet, R-0, Transitional         CONTINUE these medications which have CHANGED    Details   acetaminophen (TYLENOL) 500 MG tablet Take 1 tablet (500 mg) by mouth as needed for mild pain or fever, Disp-30 tablet, R-0, Transitional      aspirin 81 MG EC tablet Take 1 tablet (81 mg) by mouth daily, Disp-30 tablet, R-0, Transitional      difluprednate (DUREZOL) 0.05 % ophthalmic emulsion Place 1 drop Into the left eye 4 times daily, Disp-5 mL, R-1, Transitional      diphenhydrAMINE (BENADRYL) 25 MG capsule Take 1 capsule (25 mg) by mouth as needed, Disp-30 capsule, R-0, Transitional      EPINEPHrine (EPIPEN 2-ANJU) 0.3 MG/0.3ML injection 2-pack Inject 0.3 mLs (0.3 mg) into the muscle as needed for anaphylaxis, Disp-1 each, Transitional      indomethacin (INDOCIN) 25 MG capsule Take 1 capsule (25 mg) by mouth 3 times daily, Disp-90 capsule, R-1, Transitional      metoprolol succinate ER (TOPROL-XL) 25 MG 24 hr tablet Take 1 tablet (25 mg) by mouth daily, Disp-90 tablet, R-1, Transitional      omeprazole (PRILOSEC OTC) 20 MG EC tablet Take 1 tablet (20 mg) by mouth daily, Disp-30 tablet, R-0, No Print Out      vitamin C  (ASCORBIC ACID) 500 MG tablet Take 1 tablet (500 mg) by mouth daily, Disp-30 tablet, R-0, Transitional         CONTINUE these medications which have NOT CHANGED    Details   Blood Pressure Monitoring (BLOOD PRESSURE KIT) KIT 1 kit as needed, Disp-1 kit, R-0, E-Prescribe           Allergies   Allergies   Allergen Reactions     Cephalexin      Other reaction(s): Sedation     Codeine Sulfate      Diatrizoate Anaphylaxis     Ibuprofen Sodium      Iodamide      Ivp Dye [Contrast Dye]      Lactose Anaphylaxis     No Clinical Screening - See Comments Anaphylaxis     flu shot     Sulfamethoxazole-Trimethoprim Hives     Other reaction(s): Sedation     Codeine      Other reaction(s): GI Bleeding     Influenza Virus Vaccine H5n1      Iodine      Other reaction(s): Agitation     Thimerosal      Other reaction(s): GI Upset     Tramadol Hives     hives     Pt was seen and examined by me on the day of discharge.  I agree with the detailed follow up plans as documented above.    Emani Robertson MD

## 2019-06-11 NOTE — PLAN OF CARE
OT 5A:    Occupational Therapy Discharge Summary    Reason for therapy discharge:    Discharged to transitional care facility.    Progress towards therapy goal(s). See goals on Care Plan in [facility] electronic health record for goal details.  Goals partially met.  Barriers to achieving goals:   discharge from facility.    Therapy recommendation(s):    Continued therapy is recommended.  Rationale/Recommendations:  to maximize safety and I with ADL.

## 2019-06-11 NOTE — PLAN OF CARE
Time: 1900-0730      Reason for admission: Neurosyphilis (admit 6-7)  Vitals: Temp: 98.5  F (36.9  C) Temp src: Oral BP: 145/57 Pulse: 61   Resp: 16 SpO2: 97 % O2 Device: None (Room air)      Activity: Indenpendent  Pain: denies  Neuro: A/o x4  Cardiac: WDL  Respiratory: WDL  GI/: independent to bathroom   Diet: Regular- no milk   Lines: R PIV, PICC placed 6-10  Skin/Wounds: WDL        New changes this shift: PICC placed- ok to use    Plan: leave for TCU today at 9am daughter bringing her    Continue to monitor and follow POC.

## 2019-06-13 ENCOUNTER — OFFICE VISIT (OUTPATIENT)
Dept: OPHTHALMOLOGY | Facility: CLINIC | Age: 74
End: 2019-06-13
Attending: OPHTHALMOLOGY
Payer: COMMERCIAL

## 2019-06-13 DIAGNOSIS — H20.9 UVEITIS OF LEFT EYE: ICD-10-CM

## 2019-06-13 DIAGNOSIS — Z22.7 LATENT TUBERCULOSIS BY BLOOD TEST: ICD-10-CM

## 2019-06-13 DIAGNOSIS — H15.102 SCLERITIS AND EPISCLERITIS OF LEFT EYE: ICD-10-CM

## 2019-06-13 DIAGNOSIS — H16.9 KERATITIS, BILATERAL: ICD-10-CM

## 2019-06-13 DIAGNOSIS — H15.002 SCLERITIS AND EPISCLERITIS OF LEFT EYE: ICD-10-CM

## 2019-06-13 DIAGNOSIS — A50.31: ICD-10-CM

## 2019-06-13 DIAGNOSIS — H25.13 NUCLEAR AGE-RELATED CATARACT, BOTH EYES: ICD-10-CM

## 2019-06-13 DIAGNOSIS — A53.9 SYPHILIS: ICD-10-CM

## 2019-06-13 DIAGNOSIS — H44.112 PANUVEITIS OF LEFT EYE: Primary | ICD-10-CM

## 2019-06-13 DIAGNOSIS — A52.3 NEUROSYPHILIS: ICD-10-CM

## 2019-06-13 DIAGNOSIS — H16.323 DIFFUSE INTERSTITIAL KERATITIS OF BOTH EYES: ICD-10-CM

## 2019-06-13 PROCEDURE — 92235 FLUORESCEIN ANGRPH MLTIFRAME: CPT | Mod: ZF | Performed by: OPHTHALMOLOGY

## 2019-06-13 PROCEDURE — 40000269 ZZH STATISTIC NO CHARGE FACILITY FEE: Mod: ZF

## 2019-06-13 PROCEDURE — 92134 CPTRZ OPH DX IMG PST SGM RTA: CPT | Mod: ZF | Performed by: OPHTHALMOLOGY

## 2019-06-13 PROCEDURE — G0463 HOSPITAL OUTPT CLINIC VISIT: HCPCS | Mod: ZF

## 2019-06-13 PROCEDURE — 76512 OPH US DX B-SCAN: CPT | Mod: ZF | Performed by: OPHTHALMOLOGY

## 2019-06-13 PROCEDURE — 92285 EXTERNAL OCULAR PHOTOGRAPHY: CPT | Mod: ZF | Performed by: OPHTHALMOLOGY

## 2019-06-13 ASSESSMENT — CONF VISUAL FIELD
OS_INFERIOR_NASAL_RESTRICTION: 1
OD_NORMAL: 1
OS_SUPERIOR_NASAL_RESTRICTION: 1
OS_SUPERIOR_NASAL_RESTRICTION: 1
OS_SUPERIOR_TEMPORAL_RESTRICTION: 1
METHOD: COUNTING FINGERS
OS_INFERIOR_NASAL_RESTRICTION: 1
OS_SUPERIOR_TEMPORAL_RESTRICTION: 1
METHOD: COUNTING FINGERS
OD_NORMAL: 1
OS_INFERIOR_TEMPORAL_RESTRICTION: 1
OS_INFERIOR_TEMPORAL_RESTRICTION: 1

## 2019-06-13 ASSESSMENT — TONOMETRY
OD_IOP_MMHG: 9
OS_IOP_MMHG: 4
IOP_METHOD: TONOPEN
IOP_METHOD: TONOPEN
OD_IOP_MMHG: 9
OS_IOP_MMHG: 4

## 2019-06-13 ASSESSMENT — REFRACTION_WEARINGRX
OD_ADD: +2.25
SPECS_TYPE: BIFOCAL
OS_SPHERE: +0.75
OS_CYLINDER: +0.25
OD_CYLINDER: +0.25
OD_AXIS: 180
OS_AXIS: 006
OD_SPHERE: +1.50
OS_ADD: +2.25

## 2019-06-13 ASSESSMENT — VISUAL ACUITY
OD_CC: 20/200
OD_PH_CC: 20/125
METHOD: SNELLEN - LINEAR
METHOD: SNELLEN - LINEAR
OD_PH_CC+: -1
OS_CC: LP
OD_PH_CC: 20/125
OD_CC: 20/200
OS_CC: LP
OD_PH_CC+: -1

## 2019-06-13 ASSESSMENT — CUP TO DISC RATIO: OD_RATIO: 0.75

## 2019-06-13 NOTE — PATIENT INSTRUCTIONS
Eye Drop Instructions:    Left eye:  1. Durezol (pink/white top) - 1 drop 4 times per day  2. Cyclogyl (red top) - 1 drop 2 times per day  3. Preservative-free lubricating tears (i.e Refresh) - 1 drop every 1-2 hours, as needed for any irritation/discomfort.     Continue oral Prednisone 60mg/day.  Continue Penicillin through PICC line as scheduled.  Continue oral indomethacin and oral omeprazole as previously prescribed.     Space out drops by 3-5 minutes apart.  Shake the eye drop before using.    If you have any increased eye pain, redness, or decreased vision, please call the Eye Clinic right away at [phone].

## 2019-06-13 NOTE — PROGRESS NOTES
HPI     Follow Up     In left eye.  Associated symptoms include Negative for eye pain, floaters, flashes and dryness.              Comments     Panuveitis of left eye. She states LE still very blurry. She opens both eyes and the left closes by itself soon after. States achy pain around eye has resolved.     Missed one day of antibiotics but otherwise has been getting daily PCN at nursing home.  Remains on PO Prednisone 60 mg daily, PO Indomethacin 25 mg TID and Durezol gtts QID in left eye.  Was started on INH w/ pyridoxine prior to hospital discharge for latent TB treatment.    Carlo Larson COT 10:17 AM June 13, 2019             Last edited by Anthony Poe MD on 6/13/2019 11:41 AM. (History)              Initial HPI Comments     Pt Scooby, a rheumatologist, referred pt for scleritis evaluation of LE   Has been seeing Dr. Conteh in [place] for glaucoma and scleritis for   last two years. Last visit with Dr. Conteh 3 weeks ago. Pt using   Prednisolone 4 times a day to BE / Told to use very hot compresses to eyes   immediately following; leaves on eye until compresses cool off. Has been   following this treatment for a week. Patient's daughter states that   multiple different types of glaucoma drops have been used within last two   years, but ineffective to control IOP. Patient states that she has been   told that she also has glaucoma in RE. At the same time, signs and   symptoms of scleritis and infection manifested. Patient states that this   problem started as soon as she was exposed to strong wind which 'hit her   eyes' one day. Daughter unsure which was first to occur, just that   patient's eye rapidly deteriorated. VA is non-existent as far as patient   and daughter's understanding. States that 'had full vision up until 6   months ago'. Daughter states that there is thought to try and control IOP   with glaucoma surgery, but not recommended until full assessment is   complete.   Patient's daughter  states that an unnamed, rare auto-immune disease was   discovered by Dr. Almodovar a week ago and he thought it was odd because   patient does not manifest any signs or symptoms related to it.         Review of systems for the eyes was negative other than the pertinent positives/negatives listed in the HPI.      Assessment & Plan      Mary Carlos is a 74 year old female with the following diagnoses:   1. Panuveitis of left eye    2. Scleritis and episcleritis of left eye    3. Syphilis    4. Keratitis, bilateral    5. Nuclear age-related cataract, both eyes       Interim history:    On workup found to have positive Treponema Ab with positive confirmatory test so was admitted to the hospital 6/7-6/11/19 for initiation of neurosyphilis treatment. Started on IV PCN and continues to receive daily PCN at nursing home. Quantiferon TB test also found to be positive (CXR negative) - thought to be latent TB per history. Started on INH with pyridoxine by medicine team and has follow-up with ID in a few weeks. ID/medicine okay with PO Prednisone at this time. Remains on PO indomethacin and topical Durezol drops.  Vision stable, but pain improving per patient.  Right eye stable and without pain    Her exam today shows stable vision of  20/200 and LP (right eye and left eye). The left eye has slightly improved, now 1-2+ diffuse scleritis without thinning. There is 360 peripheral corneal haze with minimal thinning and an intact epithelium.  The anterior chamber is shallow with 1+ flare and rare cell. The pupil is mostly synechialized and dilates minimally.  Plan for repeat B-scan today.    Differential diagnosis still remains broad at this time and includes syphilis, TB, sympathetic ophthalmia, VKH, HLAB27 associated sclerouveitis, herpes simples virus.      Plan:   Repeat B-scan today and evaluation by retina (concerning with vision decrease)  Has appointment in uveitis clinic 6/28/19  Stop PO Indomethacin  Continue PO Prednisone  60 mg daily  Continue daily PPI  Decrease Durezol to two times a day left eye  Start artificial tears QID PRN   Plan to complete 2 week Penicillin G treatment  Continue INH and pyridoxine per medicine/ID recs    Patient disposition:   Return in about 1 week (around 6/20/2019) for Follow Up (Dr. Fuller).      Anthony Poe MD  Ophthalmology Resident, PGY-2    Attending Physician Attestation:  Complete documentation of historical and exam elements from today's encounter can be found in the full encounter summary report (not reduplicated in this progress note).  I personally obtained the chief complaint(s) and history of present illness.  I confirmed and edited as necessary the review of systems, past medical/surgical history, family history, social history, and examination findings as documented by others; and I examined the patient myself.  I personally reviewed the relevant tests, images, and reports as documented above.  I formulated and edited as necessary the assessment and plan and discussed the findings and management plan with the patient and family. . - Humphrey Fuller MD

## 2019-06-13 NOTE — LETTER
"6/13/2019       RE: Mary Carlos  [id number] [address] [facility] [place] [address]     Dear Colleague,    Thank you for referring your patient, Mary Carlos, to the EYE CLINIC at [facility]. Please see a copy of my visit note below.       CC: loss VA LE   HPI: Mary Carlos is a 74 year old year-old patient referred by Dr Fuller for evaluation and treat of uveitis left eye. 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 Also followed by rheumatology (Scooby).     She presented on 6/6/19 with two weeks worsening pain and with 3+ diffuse scleritis and started treatment with indomethacin 25 mg orally three times a day and switched to Durezol four times a day. Infectious workup revealed Treponema Ab with positive confirmatory test so was admitted to the hospital 6/7-6/11/19 for initiation of neurosyphilis treatment. Started on IV PCN and continues to receive daily PCN at nursing home. Reports eyes are comfortable, vision left eye has worsened and also right eye. Intermittent flashes in the left eye every \"great while\".     Review of systems: Denies h/o syphilis tx, <5 lifetime partners, no h/o STD/STIs, no h/o genital ulcers or lesions.     Laboratory:  Negative: ACE, ANCA, Anti-nuclear antibody (2019), PR3, MPO, HIV,   Positive: +Anti-nuclear antibody 2010, +SSA and SSB, +HLAB27, +RF (IgA), +treponema Ab with negative reflex RPR, though + confirmatory TP-PA     Retinal Imaging:  OCT Macula 6-13-19  right eye: normal foveal contour  left eye: poor view. Macula off Retinal detachment      FA transit right eye 6-13-19  Delayed fill, slow AV transit, focal areas of staining along superior arcade without leakage right eye; left eye without view     Optos Fundus 6-13-19  Consistent with exam both eyes     U/S  6-13-19 left eye   LE: Vitreous debris with significant VRT / tractional membrane occupying much of VC. Concern for RD directly inferiorly, mid-periphery (roughly " 2.5-3 clock hours). Prior U/S (6/6/19) noted tractional membrane inferiorly, today it looks thicker / ropier with aftermovement resembling RD. DDx includes thickened hyaloid d/t inflammation detaching--membrane does not look as thick as typical retinal detachment on U/S (?atrophic), but at tissue sensitivity on A-scan it spikes consistently at 100%, following it to periphery towards ora I cannot detect diminishing echogenicity which would be typical ddx for hyaloid vs. Retina.     Chorioretina / scleral thickening noted, subjectively appears mildly improved today on U/S.     Assessment & Plan:    1. Panuveitis with Retinal detachment left eye  Likely secondray to  syphilis   Infectious work up revealed +treponema Ab with negative reflex RPR, though confirmatory TP-PA positive   Denies h/o syphilis treatment and associated risk factors   B-scan with significant vitreous debris and VRT and Retinal detachment. Retinal detachment  confirm with Optical Coherence Tomography   Vision has declined form count fingers left eye @1' on presentation to LP today     plan   - Continue durezol four times a day left eye   - Continue cyclopentolate twice a day left eye   - Continue oral prednisone 60 mg daily   - Continues on penicillin G IV (start 6/10/19, end date 6/21/19)    Schedule the following:   - 25 g Pars plana vitrectomy (PPV)/ Pars plana lensectomy (PPL) posterior synechiolysis. Membrane peel, air fluid exchange endolaser gas vs Silicone Oil left eye   2 hr surgery  Send vitreous biopsy / culture. Syphilis and TB  General anesthesia    Very Guarded visual prognosis.  I reviewed the indications, risks, benefits, and alternatives of the proposed surgical procedure including, but not limited to, failure to improve vision or further loss of vision,  and need for additional surgery, bleeding, infection, loss of vision and the remote possibility of complications of anesthesia. 1:1000 risk of infection/bleed/loss of eye; 1:100  risk of RD and need for further surgery. Patient aware of prolonged healing after retinal surgery (up to a year after surgery) as well as possibility of a gas/SO  instillation into eye. Patient agreed to proceed with surgery.  I provided multiple opportunities for the questions, answered all questions to the best of my ability, and confirmed that my answers and my discussion were understood.     2. 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 Right eye appears inactive   with recent +Syphilis workup, concern for interstitial keratitis   - Slit lamp photos today   - cornea evaluation today     3. Latent TB   - Quantiferon TB test found to be positive (CXR negative), +contact (brother)   - Thought to be latent TB per history.   - Started on INH with pyridoxine   - Followed by ID as outpatient      4. History of Scleritis   Unclear if prior episodes also related to syphilis  Follows w/ rheumatology (Scooby) and ophthalmology ([facility]ero).   Treated for several years with CsA, multiple rheumatological markers (+Anti-nuclear antibody 2010, +SSA and SSB, +HLAB27, +RF)   Reports management was with cyclosporine/prednisolone, was not managed with oral NSAIDs   - On indomethacin 25 mg orally three times a day and omeprazole ppx - Per Alina     5. Glaucoma   Patient reports history of glaucoma surgery, though no signs of prior tube/trab   IOP 9/4 today   Monitor for now     6. Cataracts both eyes  Visually significant   Monitor for now right eye    return to clinic: post op          Again, thank you for allowing me to participate in the care of your patient.      Sincerely,    Carolee Garza M.D.   of Ophthalmology  Vitreoretinal Surgeon  [facility] Endowed Chair  Department of Ophthalmology & Visual Neurosciences  [facility]  Phone:  [phone]   Fax:  [phone]

## 2019-06-13 NOTE — PROGRESS NOTES
"CC:  IK referral    HPI:  Mary Carlos is a 74 year old year-old patient referred by Dr Fuller for evaluation and treat of uveitis left eye. 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 Also followed by rheumatology ([facility]).      She presented on 6/6/19 with two weeks worsening pain and with 3+ diffuse scleritis and started treatment with indomethacin 25 mg orally three times a day and switched to Durezol four times a day. Infectious workup revealed Treponema Ab with positive confirmatory test so was admitted to the hospital 6/7-6/11/19 for initiation of neurosyphilis treatment. Started on IV PCN and continues to receive daily PCN at nursing home. Reports eyes are comfortable, vision left eye has worsened and also right eye. Intermittent flashes in the left eye every \"great while\".      Review of systems: Denies h/o syphilis tx, <5 lifetime partners, no h/o STD/STIs, no h/o genital ulcers or lesions.      Laboratory:  Negative: ACE, ANCA, Anti-nuclear antibody (2019), PR3, MPO, HIV,   Positive: +Anti-nuclear antibody 2010, +SSA and SSB, +HLAB27, +RF (IgA), +treponema Ab with negative reflex RPR, though + confirmatory TP-PA      Retinal Imaging:  OCT Macula 6-13-19  left eye: poor view. Macula off Retinal detachment      Gtts:  Pt doesn't know what drops she is on as she lives in a nursing home. Patient unsure what color tops the medications are. Does not have nursing home papers with her today.     Per last office visit note, should be on:  -atropine  -durezol       All:  Cephalexin  Codeine  Ibuprofen  Contrast dye  Bactrim    A/P:  1. 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)  -in the setting of multiple positive autoimmune labs (See above) AND recent positive Treponema Ab's, requiring IV Penicillin treatment for neurosyphilis  -initially started on topical durezol and atropine for severe pan-uveitis in addition to PO indomethacin for scleritis  -scleritis appears to have " resolved left eye as no scleral thinning or nodules today  -K thinning peripherally with KNV consistent with active IK -- manage with topical steroids (continue durezol QID) and monitor; should improve/regress with treatment  -no epi defects or scleral thinning in either eye  -right eye was no signficant K thinning but few faint vessels and old haze, appears chronic     -recommend the following instructions (written up and printed out for facility):  Continue durezol QID left eye  Continue cyclogyl BID left eye  Continue oral prednisone 60mg/day  Continue IV PCN through 6/21/19 as scheduled     Continue PO indomethacin and omeprazole as previously prescribed by Dr. Fuller.    Planning for vitrectomy and RD repair with Dr. Garza next Tues.  Has f/u with Dr. Young 6/28 for additional management.    Would continue topical steroid left eye while actively inflammed; can start treatment right eye if IK becomes active (monitor for now, not active and not impacting vision).     F/u Cornea 1-2 months  Discussed with Dr. Stanton. Reviewed prior SLP from last week.    Alison Cooper MD  PGY-5, Cornea Fellow  Ophthalmology    Complete documentation of historical and exam elements from today's encounter can be found in the full encounter summary report (not reduplicated in this progress note). I personally obtained the chief complaint(s) and history of present illness.  I confirmed and edited as necessary the review of systems, past medical/surgical history, family history, social history, and examination findings as documented by others; and I examined the patient myself. I personally reviewed the relevant tests, images, and reports as documented above. I formulated and edited as necessary the assessment and plan and discussed the findings and management plan with the patient and family.     Alison Cooper MD  Cornea Fellow

## 2019-06-13 NOTE — PROGRESS NOTES
"   CC: loss VA LE   HPI: Mary Carlos is a 74 year old year-old patient referred by Dr Fuller for evaluation and treat of uveitis left eye. 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 Also followed by rheumatology ([facility]).     She presented on 6/6/19 with two weeks worsening pain and with 3+ diffuse scleritis and started treatment with indomethacin 25 mg orally three times a day and switched to Durezol four times a day. Infectious workup revealed Treponema Ab with positive confirmatory test so was admitted to the hospital 6/7-6/11/19 for initiation of neurosyphilis treatment. Started on IV PCN and continues to receive daily PCN at nursing home. Reports eyes are comfortable, vision left eye has worsened and also right eye. Intermittent flashes in the left eye every \"great while\".     Review of systems: Denies h/o syphilis tx, <5 lifetime partners, no h/o STD/STIs, no h/o genital ulcers or lesions.     Laboratory:  Negative: ACE, ANCA, Anti-nuclear antibody (2019), PR3, MPO, HIV,   Positive: +Anti-nuclear antibody 2010, +SSA and SSB, +HLAB27, +RF (IgA), +treponema Ab with negative reflex RPR, though + confirmatory TP-PA     Retinal Imaging:  OCT Macula 6-13-19  right eye: normal foveal contour  left eye: poor view. Macula off Retinal detachment      FA transit right eye 6-13-19  Delayed fill, slow AV transit, focal areas of staining along superior arcade without leakage right eye; left eye without view     Optos Fundus 6-13-19  Consistent with exam both eyes     U/S  6-13-19 left eye   LE: Vitreous debris with significant VRT / tractional membrane occupying much of VC. Concern for RD directly inferiorly, mid-periphery (roughly 2.5-3 clock hours). Prior U/S (6/6/19) noted tractional membrane inferiorly, today it looks thicker / ropier with aftermovement resembling RD. DDx includes thickened hyaloid d/t inflammation detaching--membrane does not look as thick as typical retinal detachment " on U/S (?atrophic), but at tissue sensitivity on A-scan it spikes consistently at 100%, following it to periphery towards ora I cannot detect diminishing echogenicity which would be typical ddx for hyaloid vs. Retina.     Chorioretina / scleral thickening noted, subjectively appears mildly improved today on U/S.     Assessment & Plan:    1. Panuveitis with Retinal detachment left eye  Likely secondray to  syphilis   Infectious work up revealed +treponema Ab with negative reflex RPR, though confirmatory TP-PA positive   Denies h/o syphilis treatment and associated risk factors   B-scan with significant vitreous debris and VRT and Retinal detachment. Retinal detachment  confirm with Optical Coherence Tomography   Vision has declined form count fingers left eye @1' on presentation to LP today     plan   - Continue durezol four times a day left eye   - Continue cyclopentolate twice a day left eye   - Continue oral prednisone 60 mg daily   - Continues on penicillin G IV (start 6/10/19, end date 6/21/19)    Schedule the following:   - 25 g Pars plana vitrectomy (PPV)/ Pars plana lensectomy (PPL) posterior synechiolysis. Membrane peel, air fluid exchange endolaser gas vs Silicone Oil left eye   2 hr surgery  Send vitreous biopsy / culture. Syphilis and TB  General anesthesia    Very Guarded visual prognosis.  I reviewed the indications, risks, benefits, and alternatives of the proposed surgical procedure including, but not limited to, failure to improve vision or further loss of vision,  and need for additional surgery, bleeding, infection, loss of vision and the remote possibility of complications of anesthesia. 1:1000 risk of infection/bleed/loss of eye; 1:100 risk of RD and need for further surgery. Patient aware of prolonged healing after retinal surgery (up to a year after surgery) as well as possibility of a gas/SO  instillation into eye. Patient agreed to proceed with surgery.  I provided multiple opportunities for  the questions, answered all questions to the best of my ability, and confirmed that my answers and my discussion were understood.     2. 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 Right eye appears inactive   with recent +Syphilis workup, concern for interstitial keratitis   - Slit lamp photos today   - cornea evaluation today     3. Latent TB   - Quantiferon TB test found to be positive (CXR negative), +contact (brother)   - Thought to be latent TB per history.   - Started on INH with pyridoxine   - Followed by ID as outpatient      4. History of Scleritis   Unclear if prior episodes also related to syphilis  Follows w/ rheumatology ([facility]) and ophthalmology ([facility]).   Treated for several years with CsA, multiple rheumatological markers (+Anti-nuclear antibody 2010, +SSA and SSB, +HLAB27, +RF)   Reports management was with cyclosporine/prednisolone, was not managed with oral NSAIDs   - On indomethacin 25 mg orally three times a day and omeprazole ppx - Per [facility]     5. Glaucoma   Patient reports history of glaucoma surgery, though no signs of prior tube/trab   IOP 9/4 today   Monitor for now     6. Cataracts both eyes  Visually significant   Monitor for now right eye    return to clinic: post op            Terrance Mejia MD  Ophthalmology Resident, PGY-3  [facility]    ~~~~~~~~~~~~~~~~~~~~~~~~~~~~~~~~~~   Complete documentation of historical and exam elements from today's encounter can be found in the full encounter summary report (not reduplicated in this progress note).  I personally obtained the chief complaint(s) and history of present illness.  I confirmed and edited as necessary the review of systems, past medical/surgical history, family history, social history, and examination findings as documented by others; and I examined the patient myself.  I personally reviewed the relevant tests, images, and reports as documented above.  I personally reviewed the  ophthalmic test(s) associated with this encounter, agree with the interpretation(s) as documented by the resident/fellow, and have edited the corresponding report(s) as necessary.   I formulated and edited as necessary the assessment and plan and discussed the findings and management plan with the patient and family    Carolee Garza MD   of Ophthalmology.  Retina Service   Department of Ophthalmology and Visual Neurosciences   [facility]  Phone: [phone]   Fax: [phone]

## 2019-06-17 ENCOUNTER — ANESTHESIA EVENT (OUTPATIENT)
Dept: SURGERY | Facility: CLINIC | Age: 74
End: 2019-06-17
Payer: MEDICARE

## 2019-06-17 ENCOUNTER — HOSPITAL ENCOUNTER (OUTPATIENT)
Facility: CLINIC | Age: 74
Discharge: NURSING, REHAB OR LONG-TERM CARE | End: 2019-06-17
Attending: OPHTHALMOLOGY | Admitting: OPHTHALMOLOGY
Payer: MEDICARE

## 2019-06-17 ENCOUNTER — ANESTHESIA (OUTPATIENT)
Dept: SURGERY | Facility: CLINIC | Age: 74
End: 2019-06-17
Payer: MEDICARE

## 2019-06-17 VITALS
SYSTOLIC BLOOD PRESSURE: 161 MMHG | HEART RATE: 67 BPM | TEMPERATURE: 95.4 F | BODY MASS INDEX: 30.12 KG/M2 | WEIGHT: 170 LBS | HEIGHT: 63 IN | OXYGEN SATURATION: 96 % | DIASTOLIC BLOOD PRESSURE: 79 MMHG | RESPIRATION RATE: 16 BRPM

## 2019-06-17 DIAGNOSIS — A52.3 NEUROSYPHILIS: ICD-10-CM

## 2019-06-17 DIAGNOSIS — Z48.810 AFTERCARE FOLLOWING SURGERY OF A SENSE ORGAN: ICD-10-CM

## 2019-06-17 DIAGNOSIS — H26.222 CATARACT OF LEFT EYE SECONDARY TO OCULAR DISORDER: ICD-10-CM

## 2019-06-17 DIAGNOSIS — H33.052 RECENT RETINAL DETACHMENT OF LEFT EYE, TOTAL/SUBTOTAL: Primary | ICD-10-CM

## 2019-06-17 LAB
BACTERIA SPEC CULT: NORMAL
SPECIMEN SOURCE: NORMAL

## 2019-06-17 PROCEDURE — 87116 MYCOBACTERIA CULTURE: CPT | Performed by: OPHTHALMOLOGY

## 2019-06-17 PROCEDURE — 37000009 ZZH ANESTHESIA TECHNICAL FEE, EACH ADDTL 15 MIN: Performed by: OPHTHALMOLOGY

## 2019-06-17 PROCEDURE — 37000008 ZZH ANESTHESIA TECHNICAL FEE, 1ST 30 MIN: Performed by: OPHTHALMOLOGY

## 2019-06-17 PROCEDURE — 25000566 ZZH SEVOFLURANE, EA 15 MIN: Performed by: OPHTHALMOLOGY

## 2019-06-17 PROCEDURE — 84999 UNLISTED CHEMISTRY PROCEDURE: CPT | Performed by: OPHTHALMOLOGY

## 2019-06-17 PROCEDURE — 87070 CULTURE OTHR SPECIMN AEROBIC: CPT | Performed by: OPHTHALMOLOGY

## 2019-06-17 PROCEDURE — 25000125 ZZHC RX 250: Performed by: NURSE ANESTHETIST, CERTIFIED REGISTERED

## 2019-06-17 PROCEDURE — 27210794 ZZH OR GENERAL SUPPLY STERILE: Performed by: OPHTHALMOLOGY

## 2019-06-17 PROCEDURE — 87102 FUNGUS ISOLATION CULTURE: CPT | Performed by: OPHTHALMOLOGY

## 2019-06-17 PROCEDURE — 71000004 ZZH RECOVERY EYE PHASE 1 LEVEL 1 FIRST HR: Performed by: OPHTHALMOLOGY

## 2019-06-17 PROCEDURE — 36000104 ZZH EYE SURGERY LEVEL 4 1ST 30 MIN: Performed by: OPHTHALMOLOGY

## 2019-06-17 PROCEDURE — 87206 SMEAR FLUORESCENT/ACID STAI: CPT | Mod: XU | Performed by: OPHTHALMOLOGY

## 2019-06-17 PROCEDURE — C1814 RETINAL TAMP, SILICONE OIL: HCPCS | Performed by: OPHTHALMOLOGY

## 2019-06-17 PROCEDURE — 25000128 H RX IP 250 OP 636: Performed by: OPHTHALMOLOGY

## 2019-06-17 PROCEDURE — 87075 CULTR BACTERIA EXCEPT BLOOD: CPT | Performed by: OPHTHALMOLOGY

## 2019-06-17 PROCEDURE — 71000028 ZZH EYE RECOVERY PHASE 2 EACH 15 MINS: Performed by: OPHTHALMOLOGY

## 2019-06-17 PROCEDURE — 25000128 H RX IP 250 OP 636: Performed by: NURSE ANESTHETIST, CERTIFIED REGISTERED

## 2019-06-17 PROCEDURE — 36000105 ZZH EYE SURGERY LEVEL 4 EA 15 ADDTL MIN: Performed by: OPHTHALMOLOGY

## 2019-06-17 PROCEDURE — 87798 DETECT AGENT NOS DNA AMP: CPT | Performed by: OPHTHALMOLOGY

## 2019-06-17 PROCEDURE — 25000125 ZZHC RX 250: Performed by: OPHTHALMOLOGY

## 2019-06-17 PROCEDURE — 25800030 ZZH RX IP 258 OP 636: Performed by: SURGERY

## 2019-06-17 PROCEDURE — 40000170 ZZH STATISTIC PRE-PROCEDURE ASSESSMENT II: Performed by: OPHTHALMOLOGY

## 2019-06-17 DEVICE — IMPLANTABLE DEVICE
Type: IMPLANTABLE DEVICE | Site: EYE | Status: NON-FUNCTIONAL
Removed: 2019-08-02

## 2019-06-17 RX ORDER — TRIAMCINOLONE ACETONIDE 40 MG/ML
INJECTION, SUSPENSION INTRA-ARTICULAR; INTRAMUSCULAR PRN
Status: DISCONTINUED | OUTPATIENT
Start: 2019-06-17 | End: 2019-06-17 | Stop reason: HOSPADM

## 2019-06-17 RX ORDER — PHENYLEPHRINE HYDROCHLORIDE 25 MG/ML
1 SOLUTION/ DROPS OPHTHALMIC
Status: COMPLETED | OUTPATIENT
Start: 2019-06-17 | End: 2019-06-17

## 2019-06-17 RX ORDER — CYCLOPENTOLATE HYDROCHLORIDE 10 MG/ML
1 SOLUTION/ DROPS OPHTHALMIC
Status: COMPLETED | OUTPATIENT
Start: 2019-06-17 | End: 2019-06-17

## 2019-06-17 RX ORDER — SODIUM CHLORIDE, SODIUM LACTATE, POTASSIUM CHLORIDE, CALCIUM CHLORIDE 600; 310; 30; 20 MG/100ML; MG/100ML; MG/100ML; MG/100ML
INJECTION, SOLUTION INTRAVENOUS CONTINUOUS
Status: DISCONTINUED | OUTPATIENT
Start: 2019-06-17 | End: 2019-06-17 | Stop reason: HOSPADM

## 2019-06-17 RX ORDER — NEOMYCIN POLYMYXIN B SULFATES AND DEXAMETHASONE 3.5; 10000; 1 MG/ML; [USP'U]/ML; MG/ML
1 SUSPENSION/ DROPS OPHTHALMIC 4 TIMES DAILY
Qty: 5 ML | Refills: 0 | Status: SHIPPED | OUTPATIENT
Start: 2019-06-17 | End: 2019-09-26

## 2019-06-17 RX ORDER — FENTANYL CITRATE 50 UG/ML
INJECTION, SOLUTION INTRAMUSCULAR; INTRAVENOUS PRN
Status: DISCONTINUED | OUTPATIENT
Start: 2019-06-17 | End: 2019-06-17

## 2019-06-17 RX ORDER — ONDANSETRON 2 MG/ML
INJECTION INTRAMUSCULAR; INTRAVENOUS PRN
Status: DISCONTINUED | OUTPATIENT
Start: 2019-06-17 | End: 2019-06-17

## 2019-06-17 RX ORDER — NEOMYCIN SULFATE, POLYMYXIN B SULFATE, AND DEXAMETHASONE 3.5; 10000; 1 MG/G; [USP'U]/G; MG/G
OINTMENT OPHTHALMIC PRN
Status: DISCONTINUED | OUTPATIENT
Start: 2019-06-17 | End: 2019-06-17 | Stop reason: HOSPADM

## 2019-06-17 RX ORDER — DEXAMETHASONE SODIUM PHOSPHATE 4 MG/ML
INJECTION, SOLUTION INTRA-ARTICULAR; INTRALESIONAL; INTRAMUSCULAR; INTRAVENOUS; SOFT TISSUE PRN
Status: DISCONTINUED | OUTPATIENT
Start: 2019-06-17 | End: 2019-06-17

## 2019-06-17 RX ORDER — EPHEDRINE SULFATE 50 MG/ML
INJECTION, SOLUTION INTRAMUSCULAR; INTRAVENOUS; SUBCUTANEOUS PRN
Status: DISCONTINUED | OUTPATIENT
Start: 2019-06-17 | End: 2019-06-17

## 2019-06-17 RX ORDER — DEXAMETHASONE SODIUM PHOSPHATE 4 MG/ML
INJECTION, SOLUTION INTRA-ARTICULAR; INTRALESIONAL; INTRAMUSCULAR; INTRAVENOUS; SOFT TISSUE PRN
Status: DISCONTINUED | OUTPATIENT
Start: 2019-06-17 | End: 2019-06-17 | Stop reason: HOSPADM

## 2019-06-17 RX ORDER — TROPICAMIDE 10 MG/ML
1 SOLUTION/ DROPS OPHTHALMIC
Status: COMPLETED | OUTPATIENT
Start: 2019-06-17 | End: 2019-06-17

## 2019-06-17 RX ORDER — ATROPINE SULFATE 10 MG/ML
SOLUTION/ DROPS OPHTHALMIC PRN
Status: DISCONTINUED | OUTPATIENT
Start: 2019-06-17 | End: 2019-06-17 | Stop reason: HOSPADM

## 2019-06-17 RX ORDER — CIPROFLOXACIN AND DEXAMETHASONE 3; 1 MG/ML; MG/ML
4 SUSPENSION/ DROPS AURICULAR (OTIC) 2 TIMES DAILY
COMMUNITY
End: 2019-06-26

## 2019-06-17 RX ORDER — LIDOCAINE HYDROCHLORIDE 20 MG/ML
INJECTION, SOLUTION INFILTRATION; PERINEURAL PRN
Status: DISCONTINUED | OUTPATIENT
Start: 2019-06-17 | End: 2019-06-17

## 2019-06-17 RX ORDER — BALANCED SALT SOLUTION 6.4; .75; .48; .3; 3.9; 1.7 MG/ML; MG/ML; MG/ML; MG/ML; MG/ML; MG/ML
SOLUTION OPHTHALMIC PRN
Status: DISCONTINUED | OUTPATIENT
Start: 2019-06-17 | End: 2019-06-17 | Stop reason: HOSPADM

## 2019-06-17 RX ORDER — PROPOFOL 10 MG/ML
INJECTION, EMULSION INTRAVENOUS PRN
Status: DISCONTINUED | OUTPATIENT
Start: 2019-06-17 | End: 2019-06-17

## 2019-06-17 RX ADMIN — TROPICAMIDE 1 DROP: 10 SOLUTION/ DROPS OPHTHALMIC at 10:16

## 2019-06-17 RX ADMIN — TROPICAMIDE 1 DROP: 10 SOLUTION/ DROPS OPHTHALMIC at 10:09

## 2019-06-17 RX ADMIN — PROPOFOL 200 MG: 10 INJECTION, EMULSION INTRAVENOUS at 11:01

## 2019-06-17 RX ADMIN — PHENYLEPHRINE HYDROCHLORIDE 1 DROP: 25 SOLUTION/ DROPS OPHTHALMIC at 10:16

## 2019-06-17 RX ADMIN — DEXAMETHASONE SODIUM PHOSPHATE 4 MG: 4 INJECTION, SOLUTION INTRA-ARTICULAR; INTRALESIONAL; INTRAMUSCULAR; INTRAVENOUS; SOFT TISSUE at 11:09

## 2019-06-17 RX ADMIN — PROPOFOL 10 MG: 10 INJECTION, EMULSION INTRAVENOUS at 13:05

## 2019-06-17 RX ADMIN — FENTANYL CITRATE 50 MCG: 50 INJECTION, SOLUTION INTRAMUSCULAR; INTRAVENOUS at 11:08

## 2019-06-17 RX ADMIN — CYCLOPENTOLATE HYDROCHLORIDE 1 DROP: 10 SOLUTION/ DROPS OPHTHALMIC at 10:09

## 2019-06-17 RX ADMIN — ONDANSETRON 4 MG: 2 INJECTION INTRAMUSCULAR; INTRAVENOUS at 11:12

## 2019-06-17 RX ADMIN — Medication 5 MG: at 11:10

## 2019-06-17 RX ADMIN — LIDOCAINE HYDROCHLORIDE 60 MG: 20 INJECTION, SOLUTION INFILTRATION; PERINEURAL at 11:01

## 2019-06-17 RX ADMIN — PHENYLEPHRINE HYDROCHLORIDE 1 DROP: 25 SOLUTION/ DROPS OPHTHALMIC at 09:54

## 2019-06-17 RX ADMIN — CYCLOPENTOLATE HYDROCHLORIDE 1 DROP: 10 SOLUTION/ DROPS OPHTHALMIC at 09:54

## 2019-06-17 RX ADMIN — PHENYLEPHRINE HYDROCHLORIDE 1 DROP: 25 SOLUTION/ DROPS OPHTHALMIC at 10:09

## 2019-06-17 RX ADMIN — FENTANYL CITRATE 50 MCG: 50 INJECTION, SOLUTION INTRAMUSCULAR; INTRAVENOUS at 11:01

## 2019-06-17 RX ADMIN — PROPOFOL 10 MG: 10 INJECTION, EMULSION INTRAVENOUS at 12:30

## 2019-06-17 RX ADMIN — LIDOCAINE HYDROCHLORIDE 40 MG: 20 INJECTION, SOLUTION INFILTRATION; PERINEURAL at 12:28

## 2019-06-17 RX ADMIN — TROPICAMIDE 1 DROP: 10 SOLUTION/ DROPS OPHTHALMIC at 09:53

## 2019-06-17 RX ADMIN — CYCLOPENTOLATE HYDROCHLORIDE 1 DROP: 10 SOLUTION/ DROPS OPHTHALMIC at 10:16

## 2019-06-17 RX ADMIN — SODIUM CHLORIDE, POTASSIUM CHLORIDE, SODIUM LACTATE AND CALCIUM CHLORIDE: 600; 310; 30; 20 INJECTION, SOLUTION INTRAVENOUS at 10:40

## 2019-06-17 ASSESSMENT — MIFFLIN-ST. JEOR
SCORE: 1235.24
SCORE: 1235.24

## 2019-06-17 NOTE — OR NURSING
Discharge instructions completed with daughter at bedside, as well as called Ang nurse at [facility]. Daughter was given the filled prescription eye drop, as well as the Atropine eye drop and ointment from the OR as directed by the surgeon. Daughter provided with extra copy of the AVS. Daughter transporting the patient back to [facility].

## 2019-06-17 NOTE — DISCHARGE INSTRUCTIONS
Same Day Surgery Discharge Instructions for  Sedation and General Anesthesia       It's not unusual to feel dizzy, light-headed or faint for up to 24 hours after surgery or while taking pain medication.  If you have these symptoms: sit for a few minutes before standing and have someone assist you when you get up to walk or use the bathroom.      You should rest and relax for the next 24 hours. We recommend you make arrangements to have an adult stay with you for at least 24 hours after your discharge.  Avoid hazardous and strenuous activity.      DO NOT DRIVE any vehicle or operate mechanical equipment for 24 hours following the end of your surgery.  Even though you may feel normal, your reactions may be affected by the medication you have received.      Do not drink alcoholic beverages for 24 hours following surgery.       Slowly progress to your regular diet as you feel able. It's not unusual to feel nauseated and/or vomit after receiving anesthesia.  If you develop these symptoms, drink clear liquids (apple juice, ginger ale, broth, 7-up, etc. ) until you feel better.  If your nausea and vomiting persists for 24 hours, please notify your surgeon.        All narcotic pain medications, along with inactivity and anesthesia, can cause constipation. Drinking plenty of liquids and increasing fiber intake will help.      For any questions of a medical nature, call your surgeon.      Do not make important decisions for 24 hours.      If you had general anesthesia, you may have a sore throat for a couple of days related to the breathing tube used during surgery.  You may use Cepacol lozenges to help with this discomfort.  If it worsens or if you develop a fever, contact your surgeon.       If you feel your pain is not well managed with the pain medications prescribed by your surgeon, please contact your surgeon's office to let them know so they can address your concerns.       [facility]  Discharge  Instruction    EyeSurgery [facility]    MD Carolee Laird MD Joseph Terry, MD  Will I have pain?  Some discomfort is normal and expected following surgery. The first few days after surgery you may need to use prescription pain pills. Taking Tylenol (acetaminophen) regularly may help prevent pain.  Discomfort should gradually decrease and Tylenol should be sufficient to relieve pain. A foreign body sensation in the cornea of the eye is very common and caused by sutures placed at surgery. These sutures will go away in one to two weeks. If the pain worsens, you should call the doctor.  Do I need to wear an eye patch?  You do not need to wear an eye patch at home after the doctor has removed the patch on your first day after surgery. However, you may be more comfortable wearing a patch outside in the sun, when sleeping or napping, or in a maria, windy environment.  How much drainage should I have?  You may expect a moderate amount of drainage for a week. Gradually the drainage should decrease. The lids can be cleaned with a clean washcloth and gentle soap or diluted baby shampoo. Wipe the eyelids gently from the nose outward. Some blood in the tears is a normal finding.  Will there be swelling?  Some swelling is normal for about a week or two after which it will gradually decrease. Applying a cool compress, using a clean washcloth for 5 - 10 minutes several times a day may reduce the swelling and make you more comfortable. People may have some swelling of both eyes, especially if face down positioning is required. The white part of the eye may appear very red or bloody for a week or two. This may get worse a few days after surgery. Though the bright red appearance can look frightening, it is a normal finding early after surgery and will resolve in a few weeks.  Will I need to use eye drops?  You will be using several different kinds of eye drops or ointment (salve) when you leave the  [facility]. The directions will be on each bottle or tube. The medication with the red top will keep your eye dilated and may make your eye more sensitive to light. Wearing sunglasses may help. The other medication is a combination antibiotic/steroid to prevent infection and promote healing.  Occasionally a third drop is used to control the pressure in your eye. A new bottle of artificial tears or lubricant ointment may be used along with your prescription eye drops after surgery. You will be using drops from four to eight weeks. Bring all eye medications (drops. ointments, or pills) with you  to each visit.   Always wash your hands before putting in the eye drops. You may wish to have someone else help you. Pull down on the lower lid and squeeze one drop from the bottle being careful not to touch the dropper to your eye or eyelid. One drop is sufficient, but another may be used if the first did not go into the eye. It is often easier to put in the drops if you are reclining or lying down. Wait five (5) minutes after the first drop before using the second drop to allow the medications to absorb into the eye.  How long will it take for my vision to improve?  Your vision should gradually improve, but it may take up to six months to regain your best vision. Frequently, air or gas bubbles are injected into the eye at the time of surgery. This will blur your vision significantly at first. As the bubble becomes smaller it will cause a black line in your vision that moves as you move your head. As the bubble becomes smaller you may notice that it looks more like a bubble or that it will break up into several smaller bubbles. It will take from a few days to a few weeks for the bubble to dissolve and be replaced by clear fluid.  You may notice floaters or double vision after your surgery. These symptoms usually will decrease with time. If the double vision is bothersome patching the eye may help.  If you notice a sudden  worsening in your vision call your doctor.  Are there any physical restrictions after surgery?  If an air or gas bubble was placed in the eye during surgery, you will be asked to spend most of your time (both awake and during the night) with your head in a specific position, frequently face down. As the eye heals and the bubble dissolves there will be less of a need for you to stay in that specific position. You should avoid sleeping on your back until the bubble has totally dissolved and you have been given permission from your surgeon. You should not fly in an airplane or go to high altitudes in the mountains while there is a bubble in your eye. If you should require any other surgery, under general anesthesia, while you still have an air bubble in your eye, have your surgeon or anesthetist contact us prior to your surgery. Some anesthetic agents can make the bubble expand and seriously damage your eye.  Patients that have a gas/air bubble placed in their eye will have a green medical alert band on their wrist.  This band should not be removed until the doctor removes it for you or gives you permission to remove it.     Heavy lifting (greater than 50 pounds), swimming and contact sports should be avoided for about 3 to 4 weeks after surgery.  You may resume your usual sexual activities about one week after surgery.  When may I return to work or my normal activities?  Depending on the type or work, you may return to work within a few days. If your work involves physical activity or driving, you will need to restrict your activities and remain home longer.  You may watch television, look at magazines, or work puzzles. Reading may be uncomfortable for several days, but using the eyes will not cause any damage.  You may go outside as usual. If conditions are windy or maria, wear an eye pad to avoid getting dust or dirt in the eye.  Can I travel?  You cannot fly in an airplane or drive into the mountains as long as the  air or gas bubble remains in your eye.    Are there any driving restrictions?  Someone will need to drive you home from the hospital. Generally driving can be resumed in several days if you have good vision in your other eye. If you do not feel comfortable driving, do not drive! Your depth perception will be decreased so you will want to try driving during the day in light traffic until you feel comfortable driving. You should restrict your driving while you are taking prescription pain pills as they also can affect your judgment.  When can I shower and wash my hair?  You may shower or bathe when you get home, but avoid getting water in your eye. You may want someone to help you shampoo your hair at first.  You may shave, brush your teeth, or comb your hair. Do not use make-up, mascara, or creams/lotions around your eye for several weeks.  When will I see the doctor again?  Generally, you will be seen the first day after surgery and again 1-2 weeks later. If you have not received a return appointment before leaving the hospital, you should call our office during the business hours to arrange an appointment. If you will be seeing your local doctor instead of us, you will need to call that office to set up an appointment.  How do I reach a doctor if I have concerns?  One of our doctors is available by calling [facility] [phone]. Please try to call for routine questions and prescription refills during business hours.  You should call your doctor if:  You notice a sudden decrease in your vision.  Have severe pain or pain increases rather than subsiding.    You notice a new black curtain over your eye that is not the gas bubble. If you have any of these symptoms, you may need to be examined.

## 2019-06-17 NOTE — ANESTHESIA PREPROCEDURE EVALUATION
Anesthesia Pre-Procedure Evaluation    Patient: Mary Carlos   MRN: [id number] : 1945          Preoperative Diagnosis: Panuveitis    Procedure(s):  Left Eye 25 Vitrectomy, Fluid Gas Exchange, Pars Plana Lensectomy, Membrane Peel, Posterior Synechiolysis, Gas Versus Silicone Oil    Past Medical History:   Diagnosis Date     Allergic rhinitis      Arthropathy      Breast cancer (H)      Glaucoma      HTN, goal below 140/90      Intestinal malabsorption      Scleritis      Past Surgical History:   Procedure Laterality Date     APPENDECTOMY       ARTHROSCOPY KNEE       C BREAST RECONSTRUCT W TRAM 1 PEDICL       C TOTAL KNEE ARTHROPLASTY  2008    Bilateral     CHOLECYSTECTOMY       HYSTERECTOMY TOTAL ABDOMINAL      Benign      MASTECTOMY       Allergies   Allergen Reactions     Cephalexin      Other reaction(s): Sedation     Codeine Sulfate      Diatrizoate Anaphylaxis     Ibuprofen Sodium      Iodamide      Ivp Dye [Contrast Dye]      Lactose Anaphylaxis     No Clinical Screening - See Comments Anaphylaxis     flu shot     Sulfamethoxazole-Trimethoprim Hives     Other reaction(s): Sedation     Bactrim [Sulfamethoxazole W/Trimethoprim] Hives     Codeine      Other reaction(s): GI Bleeding     Influenza Virus Vaccine [id number]      Iodine      Other reaction(s): Agitation     Sulfa Drugs Hives     Thimerosal      Other reaction(s): GI Upset     Tramadol Hives     hives     Social History     Tobacco Use     Smoking status: Former Smoker     Smokeless tobacco: Never Used   Substance Use Topics     Alcohol use: No     Prior to Admission medications    Medication Sig Start Date End Date Taking? Authorizing Provider   calcium-vitamin D (OSCAL) 250-125 MG-UNIT TABS per tablet Take 1 tablet by mouth 2 times daily   Yes Reported, Patient   ciprofloxacin-dexamethasone (CIPRODEX) 0.3-0.1 % otic suspension 4 drops 2 times daily   Yes Reported, Patient   acetaminophen (TYLENOL) 500 MG tablet Take 1 tablet (500 mg) by  mouth as needed for mild pain or fever 6/10/19   Madie Rousseau MD   aspirin 81 MG EC tablet Take 1 tablet (81 mg) by mouth daily 6/10/19   Madie Rousseau MD   Blood Pressure Monitoring (BLOOD PRESSURE KIT) KIT 1 kit as needed 10/27/13   Justine Hernandez DO   cyclopentolate (CYCLOGYL) 1 % ophthalmic solution Place 1 drop Into the left eye 2 times daily 6/10/19   Madie Rousseau MD   difluprednate (DUREZOL) 0.05 % ophthalmic emulsion Place 1 drop Into the left eye 4 times daily 6/10/19   Madie Rousseau MD   diphenhydrAMINE (BENADRYL) 25 MG capsule Take 1 capsule (25 mg) by mouth as needed 6/10/19   Madie Rousseau MD   EPINEPHrine (EPIPEN 2-ANJU) 0.3 MG/0.3ML injection 2-pack Inject 0.3 mLs (0.3 mg) into the muscle as needed for anaphylaxis 6/10/19   Madie Rousseau MD   indomethacin (INDOCIN) 25 MG capsule Take 1 capsule (25 mg) by mouth 3 times daily 6/10/19   Madie Rousseau MD   isoniazid (NYDRAZID) 300 MG tablet Take 1 tablet (300 mg) by mouth daily 6/12/19 3/8/20  Madie Rousseau MD   metoprolol succinate ER (TOPROL-XL) 25 MG 24 hr tablet Take 1 tablet (25 mg) by mouth daily 6/10/19   Madie Rousseau MD   omeprazole (PRILOSEC OTC) 20 MG EC tablet Take 1 tablet (20 mg) by mouth daily 6/10/19   Madie Rousseau MD   penicillin G potassium 1 Million Units Inject 1 Million Units into the vein continuous 6/10/19   Madie Rousseau MD   predniSONE (DELTASONE) 20 MG tablet Take 3 tablets (60 mg) by mouth daily 6/11/19   Madie Rousseau MD   vitamin B6 (PYRIDOXINE) 25 MG tablet Take 1 tablet (25 mg) by mouth daily 6/12/19   Madie Rousseau MD   vitamin C (ASCORBIC ACID) 500 MG tablet Take 1 tablet (500 mg) by mouth daily 6/10/19   Mdaie Rousseau MD     Current Facility-Administered Medications Ordered in Epic   Medication Dose Route Frequency Last Rate Last Dose     bupivacaine 0.75% (pf) 4.5mL + lidocaine 2% w/EPI  1:413729 (pf) 4.5mL + hyaluronidase (HYLENEX) 150 units   Retrobulbar Once         cyclopentolate (CYCLOGYL) 1 % ophthalmic solution 1 drop  1 drop Ophthalmic q5 Min Prior to Surgery         lactated ringers infusion   Intravenous Continuous         lidocaine 1 % 0.1-1 mL  0.1-1 mL Other Q1H PRN         phenylephrine (MYDFRIN /MICHAEL-SYNEPHRINE) 2.5 % ophthalmic solution 1 drop  1 drop Ophthalmic q5 Min Prior to Surgery         tropicamide (MYDRIACYL) 1 % ophthalmic solution 1 drop  1 drop Ophthalmic q5 Min Prior to Surgery         No current [facility]-ordered outpatient medications on file.       lactated ringers       Recent Labs   Lab Test 06/08/19  0626 01/02/15  1006    143   POTASSIUM 4.3 4.3   CHLORIDE 112* 110*   CO2 25 25   ANIONGAP 4 8   GLC 93 95   BUN 13 9   CR 0.90 0.85   MAGGY 8.1* 8.6     Recent Labs   Lab Test 06/08/19  0626 01/02/15  1006   WBC 4.5 5.4   HGB 11.6* 14.3    223     No results for input(s): ABO, RH in the last 33669 hours.  No results for input(s): TROPI in the last 77545 hours.  No results for input(s): PH, PCO2, PO2, HCO3 in the last 75151 hours.  No results for input(s): HCG in the last 16852 hours.  Recent Results (from the past 744 hour(s))   XR Chest 2 Views    Narrative    XR CHEST TWO VIEWS   6/6/2019 1:55 PM     HISTORY: Uveitis of left eye. Scleritis and episcleritis of left eye.  Keratitis, bilateral.    COMPARISON: None.      Impression    IMPRESSION: No acute abnormality. Lungs are well-inflated and clear.  Heart size is normal.    VISHNU STARKEY MD   XR Chest Port 1 View    Narrative    Exam: XR CHEST PORT 1 VW, 6/10/2019 9:11 PM    Indication: PICC placement    Comparison: 6/6/2019    Findings:   Single portable AP view of the chest. Right approximately PICC tip  projects over the high right atrium. The cardiomediastinal silhouette  is stable. No pneumothorax or large volume pleural effusion. No focal  airspace opacity. The visualized upper abdomen is unremarkable.  No  acute osseous abnormality.      Impression    Impression:   1. Right approximately PICC tip projects over the high right atrium.  2. Low lung volumes without focal airspace opacity.    I have personally reviewed the examination and initial interpretation  and I agree with the findings.    JD BLEVINS MD       RECENT LABS:       Anesthesia Evaluation     .             ROS/MED HX    ENT/Pulmonary:     (+)allergic rhinitis, , . Other pulmonary disease latent TB, on medication.    Neurologic:     (+)other neuro neurosyphilis     Cardiovascular:     (+) hypertension----. : . . . :. .       METS/Exercise Tolerance:     Hematologic:     (+) Anemia, -      Musculoskeletal:   (+) arthritis,  -       GI/Hepatic:     (+) GERD Other GI/Hepatic intestinal malabsorption      Renal/Genitourinary:         Endo:         Psychiatric:         Infectious Disease:         Malignancy:   (+) Malignancy History of Breast          Other:                          Physical Exam  Normal systems: dental    Airway   Mallampati: I  TM distance: >3 FB  Neck ROM: full    Dental     Cardiovascular   Rhythm and rate: regular and normal      Pulmonary    breath sounds clear to auscultation            Lab Results   Component Value Date    WBC 4.5 06/08/2019    HGB 11.6 (L) 06/08/2019    HCT 36.8 06/08/2019     06/08/2019     06/08/2019    POTASSIUM 4.3 06/08/2019    CHLORIDE 112 (H) 06/08/2019    CO2 25 06/08/2019    BUN 13 06/08/2019    CR 0.90 06/08/2019    GLC 93 06/08/2019    MAGGY 8.1 (L) 06/08/2019    ALBUMIN 2.6 (L) 06/11/2019    PROTTOTAL 6.0 (L) 06/11/2019    ALT 10 06/11/2019    AST 12 06/11/2019    ALKPHOS 90 06/11/2019    BILITOTAL 0.8 06/11/2019    TSH 0.82 01/02/2015       Preop Vitals  BP Readings from Last 3 Encounters:   06/11/19 166/70   01/05/16 118/78   07/31/15 124/80    Pulse Readings from Last 3 Encounters:   06/11/19 60   01/05/16 75   07/31/15 84      Resp Readings from Last 3 Encounters:   06/11/19 16  "  07/31/15 18   07/15/13 20    SpO2 Readings from Last 3 Encounters:   06/11/19 98%   01/05/16 95%   07/31/15 95%      Temp Readings from Last 1 Encounters:   06/11/19 37  C (98.6  F) (Oral)    Ht Readings from Last 1 Encounters:   06/17/19 1.6 m (5' 3\")      Wt Readings from Last 1 Encounters:   06/17/19 77.1 kg (170 lb)    Estimated body mass index is 30.11 kg/m  as calculated from the following:    Height as of this encounter: 1.6 m (5' 3\").    Weight as of this encounter: 77.1 kg (170 lb).       Anesthesia Plan      History & Physical Review  History and physical reviewed and following examination; no interval change.    ASA Status:  2 .    NPO Status:  > 8 hours    Plan for General and LMA with Intravenous and Propofol induction. Maintenance will be Balanced.    PONV prophylaxis:  Ondansetron (or other 5HT-3) and Dexamethasone or Solumedrol       Postoperative Care  Postoperative pain management:  IV analgesics.      Consents  Anesthetic plan, risks, benefits and alternatives discussed with:  Patient..                 Sae Clay MD  "

## 2019-06-17 NOTE — ANESTHESIA POSTPROCEDURE EVALUATION
Patient: Mary Carlos    Procedure(s):  LEFT EYE 25 GAUGE VITRECTOMY, PARS PLANA LENSECTOMY, MEMBRANE PEEL,  ENDOLASER, POSTERIOR SYNECHIOLYSIS, IRIDOTOMY, AIR FLUID EXCHANGE, INFUSION SILICONE OIL    Diagnosis:Panuveitis  Diagnosis Additional Information: No value filed.    Anesthesia Type:  General, LMA    Note:  Anesthesia Post Evaluation    Patient location during evaluation: PACU  Patient participation: Able to fully participate in evaluation  Level of consciousness: awake and alert  Pain management: adequate  Airway patency: patent  Cardiovascular status: acceptable and hemodynamically stable  Respiratory status: nonlabored ventilation, unassisted and acceptable  Hydration status: acceptable  PONV: none             Last vitals:  Vitals:    06/17/19 1415 06/17/19 1430 06/17/19 1440   BP: 160/85 153/78 156/77   Pulse: 69 70 68   Resp: 16 14 18   Temp:      SpO2: 95% 96% 96%         Electronically Signed By: Ramirez Tan MD  June 17, 2019  3:11 PM

## 2019-06-17 NOTE — OP NOTE
SURGEON: Carolee Garza M.D.  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 25 gauge pars plana vitrectomy, posterior synechiolysis, pars plana lensectomy, endolaser, PFO, air fluid exchange, Silicone Oil 1000 cs placement, periperal iridotomy left eye.   2.  Vitreous biopsy left eye   ANESTHESIA: General anesthesia and Retrobulbar block left eye.   COMPLICATIONS: None.   ESTIMATED BLOOD LOSS: Scant.   INDICATIONS: Mary Carlos is a 74 year old patient with a history of retinal detachment left eye here for surgical repair. The patient has history of panuveitis, dense cataract with posterior synchiae. light perception vision. ultrasound showed subtotal Retinal detachment  Left eye. Patient was diagnosed with syphilis and latent TB in the work up for uveitis and currently receiving IV Penicillin.  DESCRIPTION OF PROCEDURE: The patient was taken to the operating room where general anesthesia was administered by the Anesthesia Department. The patient's operative eye was prepped and draped in the usual sterile surgical fashion for ophthalmic surgery, including instillation of 1 drop of 5% povidone iodine. A sterile drape was placed over the face and body and a lid speculum was inserted. The microscope was brought into the operative field.   A paracenthesis was performed and viscoelastic was injected into the Anterior chamber. A posterior synechiolysis was performed with the viscoelastic canula. Four iris hooks were placed to dilate the pupil. Next, a 25-gauge trocar was placed inferotemporally 3.5 mm posterior to the limbus. the infusion cannula was connected and  its intravitreal location was verified by direct visualization. The infusion was turned on. Two other 25-gauge trocars were  placed superior nasally and superior-temporally. The  vitrector and endoilluminator were placed in the eye. Upon entering the eye it was noted the patient had a subtotal Retinal detachment with peripheral areas of ischemia. There were underlying choroidals and a superior tear. A vitreous biopsy was performed with the vitrector attached to a syringe. 1.5 ml of diluted vitreous were sent to the lab for aerobic and anaerobic cultures. Next, a core vitrectomy was performed. Next. A superior sclerotomy was performed with a 20 g MVR blade and the fragmentome was placed through this sclerotomy.  Next, the fragmetome was inserted into the lens and the lens was removed completely. Next, the vitrector and endoilluminator were placed in the eye and a peripheral vitrectomy was performed with the aid of scleral depression and kenalog. Peripheral anterior membranes were aspirated and trim with the vitrector. Next the superior temporal tear was marked with diathermy. An inferior drainage retinotomy was performed and thick subretinal fluid was aspirated. A second temporal drainage retinotomy was performed to aspirate all the thick subretinal fluid. Next, PFO was injected into the eye to flatten the retina.  Endolaser was then completed 360 degrees, around the tear and retinotomies. An inferior periperal iridotomy was performed with the vitrector.  Next an air fluid exchange was performed and the perfluoroctane liquid (PFO) was removed. The retina remained attached under air. The iris hooks were removed, the superior sclerotomy was closed with 7-0 vicryl suture and the overlying peritomy was closed with 6-0 gut suture. The temporal paracenthesis was closed with 10-0 nylon suture. Next, Silicone Oil 1000 cs was injected through the superior nasal trocar and the eye was found to be at a good pressure.   Next, the instruments were removed. The retina remained attached. the trocars were removed and the remaining sclerotomies were closed with 6-0 plain gut sutures.     A retrobulbar block  consisting of a 1:1 mixture of 2% lidocaine and 0.75% Marcaine with epinephrine and Wydase was administered on a 25 gauge needle inferonasally.  0.5 cc each of vancomycin and dexamethasone were injected subconjunctivally.    Atropine and maxitrol ointment were applied to the eye followed by 2 eye pads and a shield.  The patient was awaken by the anesthesia department and transferred to the PACU in stable condition having tolerating the procedure well.

## 2019-06-17 NOTE — ANESTHESIA CARE TRANSFER NOTE
Patient: Mary Carlos    Procedure(s):  LEFT EYE 25 GAUGE VITRECTOMY, PARS PLANA LENSECTOMY, MEMBRANE PEEL,  ENDOLASER, POSTERIOR SYNECHIOLYSIS, IRIDOTOMY, AIR FLUID EXCHANGE, INFUSION SILICONE OIL    Diagnosis: Panuveitis  Diagnosis Additional Information: No value filed.    Anesthesia Type:   General, LMA     Note:  Airway :Face Mask  Patient transferred to:PACU  Comments: At end of procedure, spontaneous respirations, adequate tidal volumes, followed commands to voice, LMA removed atraumatically, oropharynx suctioned, airway patent after LMA removal. Oxygen via facemask at 6 liters per minute to PACU. Oxygen tubing connected to wall O2 in PACU, SpO2, NiBP, and EKG monitors and alarms on and functioning, Ryan Hugger warmer connected to patient gown, report on patient's clinical status given to PACU RN, RN questions answered.Handoff Report: Identifed the Patient, Identified the Reponsible Provider, Reviewed the pertinent medical history, Discussed the surgical course, Reviewed Intra-OP anesthesia mangement and issues during anesthesia, Set expectations for post-procedure period and Allowed opportunity for questions and acknowledgement of understanding      Vitals: (Last set prior to Anesthesia Care Transfer)    CRNA VITALS  6/17/2019 1317 - 6/17/2019 1354      6/17/2019             Pulse:  77    SpO2:  99 %    Resp Rate (observed): 10    Resp Rate (set):  10                Electronically Signed By: ROLANDO Estrada CRNA  June 17, 2019  1:54 PM

## 2019-06-18 ENCOUNTER — OFFICE VISIT (OUTPATIENT)
Dept: OPHTHALMOLOGY | Facility: CLINIC | Age: 74
End: 2019-06-18
Payer: MEDICARE

## 2019-06-18 DIAGNOSIS — Z48.810 AFTERCARE FOLLOWING SURGERY OF A SENSORY ORGAN: ICD-10-CM

## 2019-06-18 DIAGNOSIS — H44.112 PANUVEITIS OF LEFT EYE: Primary | ICD-10-CM

## 2019-06-18 RX ORDER — DIFLUPREDNATE OPHTHALMIC 0.5 MG/ML
1 EMULSION OPHTHALMIC 4 TIMES DAILY
Qty: 1 BOTTLE | Refills: 11 | Status: ON HOLD | OUTPATIENT
Start: 2019-06-18 | End: 2019-07-01

## 2019-06-18 ASSESSMENT — TONOMETRY
OD_IOP_MMHG: 14
IOP_METHOD: TONOPEN
OS_IOP_MMHG: 9

## 2019-06-18 ASSESSMENT — VISUAL ACUITY
OD_SC: 20/150
METHOD: SNELLEN - LINEAR

## 2019-06-18 NOTE — PATIENT INSTRUCTIONS
Maxitrol  (pink top) four times a day  (shake the bottle before)  durezol (white top) 6 times per day left eye   Maxitrol oint at bedtime   Atropine (red top)  once a day   Put the eyedrops 5 minutes a part  Eye shield or glasses at all times x 3 weeks  No heavy lifting     Face down for two days  Follow up in two days at [facility] 9th floor

## 2019-06-18 NOTE — PROGRESS NOTES
Postoperative day 1 status post 25 g Pars plana vitrectomy (PPV)/ Pars plana lensectomy (PPL)/ membrane peel/ endolaser/ posterior synechiolysis/ iris hooks/ retinotomy/ perfluoroctane liquid (PFO)/ air fluid exchange Silicone Oil 1000 cs placemetn left eye for subtotal Retinal detachment  With anterior proliferative vitreoretinopathy ; history of panuveitis. 06/17/19     Slept well  Retina attached  Doing well    Plan:  Position: face down  No aviation  No heavy lifting   Thornton shield at all times  Retina detachment and endophthalmitis precautions were discussed with the patient and was asked to return if any of the those occur    Medications to operative eye  Maxitrol (pink top) four times a day    durezol 6x per day  Maxitrol oint at bedtime  Atropine (red top) once a day     Follow up in 2 days  ~~~~~~~~~~~~~~~~~~~~~~~~~~~~~~~~~~   Complete documentation of historical and exam elements from today's encounter can be found in the full encounter summary report (not reduplicated in this progress note).  I personally obtained the chief complaint(s) and history of present illness.  I confirmed and edited as necessary the review of systems, past medical/surgical history, family history, social history, and examination findings as documented by others; and I examined the patient myself.  I personally reviewed the relevant tests, images, and reports as documented above.  I formulated and edited as necessary the assessment and plan and discussed the findings and management plan with the patient and family    Carolee Garza MD  .  Retina Service   Department of Ophthalmology and Visual Neurosciences   [facility]  Phone: [phone]   Fax: [phone]

## 2019-06-19 LAB
ACID FAST STN SPEC QL: NORMAL
SPECIMEN SOURCE: NORMAL

## 2019-06-20 ENCOUNTER — OFFICE VISIT (OUTPATIENT)
Dept: OPHTHALMOLOGY | Facility: CLINIC | Age: 74
End: 2019-06-20
Attending: OPHTHALMOLOGY
Payer: MEDICARE

## 2019-06-20 DIAGNOSIS — Z48.810 AFTERCARE FOLLOWING SURGERY OF A SENSORY ORGAN: Primary | ICD-10-CM

## 2019-06-20 DIAGNOSIS — Z98.890 POST-OPERATIVE STATE: Primary | ICD-10-CM

## 2019-06-20 PROCEDURE — G0463 HOSPITAL OUTPT CLINIC VISIT: HCPCS | Mod: ZF

## 2019-06-20 ASSESSMENT — REFRACTION_WEARINGRX
OS_ADD: +2.25
OD_ADD: +2.25
OD_SPHERE: +1.50
OS_SPHERE: +0.75
OS_AXIS: 006
OS_CYLINDER: +0.25
OD_AXIS: 180
OD_CYLINDER: +0.25
SPECS_TYPE: BIFOCAL

## 2019-06-20 ASSESSMENT — VISUAL ACUITY
OD_PH_SC: 20/100
OD_SC: 20/125
METHOD: SNELLEN - LINEAR
OS_SC: HM

## 2019-06-20 ASSESSMENT — TONOMETRY
OD_IOP_MMHG: 11
IOP_METHOD: TONOPEN
OS_IOP_MMHG: 06

## 2019-06-20 ASSESSMENT — CONF VISUAL FIELD
OS_INFERIOR_NASAL_RESTRICTION: 1
OS_INFERIOR_TEMPORAL_RESTRICTION: 1
OS_SUPERIOR_TEMPORAL_RESTRICTION: 1
OS_SUPERIOR_NASAL_RESTRICTION: 1
OD_SUPERIOR_TEMPORAL_RESTRICTION: 3

## 2019-06-20 NOTE — NURSING NOTE
Chief Complaints and History of Present Illnesses   Patient presents with     Follow Up     2 day follow up status post PPV left eye     Chief Complaint(s) and History of Present Illness(es)     Follow Up     Comments: 2 day follow up status post PPV left eye              Comments     Pt states vision has improved slightly since last visit, but still very blurry. LE does not ache as much.  Occasional floaters LE, no changes.    Ena GREGORY June 20, 2019 7:46 AM

## 2019-06-20 NOTE — PROGRESS NOTES
Postoperative day 3 status post 25 g Pars plana vitrectomy (PPV)/ Pars plana lensectomy (PPL)/ membrane peel/ endolaser/ posterior synechiolysis/ iris hooks/ retinotomy/ perfluoroctane liquid (PFO)/ air fluid exchange Silicone Oil 1000 cs placemetn left eye for subtotal Retinal detachment  With anterior proliferative vitreoretinopathy ; history of syphilitic panuveitis. 06/17/19     Hazy view of the retina appears attached   Doing well    Plan:  Position: any  No heavy lifting   Thornton shield at all times left eye   Retina detachment and endophthalmitis precautions were discussed with the patient and was asked to return if any of the those occur    Medications to operative eye left eye   Maxitrol (pink top) four times a day    durezol 6x per day  Maxitrol oint at bedtime  Atropine (red top) once a day   Continue same steroid medication. Will taper next week    Follow up in one week with ultrasound. Dilate both eyes   ~~~~~~~~~~~~~~~~~~~~~~~~~~~~~~~~~~   Complete documentation of historical and exam elements from today's encounter can be found in the full encounter summary report (not reduplicated in this progress note).  I personally obtained the chief complaint(s) and history of present illness.  I confirmed and edited as necessary the review of systems, past medical/surgical history, family history, social history, and examination findings as documented by others; and I examined the patient myself.  I personally reviewed the relevant tests, images, and reports as documented above.  I formulated and edited as necessary the assessment and plan and discussed the findings and management plan with the patient and family    Carolee Garza MD  .  Retina Service   Department of Ophthalmology and Visual Neurosciences   [facility]  Phone: [phone]   Fax: [phone]

## 2019-06-20 NOTE — PATIENT INSTRUCTIONS
Position: any  No heavy lifting   Thornton shield at all times left eye   Retina detachment and endophthalmitis precautions were discussed with the patient and was asked to return if any of the those occur    Medications to operative eye left eye   Maxitrol (pink top) four times a day    durezol 6x per day  Maxitrol oint at bedtime  Atropine (red top) once a day     Follow up in one week with ultrasound. Dilate both eyes

## 2019-06-24 LAB
BACTERIA SPEC CULT: NO GROWTH
BACTERIA SPEC CULT: NORMAL
Lab: NORMAL
SPECIMEN SOURCE: NORMAL
SPECIMEN SOURCE: NORMAL

## 2019-06-26 ENCOUNTER — TRANSFERRED RECORDS (OUTPATIENT)
Dept: HEALTH INFORMATION MANAGEMENT | Facility: CLINIC | Age: 74
End: 2019-06-26

## 2019-06-26 ENCOUNTER — HOSPITAL ENCOUNTER (INPATIENT)
Facility: CLINIC | Age: 74
LOS: 5 days | Discharge: HOME | DRG: 378 | End: 2019-07-01
Attending: EMERGENCY MEDICINE | Admitting: INTERNAL MEDICINE
Payer: MEDICARE

## 2019-06-26 DIAGNOSIS — H44.112 PANUVEITIS OF LEFT EYE: ICD-10-CM

## 2019-06-26 DIAGNOSIS — A50.31: ICD-10-CM

## 2019-06-26 DIAGNOSIS — K92.1 MELENA: ICD-10-CM

## 2019-06-26 DIAGNOSIS — D50.0 IRON DEFICIENCY ANEMIA DUE TO CHRONIC BLOOD LOSS: ICD-10-CM

## 2019-06-26 DIAGNOSIS — K57.93 GASTROINTESTINAL HEMORRHAGE ASSOCIATED WITH INTESTINAL DIVERTICULITIS: Primary | ICD-10-CM

## 2019-06-26 DIAGNOSIS — H44.112 PANUVEITIS OF LEFT EYE: Primary | ICD-10-CM

## 2019-06-26 DIAGNOSIS — H20.9 UVEITIS OF LEFT EYE: ICD-10-CM

## 2019-06-26 DIAGNOSIS — K92.2 GASTROINTESTINAL HEMORRHAGE, UNSPECIFIED GASTROINTESTINAL HEMORRHAGE TYPE: ICD-10-CM

## 2019-06-26 LAB
ALBUMIN SERPL-MCNC: 2 G/DL (ref 3.4–5)
ALP SERPL-CCNC: 90 U/L (ref 40–150)
ALT SERPL W P-5'-P-CCNC: 27 U/L (ref 0–50)
ANION GAP SERPL CALCULATED.3IONS-SCNC: 8 MMOL/L (ref 3–14)
AST SERPL W P-5'-P-CCNC: 14 U/L (ref 0–45)
BASOPHILS # BLD AUTO: 0 10E9/L (ref 0–0.2)
BASOPHILS NFR BLD AUTO: 0.1 %
BILIRUB SERPL-MCNC: 0.6 MG/DL (ref 0.2–1.3)
BUN SERPL-MCNC: 40 MG/DL (ref 7–30)
CALCIUM SERPL-MCNC: 7.5 MG/DL (ref 8.5–10.1)
CHLORIDE SERPL-SCNC: 107 MMOL/L (ref 94–109)
CO2 SERPL-SCNC: 22 MMOL/L (ref 20–32)
CREAT SERPL-MCNC: 1.02 MG/DL (ref 0.55–1.02)
CREAT SERPL-MCNC: 1.05 MG/DL (ref 0.52–1.04)
DIFFERENTIAL METHOD BLD: ABNORMAL
EOSINOPHIL # BLD AUTO: 0 10E9/L (ref 0–0.7)
EOSINOPHIL NFR BLD AUTO: 0 %
ERYTHROCYTE [DISTWIDTH] IN BLOOD BY AUTOMATED COUNT: 14.6 % (ref 10–15)
GFR SERPL CREATININE-BSD FRML MDRD: 52 ML/MIN/{1.73_M2}
GFR SERPL CREATININE-BSD FRML MDRD: 54 ML/MIN/1.73M2
GLUCOSE SERPL-MCNC: 286 MG/DL (ref 70–99)
GLUCOSE SERPL-MCNC: 98 MG/DL (ref 70–100)
HCT VFR BLD AUTO: 22.9 % (ref 35–47)
HGB BLD-MCNC: 6.9 G/DL (ref 11.7–15.7)
IMM GRANULOCYTES # BLD: 0.2 10E9/L (ref 0–0.4)
IMM GRANULOCYTES NFR BLD: 1.1 %
LYMPHOCYTES # BLD AUTO: 0.3 10E9/L (ref 0.8–5.3)
LYMPHOCYTES NFR BLD AUTO: 1.8 %
MCH RBC QN AUTO: 32.2 PG (ref 26.5–33)
MCHC RBC AUTO-ENTMCNC: 30.1 G/DL (ref 31.5–36.5)
MCV RBC AUTO: 107 FL (ref 78–100)
MONOCYTES # BLD AUTO: 0.2 10E9/L (ref 0–1.3)
MONOCYTES NFR BLD AUTO: 1.5 %
NEUTROPHILS # BLD AUTO: 13.7 10E9/L (ref 1.6–8.3)
NEUTROPHILS NFR BLD AUTO: 95.5 %
NRBC # BLD AUTO: 0 10*3/UL
NRBC BLD AUTO-RTO: 0 /100
PLATELET # BLD AUTO: 271 10E9/L (ref 150–450)
POTASSIUM SERPL-SCNC: 4.7 MMOL/L (ref 3.4–5.3)
POTASSIUM SERPL-SCNC: 4.7 MMOL/L (ref 3.5–5.1)
PROT SERPL-MCNC: 5.3 G/DL (ref 6.8–8.8)
RBC # BLD AUTO: 2.14 10E12/L (ref 3.8–5.2)
SODIUM SERPL-SCNC: 136 MMOL/L (ref 133–144)
TROPONIN I SERPL-MCNC: <0.015 UG/L (ref 0–0.04)
WBC # BLD AUTO: 14.4 10E9/L (ref 4–11)

## 2019-06-26 PROCEDURE — 99285 EMERGENCY DEPT VISIT HI MDM: CPT | Mod: 25 | Performed by: EMERGENCY MEDICINE

## 2019-06-26 PROCEDURE — 99223 1ST HOSP IP/OBS HIGH 75: CPT | Mod: AI | Performed by: INTERNAL MEDICINE

## 2019-06-26 PROCEDURE — C9113 INJ PANTOPRAZOLE SODIUM, VIA: HCPCS | Performed by: EMERGENCY MEDICINE

## 2019-06-26 PROCEDURE — 25000128 H RX IP 250 OP 636: Performed by: EMERGENCY MEDICINE

## 2019-06-26 PROCEDURE — 96375 TX/PRO/DX INJ NEW DRUG ADDON: CPT | Performed by: EMERGENCY MEDICINE

## 2019-06-26 PROCEDURE — 86923 COMPATIBILITY TEST ELECTRIC: CPT | Performed by: EMERGENCY MEDICINE

## 2019-06-26 PROCEDURE — 85025 COMPLETE CBC W/AUTO DIFF WBC: CPT | Performed by: EMERGENCY MEDICINE

## 2019-06-26 PROCEDURE — 80053 COMPREHEN METABOLIC PANEL: CPT | Performed by: EMERGENCY MEDICINE

## 2019-06-26 PROCEDURE — 96365 THER/PROPH/DIAG IV INF INIT: CPT | Performed by: EMERGENCY MEDICINE

## 2019-06-26 PROCEDURE — 99207 ZZC APP CREDIT; MD BILLING SHARED VISIT: CPT | Performed by: PHYSICIAN ASSISTANT

## 2019-06-26 PROCEDURE — 86900 BLOOD TYPING SEROLOGIC ABO: CPT | Performed by: EMERGENCY MEDICINE

## 2019-06-26 PROCEDURE — 36430 TRANSFUSION BLD/BLD COMPNT: CPT | Performed by: EMERGENCY MEDICINE

## 2019-06-26 PROCEDURE — 12000004 ZZH R&B IMCU UMMC

## 2019-06-26 PROCEDURE — 84484 ASSAY OF TROPONIN QUANT: CPT | Performed by: EMERGENCY MEDICINE

## 2019-06-26 PROCEDURE — 86850 RBC ANTIBODY SCREEN: CPT | Performed by: EMERGENCY MEDICINE

## 2019-06-26 PROCEDURE — 96366 THER/PROPH/DIAG IV INF ADDON: CPT | Performed by: EMERGENCY MEDICINE

## 2019-06-26 PROCEDURE — 99285 EMERGENCY DEPT VISIT HI MDM: CPT | Mod: Z6 | Performed by: EMERGENCY MEDICINE

## 2019-06-26 PROCEDURE — 86901 BLOOD TYPING SEROLOGIC RH(D): CPT | Performed by: EMERGENCY MEDICINE

## 2019-06-26 RX ORDER — NEOMYCIN SULFATE, POLYMYXIN B SULFATE, AND DEXAMETHASONE 3.5; 10000; 1 MG/G; [USP'U]/G; MG/G
0.5 OINTMENT OPHTHALMIC AT BEDTIME
COMMUNITY
End: 2019-09-26

## 2019-06-26 RX ORDER — ATROPINE SULFATE 10 MG/ML
1 SOLUTION/ DROPS OPHTHALMIC DAILY
COMMUNITY
End: 2019-09-05

## 2019-06-26 RX ADMIN — PANTOPRAZOLE SODIUM 40 MG: 40 INJECTION, POWDER, FOR SOLUTION INTRAVENOUS at 17:50

## 2019-06-26 ASSESSMENT — ENCOUNTER SYMPTOMS
ABDOMINAL PAIN: 0
NAUSEA: 1
DIZZINESS: 1
BLOOD IN STOOL: 1

## 2019-06-26 NOTE — ED PROVIDER NOTES
[facility] ([facility])  June 26, 2019    History     Chief Complaint   Patient presents with     Melena     HPI  Mary Carlos is a 74 year old female with history notable for breast cancer (s/p mastectomy), appendectomy, cholecystectomy, total hysterectomy, HTN, and neurosyphilis who presents to the ED with 4 days of melena and dizziness. Patient states she has never had this before and also complains of nausea. She had laboratory studies done at clinic with Hemoglobin of 6.3, so they advised her to come to the ED. she is not taking any anticoagulants.  No history of previous GI bleed.  She denies previous blood transfusion or abdominal pain. No other symptoms noted.              PAST MEDICAL HISTORY  Past Medical History:   Diagnosis Date     Allergic rhinitis      Arthropathy      Breast cancer (H) 2012     Glaucoma      HTN, goal below 140/90      Intestinal malabsorption      Scleritis      PAST SURGICAL HISTORY  Past Surgical History:   Procedure Laterality Date     APPENDECTOMY       ARTHROSCOPY KNEE       C BREAST RECONSTRUCT W TRAM 1 PEDICL       C TOTAL KNEE ARTHROPLASTY  2008    Bilateral     CHOLECYSTECTOMY       HYSTERECTOMY TOTAL ABDOMINAL      Benign      MASTECTOMY  2012     VITRECTOMY PARSPLANA WITH 25 GAUGE SYSTEM Left 6/17/2019    Procedure: LEFT EYE 25 GAUGE VITRECTOMY, PARS PLANA LENSECTOMY, MEMBRANE PEEL,  ENDOLASER, POSTERIOR SYNECHIOLYSIS, IRIDOTOMY, AIR FLUID EXCHANGE, INFUSION SILICONE OIL;  Surgeon: Carolee Garza MD;  Location: [facility]     FAMILY HISTORY  Family History   Problem Relation Age of Onset     Unknown/Adopted Mother      Unknown/Adopted Father      Glaucoma No family hx of      Diabetes No family hx of      Macular Degeneration No family hx of      SOCIAL HISTORY  Social History     Tobacco Use     Smoking status: Former Smoker     Smokeless tobacco: Never Used   Substance Use Topics     Alcohol use: No     MEDICATIONS  No current  facility-administered medications for this encounter.      Current Outpatient Medications   Medication     acetaminophen (TYLENOL) 500 MG tablet     aspirin 81 MG EC tablet     Blood Pressure Monitoring (BLOOD PRESSURE KIT) KIT     calcium-vitamin D (OSCAL) 250-125 MG-UNIT TABS per tablet     ciprofloxacin-dexamethasone (CIPRODEX) 0.3-0.1 % otic suspension     cyclopentolate (CYCLOGYL) 1 % ophthalmic solution     difluprednate (DUREZOL) 0.05 % ophthalmic emulsion     difluprednate (DUREZOL) 0.05 % ophthalmic emulsion     diphenhydrAMINE (BENADRYL) 25 MG capsule     EPINEPHrine (EPIPEN 2-ANJU) 0.3 MG/0.3ML injection 2-pack     indomethacin (INDOCIN) 25 MG capsule     isoniazid (NYDRAZID) 300 MG tablet     metoprolol succinate ER (TOPROL-XL) 25 MG 24 hr tablet     neomycin-polymixin-dexamethasone (MAXITROL) 0.1 % ophthalmic suspension     omeprazole (PRILOSEC OTC) 20 MG EC tablet     penicillin G potassium 1 Million Units     predniSONE (DELTASONE) 20 MG tablet     vitamin B6 (PYRIDOXINE) 25 MG tablet     vitamin C (ASCORBIC ACID) 500 MG tablet     ALLERGIES  Allergies   Allergen Reactions     Cephalexin      Other reaction(s): Sedation     Codeine Sulfate      Diatrizoate Anaphylaxis     Ibuprofen Sodium      Iodamide      Ivp Dye [Contrast Dye]      Lactose Anaphylaxis     No Clinical Screening - See Comments Anaphylaxis     flu shot     Sulfamethoxazole-Trimethoprim Hives     Other reaction(s): Sedation     Bactrim [Sulfamethoxazole W/Trimethoprim] Hives     Codeine      Other reaction(s): GI Bleeding     Influenza Virus Vaccine H5n1      Iodine      Other reaction(s): Agitation     Sulfa Drugs Hives     Thimerosal      Other reaction(s): GI Upset     Tramadol Hives     hives           I have reviewed the Medications, Allergies, Past Medical and Surgical History, and Social History in the Epic system.    Review of Systems   Constitutional: Negative for fever.   HENT: Negative for congestion.    Eyes: Negative for  redness.   Respiratory: Negative for shortness of breath.    Cardiovascular: Negative for chest pain.   Gastrointestinal: Positive for blood in stool and nausea. Negative for abdominal pain.   Genitourinary: Negative for difficulty urinating.   Musculoskeletal: Negative for arthralgias and neck stiffness.   Skin: Negative for color change.   Neurological: Positive for dizziness. Negative for headaches.   Psychiatric/Behavioral: Negative for confusion.   All other systems reviewed and are negative.      Physical Exam   BP: 135/55  Heart Rate: 86  Temp: 97.4  F (36.3  C)  Weight: 76.3 kg (168 lb 3.2 oz)  SpO2: 99 %      Physical Exam   Constitutional: She is oriented to person, place, and time. She appears well-developed and well-nourished. No distress.   HENT:   Head: Normocephalic and atraumatic.   Mouth/Throat: No oropharyngeal exudate.   Eyes: Pupils are equal, round, and reactive to light. Right eye exhibits no discharge. Left eye exhibits no discharge. No scleral icterus.   Neck: Normal range of motion. Neck supple.   Cardiovascular: Normal rate, regular rhythm, normal heart sounds and intact distal pulses. Exam reveals no gallop and no friction rub.   No murmur heard.  Pulmonary/Chest: Effort normal and breath sounds normal. No respiratory distress. She has no wheezes. She exhibits no tenderness.   Abdominal: Soft. Bowel sounds are normal. She exhibits no distension. There is no tenderness.   Genitourinary: Rectal exam shows guaiac positive stool.   Musculoskeletal: Normal range of motion. She exhibits no edema, tenderness or deformity.   Neurological: She is alert and oriented to person, place, and time. No cranial nerve deficit.   Skin: Skin is warm and dry. No rash noted. She is not diaphoretic. No erythema. No pallor.   Psychiatric: She has a normal mood and affect.   Nursing note and vitals reviewed.      ED Course        Procedures   4:45 PM  The patient was seen and examined by Dr. Swain in [facility]                 Critical Care time:  none             Labs Ordered and Resulted from Time of ED Arrival Up to the Time of Departure from the ED   CBC WITH PLATELETS DIFFERENTIAL   COMPREHENSIVE METABOLIC PANEL   TROPONIN I   ABO/RH TYPE AND SCREEN            Assessments & Plan (with Medical Decision Making)   This is a 74-year-old female with melena and anemia.  She noticed black stools for the past 4 days.  She was found to have a hemoglobin in the sixes and sent to this facility.  She is also been feeling lightheaded and nauseous.  On exam Hemoccult is positive.  Hemoglobin was 6.9.  Patient was consented for blood products and 1 unit PRBCs was ordered.  Patient was also given pantoprazole.  I discussed all results with patient and family.  We will admit for further monitoring work-up and treatment.    I have reviewed the nursing notes.    I have reviewed the findings, diagnosis, plan and need for follow up with the patient.       Medication List      There are no discharge medications for this visit.         Final diagnoses:   None     Celestino BLAND, am serving as a trained medical scribe to document services personally performed by Jet Swain DO, based on the provider's statements to me.      Jet BLAND DO, was physically present and have reviewed and verified the accuracy of this note documented by Celestino Sanabria.    6/26/2019   [facility], [place], EMERGENCY DEPARTMENT     Jet Swain DO  06/27/19 0006

## 2019-06-26 NOTE — H&P
[facility], [place]    History and Physical - Hospitalist Service, Gold Night        Date of Admission:  6/26/2019    Assessment & Plan   Mary Carlos is a 74 year old female with history of HTN, ocular syphilis (dx 6/2019), retinal detachment, latent TB (currently on therapy), CKD III and breast cancer s/p masectomy who was admitted to [facility] internal medicine 6/26/19 with melena and hgb 6.9    Acute blood loss anemia: PTA 4 days melena, dizziness, diffuse abdominal discomfort. Found to have hgb 6.3 in clinic and stent to ED. Hgb 6.9 in ED, treated with 1U RBC and IV PPI. WBC 14.4. Trop x1 negative. Denies etoh use. Etiology concerning GIB 2/2 recent prednisone rx and NSAID use vs colitis (given abdominal pain, WBC elevation) vs ischemia vs perforation vs AVM vs other. VSS   - GI not consulted at this time as patient/family does not want scope and wants conservative management at this time only  - Repeapt hgb now and q6h  - CT AP (non-con given YAZMIN)  - Lactic acid   - NPO, IV PPI   - 2 peripheral IV   - Tele  - Hold NSAIDs    CKD III with elevated Cr: BL Cr around 0.9. Elevated to 1.05 in the setting of GIB and poor oral intake. 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 +T pallidum and +trepomena antibodies despite NR anti-treponema RPP. Plan was to treated with PCN with plans for 14 day course (through 6/21), however, appears as if TCU continued PCN through at least 6/25. Now s/p vitrectomy, lensectomy, etc  - Ophthalmology consult to clarify steroid dosing and indomethacin use, need to call in am   - Stop PCN, treatment complete   - Continue Thornton Shield at all times   - Stop Indomethacin   - Continue Cyclopentolate, difluprednate, maxitrol suspension     Latent TB: +Quant gold 6/6/19. CXR 6/6 no  acute findings. Asymptomatic. Brother with h/o TB. LFts normal   - Continue Isoniazid     HTN: PTA on metoprolol. BP stable on admission  - Hold BP meds given GIB      Diet: NPO, IVF as above  DVT Prophylaxis: Pneumatic Compression Devices  Cronin Catheter: not present  Code Status: DNR/DNI    Disposition Plan   Expected discharge: 3-5 days, recommended to prior living facility vs TCU once medically stable, workup complete, etc.  Entered: Mary Troy PA-C 06/26/2019, 6:04 PM     The patient's care was discussed with the patient, family, GI, and attending physician, Dr. Elisa Troy PA-C  [facility], [place]  Pager: [phone]  Please see sticky note for cross cover information  ______________________________________________________________________    Chief Complaint   Melena, dizziness    History is obtained from the patient and medical record     History of Present Illness   Mary Carlos is a 74 year old female with history of HTN, breast cancer s/p masectomy, and neurosyphilis who was admitted to [facility] internal medicine 6/26/19 with melena and hgb 6.9    Patient started to notice dark and black stools around Friday. Since then, she has been more tired and weak. She has not passed out but has felt like she might. Per daughter, may have been repeating herself over the weekend but was not overtly confused. Currently denies any confusion. No chest pain or dyspnea. Some dry heaving but no hematemesis or coffee-ground emesis. Is unaware of and hematochezia. Has some diffuse abdominal cramping. No fever or chills. Denies URI symptoms. No new rash. No urinary symptoms. Stool is consisently loose but not diarrhea. Compliant with PTA prescribed meds. Per TCU MAR, was taking scheduled prednisone and indomethacin. No other NSAIDs. Denies etoh use    Review of Systems    The 10 point Review of Systems is negative other than noted in the HPI or here.     Past Medical History     I have reviewed this patient's medical history and updated it with pertinent information if needed.   Past Medical History:   Diagnosis Date     Allergic rhinitis      Arthropathy      Breast cancer (H) 2012     Glaucoma      HTN, goal below 140/90      Intestinal malabsorption      Scleritis        Past Surgical History   I have reviewed this patient's surgical history and updated it with pertinent information if needed.  Past Surgical History:   Procedure Laterality Date     APPENDECTOMY       ARTHROSCOPY KNEE       C BREAST RECONSTRUCT W TRAM 1 PEDICL       C TOTAL KNEE ARTHROPLASTY      Bilateral     CHOLECYSTECTOMY       HYSTERECTOMY TOTAL ABDOMINAL      Benign      MASTECTOMY  2012     VITRECTOMY PARSPLANA WITH 25 GAUGE SYSTEM Left 2019    Procedure: LEFT EYE 25 GAUGE VITRECTOMY, PARS PLANA LENSECTOMY, MEMBRANE PEEL,  ENDOLASER, POSTERIOR SYNECHIOLYSIS, IRIDOTOMY, AIR FLUID EXCHANGE, INFUSION SILICONE OIL;  Surgeon: Carolee Garza MD;  Location: [facility]       Social History   I have reviewed this patient's social history and updated it with pertinent information if needed.  Social History     Tobacco Use     Smoking status: Former Smoker     Smokeless tobacco: Never Used   Substance Use Topics     Alcohol use: No     Drug use: No       Family History   I have reviewed this patient's family history and updated it with pertinent information if needed.   Family History   Problem Relation Age of Onset     Unknown/Adopted Mother      Unknown/Adopted Father      Glaucoma No family hx of      Diabetes No family hx of      Macular Degeneration No family hx of        Prior to Admission Medications   Prior to Admission Medications   Prescriptions Last Dose Informant Patient Reported? Taking?   Blood Pressure Monitoring (BLOOD PRESSURE KIT) KIT   No No   Si kit as needed   EPINEPHrine (EPIPEN 2-ANJU) 0.3 MG/0.3ML injection 2-pack   No No   Sig: Inject 0.3 mLs (0.3 mg) into the muscle as needed for  anaphylaxis   acetaminophen (TYLENOL) 500 MG tablet   No No   Sig: Take 1 tablet (500 mg) by mouth as needed for mild pain or fever   aspirin 81 MG EC tablet   No No   Sig: Take 1 tablet (81 mg) by mouth daily   calcium-vitamin D (OSCAL) 250-125 MG-UNIT TABS per tablet   Yes No   Sig: Take 1 tablet by mouth 2 times daily   ciprofloxacin-dexamethasone (CIPRODEX) 0.3-0.1 % otic suspension   Yes No   Si drops 2 times daily   cyclopentolate (CYCLOGYL) 1 % ophthalmic solution   No No   Sig: Place 1 drop Into the left eye 2 times daily   difluprednate (DUREZOL) 0.05 % ophthalmic emulsion   No No   Sig: Place 1 drop Into the left eye 4 times daily   diphenhydrAMINE (BENADRYL) 25 MG capsule   No No   Sig: Take 1 capsule (25 mg) by mouth as needed   indomethacin (INDOCIN) 25 MG capsule   No No   Sig: Take 1 capsule (25 mg) by mouth 3 times daily   isoniazid (NYDRAZID) 300 MG tablet   No No   Sig: Take 1 tablet (300 mg) by mouth daily   metoprolol succinate ER (TOPROL-XL) 25 MG 24 hr tablet   No No   Sig: Take 1 tablet (25 mg) by mouth daily   neomycin-polymixin-dexamethasone (MAXITROL) 0.1 % ophthalmic suspension   No No   Sig: Apply 1 drop to eye 4 times daily Instill into operative eye(s) per physician instructions.   omeprazole (PRILOSEC OTC) 20 MG EC tablet   No No   Sig: Take 1 tablet (20 mg) by mouth daily   penicillin G potassium 1 Million Units   No No   Sig: Inject 1 Million Units into the vein continuous   predniSONE (DELTASONE) 20 MG tablet   No No   Sig: Take 3 tablets (60 mg) by mouth daily   vitamin B6 (PYRIDOXINE) 25 MG tablet   No No   Sig: Take 1 tablet (25 mg) by mouth daily   vitamin C (ASCORBIC ACID) 500 MG tablet   No No   Sig: Take 1 tablet (500 mg) by mouth daily      Facility-Administered Medications: None     Allergies   Allergies   Allergen Reactions     Cephalexin      Other reaction(s): Sedation     Codeine Sulfate      Diatrizoate Anaphylaxis     Ibuprofen Sodium      Iodamide      Ivp Dye  [Contrast Dye]      Lactose Anaphylaxis     No Clinical Screening - See Comments Anaphylaxis     flu shot     Sulfamethoxazole-Trimethoprim Hives     Other reaction(s): Sedation     Bactrim [Sulfamethoxazole W/Trimethoprim] Hives     Codeine      Other reaction(s): GI Bleeding     Influenza Virus Vaccine H5n1      Iodine      Other reaction(s): Agitation     Sulfa Drugs Hives     Thimerosal      Other reaction(s): GI Upset     Tramadol Hives     hives       Physical Exam   Vital Signs: Temp: 97.4  F (36.3  C) Temp src: Oral BP: 135/55   Heart Rate: 86   SpO2: 99 % O2 Device: None (Room air)    Weight: 168 lbs 3.2 oz    General Appearance: Alert and oriented x3, family bedside  Eyes: Large sunglasses on  HEENT: Membranes slightly dry  Respiratory: CTAB without wheezing or rales on room air  Cardiovascular: RRR, S1, S2. No murmur noted  GI: Abdomen soft, non-tender with active bowel sounds. No guarding or rebound   Lymph/Hematologic: No peripheral edema, distal pulses palpable   Skin: No rash or jaundice   Musculoskeletal: Moves all extremities     Data   Data reviewed today: I reviewed all medications, new labs and imaging results over the last 24 hours    Recent Labs   Lab 06/26/19  1622   WBC 14.4*   HGB 6.9*   *         POTASSIUM 4.7   CHLORIDE 107   CO2 22   BUN 40*   CR 1.05*   ANIONGAP 8   MAGGY 7.5*   *   ALBUMIN 2.0*   PROTTOTAL 5.3*   BILITOTAL 0.6   ALKPHOS 90   ALT 27   AST 14   TROPI <0.015     No results found for this or any previous visit (from the past 24 hour(s)).

## 2019-06-26 NOTE — ED TRIAGE NOTES
Pt brought in by car with c/o dark stools and dizziness since Saturday. Per pt's daughter her hemoglobin is 6.3 today. Pt denies dizziness at rest. And reports it is only when she is standing. On arrival vitals stable. Pt currently cannot se out of her left eye due to a detached retina which she has surgery on 6/17/19.

## 2019-06-27 ENCOUNTER — APPOINTMENT (OUTPATIENT)
Dept: CT IMAGING | Facility: CLINIC | Age: 74
DRG: 378 | End: 2019-06-27
Attending: PHYSICIAN ASSISTANT
Payer: MEDICARE

## 2019-06-27 ENCOUNTER — APPOINTMENT (OUTPATIENT)
Dept: OCCUPATIONAL THERAPY | Facility: CLINIC | Age: 74
DRG: 378 | End: 2019-06-27
Attending: PHYSICIAN ASSISTANT
Payer: MEDICARE

## 2019-06-27 PROBLEM — K92.2 GI BLEED: Status: ACTIVE | Noted: 2019-06-27

## 2019-06-27 LAB
ALBUMIN SERPL-MCNC: 1.8 G/DL (ref 3.4–5)
ALP SERPL-CCNC: 74 U/L (ref 40–150)
ALT SERPL W P-5'-P-CCNC: 20 U/L (ref 0–50)
ANION GAP SERPL CALCULATED.3IONS-SCNC: 7 MMOL/L (ref 3–14)
AST SERPL W P-5'-P-CCNC: 8 U/L (ref 0–45)
BILIRUB SERPL-MCNC: 1 MG/DL (ref 0.2–1.3)
BLD PROD TYP BPU: NORMAL
BLD UNIT ID BPU: 0
BLOOD PRODUCT CODE: NORMAL
BPU ID: NORMAL
BUN SERPL-MCNC: 36 MG/DL (ref 7–30)
CALCIUM SERPL-MCNC: 7.1 MG/DL (ref 8.5–10.1)
CHLORIDE SERPL-SCNC: 110 MMOL/L (ref 94–109)
CO2 SERPL-SCNC: 24 MMOL/L (ref 20–32)
CREAT SERPL-MCNC: 0.94 MG/DL (ref 0.52–1.04)
ERYTHROCYTE [DISTWIDTH] IN BLOOD BY AUTOMATED COUNT: 17.8 % (ref 10–15)
GFR SERPL CREATININE-BSD FRML MDRD: 60 ML/MIN/{1.73_M2}
GLUCOSE SERPL-MCNC: 109 MG/DL (ref 70–99)
HCT VFR BLD AUTO: 25.5 % (ref 35–47)
HGB BLD-MCNC: 7.2 G/DL (ref 11.7–15.7)
HGB BLD-MCNC: 7.2 G/DL (ref 11.7–15.7)
HGB BLD-MCNC: 7.3 G/DL (ref 11.7–15.7)
HGB BLD-MCNC: 7.8 G/DL (ref 11.7–15.7)
INR PPP: 1.09 (ref 0.86–1.14)
LACTATE BLD-SCNC: 0.7 MMOL/L (ref 0.7–2)
MCH RBC QN AUTO: 30.5 PG (ref 26.5–33)
MCHC RBC AUTO-ENTMCNC: 30.6 G/DL (ref 31.5–36.5)
MCV RBC AUTO: 100 FL (ref 78–100)
PLATELET # BLD AUTO: 207 10E9/L (ref 150–450)
POTASSIUM SERPL-SCNC: 4 MMOL/L (ref 3.4–5.3)
PROT SERPL-MCNC: 4.3 G/DL (ref 6.8–8.8)
RBC # BLD AUTO: 2.56 10E12/L (ref 3.8–5.2)
SODIUM SERPL-SCNC: 142 MMOL/L (ref 133–144)
TRANSFUSION STATUS PATIENT QL: NORMAL
TRANSFUSION STATUS PATIENT QL: NORMAL
WBC # BLD AUTO: 13.8 10E9/L (ref 4–11)

## 2019-06-27 PROCEDURE — 25800030 ZZH RX IP 258 OP 636: Performed by: PHYSICIAN ASSISTANT

## 2019-06-27 PROCEDURE — 25000128 H RX IP 250 OP 636: Performed by: PHYSICIAN ASSISTANT

## 2019-06-27 PROCEDURE — 97535 SELF CARE MNGMENT TRAINING: CPT | Mod: GO | Performed by: OCCUPATIONAL THERAPIST

## 2019-06-27 PROCEDURE — 97530 THERAPEUTIC ACTIVITIES: CPT | Mod: GO | Performed by: OCCUPATIONAL THERAPIST

## 2019-06-27 PROCEDURE — 97165 OT EVAL LOW COMPLEX 30 MIN: CPT | Mod: GO | Performed by: OCCUPATIONAL THERAPIST

## 2019-06-27 PROCEDURE — 36592 COLLECT BLOOD FROM PICC: CPT | Performed by: PHYSICIAN ASSISTANT

## 2019-06-27 PROCEDURE — 85027 COMPLETE CBC AUTOMATED: CPT | Performed by: INTERNAL MEDICINE

## 2019-06-27 PROCEDURE — 25000128 H RX IP 250 OP 636: Performed by: INTERNAL MEDICINE

## 2019-06-27 PROCEDURE — 25000131 ZZH RX MED GY IP 250 OP 636 PS 637: Mod: GY | Performed by: INTERNAL MEDICINE

## 2019-06-27 PROCEDURE — 85610 PROTHROMBIN TIME: CPT | Performed by: INTERNAL MEDICINE

## 2019-06-27 PROCEDURE — 85018 HEMOGLOBIN: CPT | Performed by: PHYSICIAN ASSISTANT

## 2019-06-27 PROCEDURE — 99207 ZZC CDG-MDM COMPONENT: MEETS MODERATE - UP CODED: CPT | Performed by: INTERNAL MEDICINE

## 2019-06-27 PROCEDURE — 25000132 ZZH RX MED GY IP 250 OP 250 PS 637: Mod: GY | Performed by: PHYSICIAN ASSISTANT

## 2019-06-27 PROCEDURE — 99233 SBSQ HOSP IP/OBS HIGH 50: CPT | Performed by: INTERNAL MEDICINE

## 2019-06-27 PROCEDURE — 85018 HEMOGLOBIN: CPT | Performed by: INTERNAL MEDICINE

## 2019-06-27 PROCEDURE — C9113 INJ PANTOPRAZOLE SODIUM, VIA: HCPCS | Performed by: PHYSICIAN ASSISTANT

## 2019-06-27 PROCEDURE — 74176 CT ABD & PELVIS W/O CONTRAST: CPT

## 2019-06-27 PROCEDURE — 25000125 ZZHC RX 250: Performed by: INTERNAL MEDICINE

## 2019-06-27 PROCEDURE — 83605 ASSAY OF LACTIC ACID: CPT | Performed by: INTERNAL MEDICINE

## 2019-06-27 PROCEDURE — 80053 COMPREHEN METABOLIC PANEL: CPT | Performed by: INTERNAL MEDICINE

## 2019-06-27 PROCEDURE — P9016 RBC LEUKOCYTES REDUCED: HCPCS | Performed by: EMERGENCY MEDICINE

## 2019-06-27 PROCEDURE — 85027 COMPLETE CBC AUTOMATED: CPT | Performed by: PHYSICIAN ASSISTANT

## 2019-06-27 PROCEDURE — 12000004 ZZH R&B IMCU UMMC

## 2019-06-27 PROCEDURE — 25000125 ZZHC RX 250: Performed by: PHYSICIAN ASSISTANT

## 2019-06-27 RX ORDER — POLYETHYLENE GLYCOL 3350 17 G/17G
17 POWDER, FOR SOLUTION ORAL DAILY PRN
Status: DISCONTINUED | OUTPATIENT
Start: 2019-06-27 | End: 2019-07-01 | Stop reason: HOSPADM

## 2019-06-27 RX ORDER — PREDNISOLONE ACETATE 10 MG/ML
1 SUSPENSION/ DROPS OPHTHALMIC 4 TIMES DAILY
Status: DISCONTINUED | OUTPATIENT
Start: 2019-06-27 | End: 2019-06-27

## 2019-06-27 RX ORDER — ISONIAZID 300 MG/1
300 TABLET ORAL DAILY
Status: DISCONTINUED | OUTPATIENT
Start: 2019-06-27 | End: 2019-07-01 | Stop reason: HOSPADM

## 2019-06-27 RX ORDER — ACETAMINOPHEN 325 MG/1
650 TABLET ORAL EVERY 4 HOURS PRN
Status: DISCONTINUED | OUTPATIENT
Start: 2019-06-27 | End: 2019-06-27 | Stop reason: CLARIF

## 2019-06-27 RX ORDER — NEOMYCIN SULFATE, POLYMYXIN B SULFATE, AND DEXAMETHASONE 3.5; 10000; 1 MG/G; [USP'U]/G; MG/G
0.5 OINTMENT OPHTHALMIC AT BEDTIME
Status: DISCONTINUED | OUTPATIENT
Start: 2019-06-27 | End: 2019-07-01 | Stop reason: HOSPADM

## 2019-06-27 RX ORDER — CIPROFLOXACIN AND DEXAMETHASONE 3; 1 MG/ML; MG/ML
4 SUSPENSION/ DROPS AURICULAR (OTIC) 2 TIMES DAILY
Status: DISCONTINUED | OUTPATIENT
Start: 2019-06-27 | End: 2019-06-27

## 2019-06-27 RX ORDER — ASCORBIC ACID 500 MG
500 TABLET ORAL DAILY
Status: DISCONTINUED | OUTPATIENT
Start: 2019-06-27 | End: 2019-06-28

## 2019-06-27 RX ORDER — DIFLUPREDNATE OPHTHALMIC 0.5 MG/ML
1 EMULSION OPHTHALMIC
Status: DISCONTINUED | OUTPATIENT
Start: 2019-06-27 | End: 2019-06-27 | Stop reason: RX

## 2019-06-27 RX ORDER — LIDOCAINE 40 MG/G
CREAM TOPICAL
Status: DISCONTINUED | OUTPATIENT
Start: 2019-06-27 | End: 2019-07-01 | Stop reason: HOSPADM

## 2019-06-27 RX ORDER — NEOMYCIN SULFATE, POLYMYXIN B SULFATE AND DEXAMETHASONE 3.5; 10000; 1 MG/ML; [USP'U]/ML; MG/ML
1 SUSPENSION/ DROPS OPHTHALMIC 4 TIMES DAILY
Status: DISCONTINUED | OUTPATIENT
Start: 2019-06-27 | End: 2019-07-01 | Stop reason: HOSPADM

## 2019-06-27 RX ORDER — PREDNISOLONE ACETATE 10 MG/ML
1 SUSPENSION/ DROPS OPHTHALMIC 4 TIMES DAILY
Status: DISCONTINUED | OUTPATIENT
Start: 2019-06-27 | End: 2019-06-29

## 2019-06-27 RX ORDER — PIPERACILLIN SODIUM, TAZOBACTAM SODIUM 3; .375 G/15ML; G/15ML
3.38 INJECTION, POWDER, LYOPHILIZED, FOR SOLUTION INTRAVENOUS EVERY 6 HOURS
Status: DISCONTINUED | OUTPATIENT
Start: 2019-06-27 | End: 2019-07-01

## 2019-06-27 RX ORDER — ONDANSETRON 4 MG/1
4 TABLET, ORALLY DISINTEGRATING ORAL EVERY 6 HOURS PRN
Status: DISCONTINUED | OUTPATIENT
Start: 2019-06-27 | End: 2019-07-01 | Stop reason: HOSPADM

## 2019-06-27 RX ORDER — PYRIDOXINE HCL (VITAMIN B6) 25 MG
25 TABLET ORAL DAILY
Status: DISCONTINUED | OUTPATIENT
Start: 2019-06-27 | End: 2019-07-01 | Stop reason: HOSPADM

## 2019-06-27 RX ORDER — NALOXONE HYDROCHLORIDE 0.4 MG/ML
.1-.4 INJECTION, SOLUTION INTRAMUSCULAR; INTRAVENOUS; SUBCUTANEOUS
Status: DISCONTINUED | OUTPATIENT
Start: 2019-06-27 | End: 2019-07-01 | Stop reason: HOSPADM

## 2019-06-27 RX ORDER — ATROPINE SULFATE 10 MG/ML
1 SOLUTION/ DROPS OPHTHALMIC DAILY
Status: DISCONTINUED | OUTPATIENT
Start: 2019-06-27 | End: 2019-07-01 | Stop reason: HOSPADM

## 2019-06-27 RX ORDER — SODIUM CHLORIDE, SODIUM LACTATE, POTASSIUM CHLORIDE, CALCIUM CHLORIDE 600; 310; 30; 20 MG/100ML; MG/100ML; MG/100ML; MG/100ML
INJECTION, SOLUTION INTRAVENOUS CONTINUOUS
Status: DISCONTINUED | OUTPATIENT
Start: 2019-06-27 | End: 2019-06-30

## 2019-06-27 RX ORDER — CYCLOPENTOLATE HYDROCHLORIDE 10 MG/ML
1 SOLUTION/ DROPS OPHTHALMIC 2 TIMES DAILY
Status: DISCONTINUED | OUTPATIENT
Start: 2019-06-27 | End: 2019-06-27

## 2019-06-27 RX ORDER — ONDANSETRON 2 MG/ML
4 INJECTION INTRAMUSCULAR; INTRAVENOUS EVERY 6 HOURS PRN
Status: DISCONTINUED | OUTPATIENT
Start: 2019-06-27 | End: 2019-07-01 | Stop reason: HOSPADM

## 2019-06-27 RX ADMIN — PIPERACILLIN AND TAZOBACTAM 3.38 G: 3; .375 INJECTION, POWDER, FOR SOLUTION INTRAVENOUS at 15:06

## 2019-06-27 RX ADMIN — NEOMYCIN SULFATE, POLYMYXIN B SULFATE AND DEXAMETHASONE 1 DROP: 3.5; 10000; 1 SUSPENSION OPHTHALMIC at 19:59

## 2019-06-27 RX ADMIN — SODIUM CHLORIDE 80 MG: 9 INJECTION, SOLUTION INTRAVENOUS at 02:04

## 2019-06-27 RX ADMIN — PREDNISOLONE ACETATE 1 DROP: 10 SUSPENSION/ DROPS OPHTHALMIC at 08:11

## 2019-06-27 RX ADMIN — NEOMYCIN SULFATE, POLYMYXIN B SULFATE AND DEXAMETHASONE 1 DROP: 3.5; 10000; 1 SUSPENSION OPHTHALMIC at 08:11

## 2019-06-27 RX ADMIN — SODIUM CHLORIDE 8 MG/HR: 9 INJECTION, SOLUTION INTRAVENOUS at 22:00

## 2019-06-27 RX ADMIN — OXYCODONE HYDROCHLORIDE AND ACETAMINOPHEN 500 MG: 500 TABLET ORAL at 08:11

## 2019-06-27 RX ADMIN — OYSTER SHELL CALCIUM WITH VITAMIN D 1 TABLET: 500; 200 TABLET, FILM COATED ORAL at 20:00

## 2019-06-27 RX ADMIN — ISONIAZID 300 MG: 300 TABLET ORAL at 08:11

## 2019-06-27 RX ADMIN — ATROPINE SULFATE 1 DROP: 10 SOLUTION/ DROPS OPHTHALMIC at 15:05

## 2019-06-27 RX ADMIN — NEOMYCIN SULFATE, POLYMYXIN B SULFATE AND DEXAMETHASONE 1 DROP: 3.5; 10000; 1 SUSPENSION OPHTHALMIC at 12:42

## 2019-06-27 RX ADMIN — OYSTER SHELL CALCIUM WITH VITAMIN D 1 TABLET: 500; 200 TABLET, FILM COATED ORAL at 08:11

## 2019-06-27 RX ADMIN — PREDNISONE 60 MG: 10 TABLET ORAL at 15:05

## 2019-06-27 RX ADMIN — SODIUM CHLORIDE, POTASSIUM CHLORIDE, SODIUM LACTATE AND CALCIUM CHLORIDE: 600; 310; 30; 20 INJECTION, SOLUTION INTRAVENOUS at 06:01

## 2019-06-27 RX ADMIN — Medication 25 MG: at 08:11

## 2019-06-27 RX ADMIN — NEOMYCIN SULFATE, POLYMYXIN B SULFATE, AND DEXAMETHASONE 0.5 G: 3.5; 10000; 1 OINTMENT OPHTHALMIC at 22:02

## 2019-06-27 RX ADMIN — SODIUM CHLORIDE 8 MG/HR: 9 INJECTION, SOLUTION INTRAVENOUS at 02:44

## 2019-06-27 RX ADMIN — PIPERACILLIN AND TAZOBACTAM 3.38 G: 3; .375 INJECTION, POWDER, FOR SOLUTION INTRAVENOUS at 22:01

## 2019-06-27 RX ADMIN — PIPERACILLIN AND TAZOBACTAM 3.38 G: 3; .375 INJECTION, POWDER, FOR SOLUTION INTRAVENOUS at 09:42

## 2019-06-27 RX ADMIN — SODIUM CHLORIDE 8 MG/HR: 9 INJECTION, SOLUTION INTRAVENOUS at 13:03

## 2019-06-27 RX ADMIN — SODIUM CHLORIDE, POTASSIUM CHLORIDE, SODIUM LACTATE AND CALCIUM CHLORIDE: 600; 310; 30; 20 INJECTION, SOLUTION INTRAVENOUS at 16:09

## 2019-06-27 RX ADMIN — PREDNISOLONE ACETATE 1 DROP: 10 SUSPENSION/ DROPS OPHTHALMIC at 12:42

## 2019-06-27 RX ADMIN — PREDNISOLONE ACETATE 1 DROP: 10 SUSPENSION/ DROPS OPHTHALMIC at 20:08

## 2019-06-27 RX ADMIN — SODIUM CHLORIDE, POTASSIUM CHLORIDE, SODIUM LACTATE AND CALCIUM CHLORIDE 1000 ML: 600; 310; 30; 20 INJECTION, SOLUTION INTRAVENOUS at 01:59

## 2019-06-27 RX ADMIN — CYCLOPENTOLATE HYDROCHLORIDE 1 DROP: 10 SOLUTION/ DROPS OPHTHALMIC at 08:11

## 2019-06-27 ASSESSMENT — ENCOUNTER SYMPTOMS
CONFUSION: 0
FEVER: 0
HEADACHES: 0
COLOR CHANGE: 0
EYE REDNESS: 0
SHORTNESS OF BREATH: 0
NECK STIFFNESS: 0
DIFFICULTY URINATING: 0
ARTHRALGIAS: 0

## 2019-06-27 ASSESSMENT — ACTIVITIES OF DAILY LIVING (ADL)
ADLS_ACUITY_SCORE: 14
ADLS_ACUITY_SCORE: 15
ADLS_ACUITY_SCORE: 14
ADLS_ACUITY_SCORE: 15

## 2019-06-27 NOTE — PROGRESS NOTES
"Surgery Progress Note    Patient doing well. Reports some bloating and feels hot. Denies any abdominal pain, nausea or vomiting. Continues with dark stools.     Vital signs:  Temp: 98.2  F (36.8  C) Temp src: Oral BP: 120/55 Pulse: 87 Heart Rate: 91 Resp: 20 SpO2: 98 % O2 Device: None (Room air)     Weight: 76.8 kg (169 lb 4.8 oz)  Estimated body mass index is 29.99 kg/m  as calculated from the following:    Height as of 6/17/19: 1.6 m (5' 3\").    Weight as of this encounter: 76.8 kg (169 lb 4.8 oz).    Abdomen is soft, non distended, nontender.     Will continue to monitor with serial abdominal exams q6h.       Jennifer Ragland MD  Surgery Resident, PGY1  Pager: [phone]            "

## 2019-06-27 NOTE — ED NOTES
[facility], [place]   ED Nurse to Floor Handoff     Mary Carlos is a 74 year old female who speaks English and lives with family members,  in a home  They arrived in the ED by car from home    ED Chief Complaint: Melena    ED Dx;   Final diagnoses:   Melena   Iron deficiency anemia due to chronic blood loss         Needed?: No    Allergies:   Allergies   Allergen Reactions     Cephalexin      Other reaction(s): Sedation     Codeine Sulfate      Diatrizoate Anaphylaxis     Ibuprofen Sodium      Iodamide      Ivp Dye [Contrast Dye]      Lactose Anaphylaxis     No Clinical Screening - See Comments Anaphylaxis     flu shot     Sulfamethoxazole-Trimethoprim Hives     Other reaction(s): Sedation     Bactrim [Sulfamethoxazole W/Trimethoprim] Hives     Codeine      Other reaction(s): GI Bleeding     Influenza Virus Vaccine H5n1      Iodine      Other reaction(s): Agitation     Sulfa Drugs Hives     Thimerosal      Other reaction(s): GI Upset     Tramadol Hives     hives   .  Past Medical Hx:   Past Medical History:   Diagnosis Date     Allergic rhinitis      Arthropathy      Breast cancer (H) 2012     Glaucoma      HTN, goal below 140/90      Intestinal malabsorption      Scleritis       Baseline Mental status: WDL  Current Mental Status changes: at basesline    Infection present or suspected this encounter: no  Sepsis suspected: No  Isolation type: No active isolations     Activity level - Baseline/Home:  Independent  Activity Level - Current:   Stand with Assist    Bariatric equipment needed?: No    In the ED these meds were given:   Medications   pantoprazole (PROTONIX) 40 mg IV push injection (40 mg Intravenous Given 6/26/19 1750)       Drips running?  No    Home pump  No    Current LDAs  PICC Double Lumen 06/10/19 Right Basilic (Active)   Number of days: 16       Incision/Surgical Site 06/17/19 Left Eye (Active)   Number of days: 9       Labs results:   Labs Ordered and  Resulted from Time of ED Arrival Up to the Time of Departure from the ED   CBC WITH PLATELETS DIFFERENTIAL - Abnormal; Notable for the following components:       Result Value    WBC 14.4 (*)     RBC Count 2.14 (*)     Hemoglobin 6.9 (*)     Hematocrit 22.9 (*)      (*)     MCHC 30.1 (*)     Absolute Neutrophil 13.7 (*)     Absolute Lymphocytes 0.3 (*)     All other components within normal limits   COMPREHENSIVE METABOLIC PANEL - Abnormal; Notable for the following components:    Glucose 286 (*)     Urea Nitrogen 40 (*)     Creatinine 1.05 (*)     GFR Estimate 52 (*)     GFR Estimate If Black 60 (*)     Calcium 7.5 (*)     Albumin 2.0 (*)     Protein Total 5.3 (*)     All other components within normal limits   TROPONIN I   ABO/RH TYPE AND SCREEN       Imaging Studies: No results found for this or any previous visit (from the past 24 hour(s)).    Recent vital signs:   /59   Temp 97.4  F (36.3  C) (Oral)   Wt 76.3 kg (168 lb 3.2 oz)   SpO2 99%   BMI 29.80 kg/m      [place] Coma Scale Score: 15 (06/26/19 1910)       Cardiac Rhythm: Normal Sinus  Pt needs tele? No Not currently ordered  Skin/wound Issues: None    Code Status: See prior    Pain control: pt had none    Nausea control: pt had none    Abnormal labs/tests/findings requiring intervention: Hgb 6.9    Family present during ED course? Yes   Family Comments/Social Situation comments: Lives with daughter. Reports dizziness on standing.     Tasks needing completion: None    Karen Iniguez RN  ascom -- *[phone] [facility]  [phone] [facility]

## 2019-06-27 NOTE — PHARMACY-ADMISSION MEDICATION HISTORY
Admission medication history interview status for the 6/26/2019 admission is complete. See Epic admission navigator for allergy information, pharmacy, prior to admission medications and immunization status.    Interview sources:  MAR from [facility], Order Summary Report from [facility] (provided by patient's daughter), Patient, Patient's daughter, Karen ([phone]), Chart Review    Reliability of source: Good - All medications were verified with the MAR from [facility]. The patient was a poor historian and was not able to offer much additional information.    Medication compliance: Good - All medications administered at [facility]    Current Outpatient Pharmacy: [facility] in [place], [place]    Changes made to PTA medication list (reason)  Added:  -Atropine sulfate (per MAR and chart review)  -Maxitrol ophthalmic ointment (per MAR and chart review)    Deleted:   -Acetaminophen: Take 1 tablet by mouth as needed for mild pain or fever (patient reports not taking, not on MAR)  -Ciprofloxacin-dexamethasone 0.3-0.1% otic suspension: Place 4 drops into both ears twice daily (patient reports not taking, not on MAR)    Changed: none    Additional medication history information:   -The patient reports that she is only taking medications as administered at [facility], and denies any additional prescription or over-the-counter medications.    Prior to Admission Medication List:  Prior to Admission medications    Medication Sig Last Dose Taking? Auth Provider   aspirin 81 MG EC tablet Take 1 tablet (81 mg) by mouth daily 6/26/2019 at 0600 Yes Madie Rousseau MD   atropine 1 % ophthalmic solution Place 1 drop Into the left eye daily 6/26/2019 at 0800 Yes Unknown, Entered By History   cyclopentolate (CYCLOGYL) 1 % ophthalmic solution Place 1 drop Into the left eye 2 times daily 6/26/2019 at 0800 Yes Madie Rousseau MD   difluprednate  (DUREZOL) 0.05 % ophthalmic emulsion Place 1 drop Into the left eye 4 times daily 6/26/2019 at 1200 Yes Carolee Garza MD   indomethacin (INDOCIN) 25 MG capsule Take 1 capsule (25 mg) by mouth 3 times daily 6/26/2019 at 1200 Yes Madie Rousseau MD   isoniazid (NYDRAZID) 300 MG tablet Take 1 tablet (300 mg) by mouth daily 6/26/2019 at 0800 Yes Madie Rousseua MD   metoprolol succinate ER (TOPROL-XL) 25 MG 24 hr tablet Take 1 tablet (25 mg) by mouth daily 6/26/2019 at 0800 Yes Madie Rousseau MD   neomycin-polymixin-dexamethasone (MAXITROL) 0.1 % ophthalmic suspension Apply 1 drop to eye 4 times daily Instill into operative eye(s) per physician instructions. 6/26/2019 at 1200 Yes Carolee Garza MD   neomycin-polymyxin-dexamethasone (MAXITROL) 3.5-12103-3.1 ophthalmic ointment Place 0.5 inches Into the left eye At Bedtime 6/25/2019 at PM Yes Unknown, Entered By History   omeprazole (PRILOSEC OTC) 20 MG EC tablet Take 1 tablet (20 mg) by mouth daily 6/25/2019 at 2000 Yes Madie Rousseau MD   penicillin G potassium 1 Million Units Inject 1 Million Units into the vein continuous 6/25/2019 at 1355 Yes Madie Rousseau MD   predniSONE (DELTASONE) 20 MG tablet Take 3 tablets (60 mg) by mouth daily 6/26/2019 at 0800 Yes Madie Rousseau MD   vitamin B6 (PYRIDOXINE) 25 MG tablet Take 1 tablet (25 mg) by mouth daily 6/26/2019 at 0800 Yes Madie Rousseau MD   vitamin C (ASCORBIC ACID) 500 MG tablet Take 1 tablet (500 mg) by mouth daily 6/25/2019 at 2000 Yes Madie Rousseau MD   Blood Pressure Monitoring (BLOOD PRESSURE KIT) KIT 1 kit as needed   Justine Hernandez DO   calcium-vitamin D (OSCAL) 250-125 MG-UNIT TABS per tablet Take 1 tablet by mouth 2 times daily Unknown at Unknown time  Reported, Patient   diphenhydrAMINE (BENADRYL) 25 MG capsule Take 1 capsule (25 mg) by mouth as needed has not needed  Madie Rousseau MD   EPINEPHrine (EPIPEN  2-ANJU) 0.3 MG/0.3ML injection 2-pack Inject 0.3 mLs (0.3 mg) into the muscle as needed for anaphylaxis has not needed  Madie Rousseau MD       Time spent: 45 minutes    Medication history completed by: Juanita Meek

## 2019-06-27 NOTE — PROGRESS NOTES
Admission          6/27/2019  5:30 PM  -----------------------------------------------------------  Reason for admission: GI bleed, with low Hmg  Primary team notified of pt arrival.  Admitted from:   Via: Wheelchair, pt walked to bed  Belongings: Placed near pt  Admission Profile: Pending  Teaching: orientation to unit and call light- call light within reach, call don't fall, use of console, meal times, when to call for the RN, and enforced importance of safety   Access: R PICC  Telemetry: Placed on pt  Ht./Wt.: complete  2 RN Skin Assessment Completed By: Batsheva MOSLEY; No skin concerns  Pt status: Pt A/Ox4, denies pain, denies nausea. Protonix drip infusing. Up SBA, states some dizziness with ambulation.    Temp:  [97.4  F (36.3  C)-98.7  F (37.1  C)] 98.7  F (37.1  C)  Pulse:  [65-87] 87  Heart Rate:  [65-87] 77  Resp:  [14-18] 18  BP: (121-138)/(43-59) 137/59  SpO2:  [98 %-100 %] 100 %

## 2019-06-27 NOTE — CONSULTS
Surgery Admission History and Physical     Mary Carlos MRN# [id number]   YOB: 1945 Age: 74 year old      Date of Admission:  6/26/2019    CC: Abdominal pain and melena     HPI: Mary Carlos is a 74 year old female with history of HTN, ocular syphilis (dx 6/2019), retinal detachment, latent TB (currently on therapy), CKD III and breast cancer s/p masectomy who was admitted to [facility] internal medicine 6/26/19 with melena and hgb 6.9. The patient had begun to notice melena and abdominal pain 4 days PTA. This was associated with weakness, dizziness and some dry heaving but no vomiting. Her last BM was yesterday and was black consistent with the melena she has been having. Today she has no abdominal pain. She has some dry heaving but no vomiting. She gets nauseated when she tries to eat large meals. No fevers, chills, rash, or urinary symptoms. Her daughter Brook was on the phone and reiterated the above story. CT AP was obtained yesterday d/t melena and findings were concerning for possible small bowel perforation vs. Small bowel diverticulum which resulted in the surgery consult.     ROS:  The remainder of the complete ROS was negative unless noted in the HPI.    Past Medical History:  Past Medical History:   Diagnosis Date     Allergic rhinitis      Arthropathy      Breast cancer (H) 2012     Glaucoma      HTN, goal below 140/90      Intestinal malabsorption      Scleritis        Past Surgical History:  Past Surgical History:   Procedure Laterality Date     APPENDECTOMY       ARTHROSCOPY KNEE       C BREAST RECONSTRUCT W TRAM 1 PEDICL       C TOTAL KNEE ARTHROPLASTY  2008    Bilateral     CHOLECYSTECTOMY       HYSTERECTOMY TOTAL ABDOMINAL      Benign      MASTECTOMY  2012     VITRECTOMY PARSPLANA WITH 25 GAUGE SYSTEM Left 6/17/2019    Procedure: LEFT EYE 25 GAUGE VITRECTOMY, PARS PLANA LENSECTOMY, MEMBRANE PEEL,  ENDOLASER, POSTERIOR SYNECHIOLYSIS, IRIDOTOMY, AIR FLUID EXCHANGE, INFUSION SILICONE OIL;   Surgeon: Carolee Garza MD;  Location: [facility]       Allergies:     Allergies   Allergen Reactions     Cephalexin      Other reaction(s): Sedation     Codeine Sulfate      Diatrizoate Anaphylaxis     Ibuprofen Sodium      Iodamide      Ivp Dye [Contrast Dye]      Lactose Anaphylaxis     No Clinical Screening - See Comments Anaphylaxis     flu shot     Sulfamethoxazole-Trimethoprim Hives     Other reaction(s): Sedation     Bactrim [Sulfamethoxazole W/Trimethoprim] Hives     Codeine      Other reaction(s): GI Bleeding     Influenza Virus Vaccine H5n1      Iodine      Other reaction(s): Agitation     Sulfa Drugs Hives     Thimerosal      Other reaction(s): GI Upset     Tramadol Hives     hives       Medications:    No current facility-administered medications on file prior to encounter.   Current Outpatient Medications on File Prior to Encounter:  aspirin 81 MG EC tablet Take 1 tablet (81 mg) by mouth daily   atropine 1 % ophthalmic solution Place 1 drop Into the left eye daily   cyclopentolate (CYCLOGYL) 1 % ophthalmic solution Place 1 drop Into the left eye 2 times daily   difluprednate (DUREZOL) 0.05 % ophthalmic emulsion Place 1 drop Into the left eye 4 times daily   indomethacin (INDOCIN) 25 MG capsule Take 1 capsule (25 mg) by mouth 3 times daily   isoniazid (NYDRAZID) 300 MG tablet Take 1 tablet (300 mg) by mouth daily   metoprolol succinate ER (TOPROL-XL) 25 MG 24 hr tablet Take 1 tablet (25 mg) by mouth daily   neomycin-polymixin-dexamethasone (MAXITROL) 0.1 % ophthalmic suspension Apply 1 drop to eye 4 times daily Instill into operative eye(s) per physician instructions.   neomycin-polymyxin-dexamethasone (MAXITROL) 3.5-84508-3.1 ophthalmic ointment Place 0.5 inches Into the left eye At Bedtime   omeprazole (PRILOSEC OTC) 20 MG EC tablet Take 1 tablet (20 mg) by mouth daily   penicillin G potassium 1 Million Units Inject 1 Million Units into the vein continuous   predniSONE (DELTASONE) 20 MG tablet  Take 3 tablets (60 mg) by mouth daily   vitamin B6 (PYRIDOXINE) 25 MG tablet Take 1 tablet (25 mg) by mouth daily   vitamin C (ASCORBIC ACID) 500 MG tablet Take 1 tablet (500 mg) by mouth daily   Blood Pressure Monitoring (BLOOD PRESSURE KIT) KIT 1 kit as needed   calcium-vitamin D (OSCAL) 250-125 MG-UNIT TABS per tablet Take 1 tablet by mouth 2 times daily   diphenhydrAMINE (BENADRYL) 25 MG capsule Take 1 capsule (25 mg) by mouth as needed   EPINEPHrine (EPIPEN 2-ANJU) 0.3 MG/0.3ML injection 2-pack Inject 0.3 mLs (0.3 mg) into the muscle as needed for anaphylaxis       Social History:  Social History     Tobacco Use     Smoking status: Former Smoker     Smokeless tobacco: Never Used   Substance Use Topics     Alcohol use: No     Drug use: No      Denies tobacco, alcohol and illicit drug use    Family History:  Family History   Problem Relation Age of Onset     Unknown/Adopted Mother      Unknown/Adopted Father      Glaucoma No family hx of      Diabetes No family hx of      Macular Degeneration No family hx of        Exam:  /54 (BP Location: Left arm)   Pulse 87   Temp 98  F (36.7  C) (Oral)   Resp 18   Wt 76.8 kg (169 lb 4.8 oz)   SpO2 100%   BMI 29.99 kg/m    General: Alert, interactive, & in NAD  Resp: breathing non-labored  Cardiac: Regular rate; extremities warm  Abdomen: Soft, nontender, nondistended. No peritoneal signs. Large horizontal incisional scar on right side of abdomen superior to the umbilicus. Suprapubic incisional scar.   Extremities: No LE edema or obvious joint abnormalities  Skin: Warm and dry, no jaundice or rash    Labs:  Recent Labs   Lab 06/27/19  0315 06/26/19  1622   WBC 13.8* 14.4*   HGB 7.8* 6.9*    107*    271   INR 1.09  --     136   POTASSIUM 4.0 4.7   CHLORIDE 110* 107   CO2 24 22   BUN 36* 40*   CR 0.94 1.05*   ANIONGAP 7 8   MAGGY 7.1* 7.5*   * 286*   ALBUMIN 1.8* 2.0*   PROTTOTAL 4.3* 5.3*   BILITOTAL 1.0 0.6   ALKPHOS 74 90   ALT 20 27   AST  8 14   TROPI  --  <0.015             Imaging:   Recent Results (from the past 24 hour(s))   CT Abdomen Pelvis w/o Contrast    Narrative    EXAMINATION: CT ABDOMEN PELVIS W/O CONTRAST, 6/27/2019 1:37 AM    TECHNIQUE:  Helical CT images from the lung bases through the  symphysis pubis were obtained without IV contrast. Contrast dose: None    COMPARISON: None available    HISTORY: Abd pain, gastroenteritis or colitis suspected; Melena with  abdominal discomfort and WBC elevation    FINDINGS:    Liver: Normal parenchymal attenuation without focal mass.  Biliary system: Gallbladder is nonvisualized.. No intrahepatic or  extrahepatic biliary ductal dilatation.  Pancreas: No focal mass or dilation of the main pancreatic duct. The  pancreas is atrophic.  Stomach: Within normal limits.  Spleen: Within normal limits.  Adrenal glands: Within normal limits.  Kidneys: No focal mass, hydronephrosis, or stone.  Bladder: Within normal limits.  Reproductive organs: Postoperative changes of hysterectomy with  calcification along the right aspect of the vaginal cuff.  Colon: Multiple colonic diverticuli without adjacent fat stranding.  Appendix: Not visualized  Small Bowel: Multiple small bowel diverticuli with inflamed  diverticulum in the mid abdomen in a segment of mid ileum with  adjacent mesenteric stranding is demonstrated on series 3, image 34  and series 5, image 279 through 312. There is a moderate-sized central  focus of air adjacent to the bowel measuring 1.5 x 1.9 cm without  definable wall. No free fluid. Mild central mesenteric stranding  throughout the remainder of the central mesentery.  Lymph nodes: No intra-abdominal or pelvic lymphadenopathy.  Vasculature: Within normal limits.    Lung bases: Mild atelectatic changes. Mild coronary calcification.    Bones and soft tissues: No suspicious soft tissue mass or fluid  collection. No suspicious osseous lesion. Fat filled ventral hernias.  Atrophy of the rectus abdominis  muscles. Postoperative changes of the  left breast. The bones are osteopenic.      Impression    IMPRESSION:   1. Findings consistent with small bowel diverticulitis in a segment of  mid ileum. There is a focus of air adjacent to this area of bowel  measuring 1.9 cm without definable wall. This may be a contained  perforation versus larger diverticulum. No abscess.       Assessment/Plan: Mary Carlos is a 74 year old female with history of HTN, ocular syphilis (dx 6/2019), retinal detachment, latent TB (currently on therapy), CKD III and breast cancer s/p masectomy who was admitted to [facility] internal medicine 6/26/19 with melena, hgb 6.9, WBC 14.4 and new concern for small bowel perforation. CT AP 6/26 demonstrated findings consistent with small bowel diverticulitis in a segment of mid ileum. There is a focus of air adjacent to this area of bowel measuring 1.9 cm without definable wall and may be a contained perforation versus larger diverticulum. No abscess. Patient continues to have melenotic stool but has no abdominal pain at this time. We discussed imaging further with radiology and at this time a contained perforation is inconclusive but there is definite small bowel diverticulitis.   - CT AP does not demonstrate conclusive perforation and patient has a benign abdominal exam. No indication for emergent surgical intervention at this time.   - NPO  - Serial abdominal exams   - IV antibiotics for suspected small bowel diverticulitis   - Agree with IV PPI       Discussed with staff, Dr. Teddy Barriga, PGY-2  General Surgery

## 2019-06-27 NOTE — PLAN OF CARE
[id number]  /59 (BP Location: Left arm)   Pulse 87   Temp 98.7  F (37.1  C) (Oral)   Resp 18   Wt 76.8 kg (169 lb 4.8 oz)   SpO2 100%   BMI 29.99 kg/m      Neuro: A&Ox4.   Cardiac: SR. VSS.   Respiratory: On RA.  GI/: Adequate urine output. No BM since   Diet/appetite: Denies nausea, Pt NPO  Activity:  SBA, up to chair and in halls.  Pain: Denies pain  Skin: Intact  Lines: PICC infusing LR and Protonix drip    R: Continue with POC. Notify primary team with changes.

## 2019-06-27 NOTE — PLAN OF CARE
Discharge Planner OT   Patient plan for discharge: home  Current status: Pt required CGA when OOB as pt had few instances of self corrected LOB when walking while pushing the IV pole. Pt was able to demo simple ADLs with SBA.   Barriers to return to prior living situation: weakness, balance  Recommendations for discharge: home with A from DTR  Rationale for recommendations: Anticipate pt will be able to complete ADLs safely with A from family prn at time of discharge.        Entered by: Vasu Castellanos 06/27/2019 3:56 PM

## 2019-06-27 NOTE — PROGRESS NOTES
06/27/19 1458   Quick Adds   Type of Visit Initial Occupational Therapy Evaluation   Living Environment   Lives With child(lashonda), adult  (lives with her DTR and DTR's )   Living Arrangements house   Home Accessibility stairs to enter home;stairs within home   Number of Stairs, Main Entrance 2   Number of Stairs, Within Home, Primary   (12)   Transportation Anticipated family or friend will provide   Living Environment Comment DTR is home to A prn   Self-Care   Usual Activity Tolerance good   Current Activity Tolerance good   Regular Exercise No   Equipment Currently Used at Home cane, straight   Activity/Exercise/Self-Care Comment has been in TCU for a week or two per pt   Functional Level   Ambulation 1-->assistive equipment  (cane when out of the house)   Transferring 0-->independent   Toileting 0-->independent   Bathing 0-->independent   Dressing 0-->independent   Eating 0-->independent   Communication 0-->understands/communicates without difficulty   Swallowing 0-->swallows foods/liquids without difficulty   Cognition 0 - no cognition issues reported   Fall history within last six months no   Which of the above functional risks had a recent onset or change? none   General Information   Onset of Illness/Injury or Date of Surgery - Date 06/26/19   Referring Physician Mary Troy PA-C   Patient/Family Goals Statement to get home and be Ind   Additional Occupational Profile Info/Pertinent History of Current Problem 74 year old female with history of HTN, ocular syphilis (dx 6/2019), retinal detachment, latent TB (currently on therapy), CKD III and breast cancer s/p masectomy who was admitted to [facility] internal medicine 6/26/19 with melena and hgb 6.9   Precautions/Limitations fall precautions   Cognitive Status Examination   Orientation orientation to person, place and time   Level of Consciousness alert   Visual Perception   Visual Perception Wears glasses;No deficits were identified   Visual  Perception Comments pt has baseline deficits, but no acute changes   Sensory Examination   Sensory Quick Adds No deficits were identified   Pain Assessment   Patient Currently in Pain No   Integumentary/Edema   Integumentary/Edema no deficits were identifed   Range of Motion (ROM)   ROM Comment shoulder flexion to 90 deg   Strength   Strength Comments generalized weakness   Muscle Tone Assessment   Muscle Tone Comments WNL   Coordination   Upper Extremity Coordination No deficits were identified   Transfer Skill: Sit to Stand   Level of [place]: Sit/Stand stand-by assist   Lower Body Dressing   Level of [place]: Dress Lower Body stand-by assist   Toileting   Level of [place]: Toilet stand-by assist   Grooming   Level of [place]: Grooming stand-by assist   Instrumental Activities of Daily Living (IADL)   Previous Responsibilities   (Pt had A from family)   Activities of Daily Living Analysis   Impairments Contributing to Impaired Activities of Daily Living   (deconditioning)   General Therapy Interventions   Planned Therapy Interventions ADL retraining;home program guidelines;progressive activity/exercise   Clinical Impression   Criteria for Skilled Therapeutic Interventions Met yes, treatment indicated   OT Diagnosis Decreased ability to complete self-cares and fucntional mobility safely 2/2 to new limited vision   Influenced by the following impairments decreased ADL I and tolerance   Assessment of Occupational Performance 1-3 Performance Deficits   Identified Performance Deficits home mgmt, leisure, mobility   Clinical Decision Making (Complexity) Low complexity   Therapy Frequency 5x/week   Predicted Duration of Therapy Intervention (days/wks) 7/3/19   Anticipated Equipment Needs at Discharge   (TBD)   Anticipated Discharge Disposition Home with Assist   Risks and Benefits of Treatment have been explained. Yes   Patient, Family & other staff in agreement with plan of care Yes   Clinical  "Impression Comments Pt may benefit from skilled OT to help increase ADL I and tolerance   [facility] AM-PAC  \"6 Clicks\" Daily Activity Inpatient Short Form   1. Putting on and taking off regular lower body clothing? 3 - A Little   2. Bathing (including washing, rinsing, drying)? 3 - A Little   3. Toileting, which includes using toilet, bedpan or urinal? 4 - None   4. Putting on and taking off regular upper body clothing? 4 - None   5. Taking care of personal grooming such as brushing teeth? 4 - None   6. Eating meals? 4 - None   Daily Activity Raw Score (Score out of 24.Lower scores equate to lower levels of function) 22   Total Evaluation Time   Total Evaluation Time (Minutes) 10     "

## 2019-06-27 NOTE — PLAN OF CARE
Neuro: A&Ox4. Left pupil fixed, retina detached.   Cardiac: SR. VSS.   Respiratory: Sating 100 on RA.  GI/: low urine output, BM X1, small, black  Diet/appetite: NPO, bowel rest, meds ok.   Activity:  SBA, up to chair and in halls.  Pain: At acceptable level on current regimen. No pain  Skin: No new deficits noted.  LDA's: R PICC, protonix drip, LR at 100    Plan: Eye appt 2 pm 06/27

## 2019-06-27 NOTE — PROGRESS NOTES
"[facility], [place]   Internal Medicine Daily Note           Interval History/Events     Hand off taken from admitting team  Overnight events reviewed  Denies any abdominal pain  Having melena for last 4 days  No vomiting  No chest pain, or shortness of breath       Review of Systems        4 point ROS including Respiratory, CV, GI and , other than that noted above is negative      Medications   I have reviewed current medications  in the \"current medication\" section of Epic.  Relevant changes include:     Physical Exam   General:       Vital signs:    Blood pressure 115/49, pulse 87, temperature 98.4  F (36.9  C), temperature source Oral, resp. rate 16, weight 76.8 kg (169 lb 4.8 oz), SpO2 99 %, not currently breastfeeding.  Estimated body mass index is 29.99 kg/m  as calculated from the following:    Height as of 6/17/19: 1.6 m (5' 3\").    Weight as of this encounter: 76.8 kg (169 lb 4.8 oz).      Intake/Output Summary (Last 24 hours) at 6/27/2019 1338  Last data filed at 6/27/2019 0941  Gross per 24 hour   Intake 466.67 ml   Output 150 ml   Net 316.67 ml      HEENT: No icterus, no pallor  Cardiovascular: S1, S2 normal  Respiratory:  B/L CTA  GI/Abdomen: Soft, NT, BS+  Neurology: Alert, awake,and oriented. No tremors.   Extremities: No pretibial edema.   Skin:      Laboratory and Imaging Studies     I have reviewed  laboratory and imaging studies in the Epic. Pertinent findings are as below:    BMP  Recent Labs   Lab 06/27/19  0315 06/26/19  1622    136   POTASSIUM 4.0 4.7   CHLORIDE 110* 107   MAGGY 7.1* 7.5*   CO2 24 22   BUN 36* 40*   CR 0.94 1.05*   * 286*     CBC  Recent Labs   Lab 06/27/19  1137 06/27/19  0315 06/26/19  1622   WBC  --  13.8* 14.4*   RBC  --  2.56* 2.14*   HGB 7.2* 7.8* 6.9*   HCT  --  25.5* 22.9*   MCV  --  100 107*   MCH  --  30.5 32.2   MCHC  --  30.6* 30.1*   RDW  --  17.8* 14.6   PLT  --  207 271     INR  Recent Labs   Lab 06/27/19  0315   INR " 1.09     LFTs  Recent Labs   Lab 06/27/19  0315 06/26/19  1622   ALKPHOS 74 90   AST 8 14   ALT 20 27   BILITOTAL 1.0 0.6   PROTTOTAL 4.3* 5.3*   ALBUMIN 1.8* 2.0*      PANCNo lab results found in last 7 days.        Impression/Plan        74 year old female with history of HTN, ocular syphilis (dx 6/2019), retinal detachment, latent TB (currently on therapy), CKD III and breast cancer s/p masectomy who was admitted to [facility] internal medicine 6/26/19 with melena and hgb 6.9     # Acute blood loss anemia, GI bleed  # Possible small bowel diverticulitis, and perforation on CT Abdomen 06/27: PTA 4 days melena, dizziness, diffuse abdominal discomfort. Found to have hgb 6.3 in clinic and stent to ED. Hgb 6.9 in ED, treated with 1U RBC and IV PPI. WBC 14.4. Trop x1 negative. Denies etoh use. Etiology concerning GIB 2/2 recent prednisone rx and NSAID use vs colitis (given abdominal pain, WBC elevation) vs ischemia vs perforation vs AVM vs other. VSS   Case discussed with GI, Surgery, and Ophthalmology on 06/27 Ophthalmology would like to continue on Prednisone and taper when she sees Ophthalmology on 06/27/2019  Okay to use if indicated on discussion with GI.   Hg 7.8 gm/dl.   - NPO, IV fluids  - PPI  - Start Zosyn for diverticulitis  - Telemetry  - Hold NSAIDs  - Serial Abdominal exam     # CKD III with elevated Cr: BL Cr around 0.9. Elevated to 1.05 in the setting of GIB and poor oral intake. 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 +T pallidum and +trepomena antibodies despite NR anti-treponema RPP. Plan was to treated with PCN with plans for 14 day course (through 6/21), however, appears as if TCU continued PCN through at least 6/25. Now s/p vitrectomy, lensectomy, etc  Discussed with Ophthalmology on 06/27.  Advised to continue following treatment.   - Stop PCN, treatment complete   - Continue Thornton Shield at all times   - Stop Indomethacin   - Continue Atropine,  difluprednate, maxitrol suspension  - Continue Prednisone  - Follow up appointment in clinic on 06/28.     # Latent TB: +Quant gold 6/6/19. CXR 6/6 no acute findings. Asymptomatic. Brother with h/o TB. LFts normal   - Continue Isoniazid      # HTN: PTA on metoprolol. BP stable on admission  - Hold BP meds given GIB        Diet: NPO, IVF as above  DVT Prophylaxis: Pneumatic Compression Devices  Cronin Catheter: not present  Code Status: DNR/DNI       Disposition Plan   Expected discharge in 1-2  days to prior living arrangement once Hg stable, Diverticulitis stable.     Entered: Jefferson Lopez 06/27/2019, 1:38 PM            Pt's care was discussed with bedside RN, patient and  during Care Team Rounds.               Jefferson Lopez MD  Hospitalist ( Internal medicine)  Pager: [phone]

## 2019-06-28 ENCOUNTER — OFFICE VISIT (OUTPATIENT)
Dept: OPHTHALMOLOGY | Facility: CLINIC | Age: 74
DRG: 378 | End: 2019-06-28
Attending: OPHTHALMOLOGY
Payer: MEDICARE

## 2019-06-28 DIAGNOSIS — A50.31: ICD-10-CM

## 2019-06-28 DIAGNOSIS — H44.112 PANUVEITIS OF LEFT EYE: Primary | ICD-10-CM

## 2019-06-28 LAB
ANION GAP SERPL CALCULATED.3IONS-SCNC: 5 MMOL/L (ref 3–14)
BASOPHILS # BLD AUTO: 0 10E9/L (ref 0–0.2)
BASOPHILS NFR BLD AUTO: 0.1 %
BUN SERPL-MCNC: 20 MG/DL (ref 7–30)
CALCIUM SERPL-MCNC: 7.5 MG/DL (ref 8.5–10.1)
CHLORIDE SERPL-SCNC: 111 MMOL/L (ref 94–109)
CO2 SERPL-SCNC: 25 MMOL/L (ref 20–32)
CREAT SERPL-MCNC: 0.85 MG/DL (ref 0.52–1.04)
DIFFERENTIAL METHOD BLD: ABNORMAL
EOSINOPHIL # BLD AUTO: 0 10E9/L (ref 0–0.7)
EOSINOPHIL NFR BLD AUTO: 0.1 %
ERYTHROCYTE [DISTWIDTH] IN BLOOD BY AUTOMATED COUNT: 18.9 % (ref 10–15)
GFR SERPL CREATININE-BSD FRML MDRD: 67 ML/MIN/{1.73_M2}
GLUCOSE SERPL-MCNC: 94 MG/DL (ref 70–99)
HCT VFR BLD AUTO: 24.3 % (ref 35–47)
HGB BLD-MCNC: 7 G/DL (ref 11.7–15.7)
HGB BLD-MCNC: 7 G/DL (ref 11.7–15.7)
HGB BLD-MCNC: 7.1 G/DL (ref 11.7–15.7)
HGB BLD-MCNC: 7.3 G/DL (ref 11.7–15.7)
HGB BLD-MCNC: 7.8 G/DL (ref 11.7–15.7)
IMM GRANULOCYTES # BLD: 0.1 10E9/L (ref 0–0.4)
IMM GRANULOCYTES NFR BLD: 0.6 %
LYMPHOCYTES # BLD AUTO: 0.7 10E9/L (ref 0.8–5.3)
LYMPHOCYTES NFR BLD AUTO: 5.6 %
MCH RBC QN AUTO: 30.9 PG (ref 26.5–33)
MCHC RBC AUTO-ENTMCNC: 30 G/DL (ref 31.5–36.5)
MCV RBC AUTO: 103 FL (ref 78–100)
MONOCYTES # BLD AUTO: 0.4 10E9/L (ref 0–1.3)
MONOCYTES NFR BLD AUTO: 3 %
NEUTROPHILS # BLD AUTO: 11.3 10E9/L (ref 1.6–8.3)
NEUTROPHILS NFR BLD AUTO: 90.6 %
NRBC # BLD AUTO: 0 10*3/UL
NRBC BLD AUTO-RTO: 0 /100
PLATELET # BLD AUTO: 230 10E9/L (ref 150–450)
POTASSIUM SERPL-SCNC: 4 MMOL/L (ref 3.4–5.3)
RBC # BLD AUTO: 2.36 10E12/L (ref 3.8–5.2)
SODIUM SERPL-SCNC: 140 MMOL/L (ref 133–144)
WBC # BLD AUTO: 12.5 10E9/L (ref 4–11)

## 2019-06-28 PROCEDURE — 25000131 ZZH RX MED GY IP 250 OP 636 PS 637: Mod: GY | Performed by: INTERNAL MEDICINE

## 2019-06-28 PROCEDURE — 92285 EXTERNAL OCULAR PHOTOGRAPHY: CPT | Mod: ZF | Performed by: OPHTHALMOLOGY

## 2019-06-28 PROCEDURE — 25800030 ZZH RX IP 258 OP 636: Performed by: PHYSICIAN ASSISTANT

## 2019-06-28 PROCEDURE — 99233 SBSQ HOSP IP/OBS HIGH 50: CPT | Performed by: INTERNAL MEDICINE

## 2019-06-28 PROCEDURE — 36592 COLLECT BLOOD FROM PICC: CPT | Performed by: INTERNAL MEDICINE

## 2019-06-28 PROCEDURE — 85018 HEMOGLOBIN: CPT | Performed by: INTERNAL MEDICINE

## 2019-06-28 PROCEDURE — 12000004 ZZH R&B IMCU UMMC

## 2019-06-28 PROCEDURE — 25000132 ZZH RX MED GY IP 250 OP 250 PS 637: Mod: GY | Performed by: PHYSICIAN ASSISTANT

## 2019-06-28 PROCEDURE — 40000802 ZZH SITE CHECK

## 2019-06-28 PROCEDURE — 92134 CPTRZ OPH DX IMG PST SGM RTA: CPT | Mod: ZF | Performed by: OPHTHALMOLOGY

## 2019-06-28 PROCEDURE — 25000128 H RX IP 250 OP 636: Performed by: INTERNAL MEDICINE

## 2019-06-28 PROCEDURE — 92133 CPTRZD OPH DX IMG PST SGM ON: CPT | Mod: ZF | Performed by: OPHTHALMOLOGY

## 2019-06-28 PROCEDURE — 80048 BASIC METABOLIC PNL TOTAL CA: CPT | Performed by: INTERNAL MEDICINE

## 2019-06-28 PROCEDURE — 85025 COMPLETE CBC W/AUTO DIFF WBC: CPT | Performed by: INTERNAL MEDICINE

## 2019-06-28 PROCEDURE — C9113 INJ PANTOPRAZOLE SODIUM, VIA: HCPCS | Performed by: PHYSICIAN ASSISTANT

## 2019-06-28 PROCEDURE — 85018 HEMOGLOBIN: CPT | Performed by: PHYSICIAN ASSISTANT

## 2019-06-28 PROCEDURE — 99207 ZZC CDG-MDM COMPONENT: MEETS MODERATE - UP CODED: CPT | Performed by: INTERNAL MEDICINE

## 2019-06-28 PROCEDURE — 36592 COLLECT BLOOD FROM PICC: CPT | Performed by: PHYSICIAN ASSISTANT

## 2019-06-28 PROCEDURE — 25000128 H RX IP 250 OP 636: Performed by: PHYSICIAN ASSISTANT

## 2019-06-28 PROCEDURE — G0463 HOSPITAL OUTPT CLINIC VISIT: HCPCS | Mod: ZF

## 2019-06-28 RX ADMIN — PIPERACILLIN AND TAZOBACTAM 3.38 G: 3; .375 INJECTION, POWDER, FOR SOLUTION INTRAVENOUS at 03:15

## 2019-06-28 RX ADMIN — PREDNISOLONE ACETATE 1 DROP: 10 SUSPENSION/ DROPS OPHTHALMIC at 13:17

## 2019-06-28 RX ADMIN — OYSTER SHELL CALCIUM WITH VITAMIN D 1 TABLET: 500; 200 TABLET, FILM COATED ORAL at 08:08

## 2019-06-28 RX ADMIN — PREDNISOLONE ACETATE 1 DROP: 10 SUSPENSION/ DROPS OPHTHALMIC at 20:51

## 2019-06-28 RX ADMIN — NEOMYCIN SULFATE, POLYMYXIN B SULFATE AND DEXAMETHASONE 1 DROP: 3.5; 10000; 1 SUSPENSION OPHTHALMIC at 08:07

## 2019-06-28 RX ADMIN — NEOMYCIN SULFATE, POLYMYXIN B SULFATE AND DEXAMETHASONE 1 DROP: 3.5; 10000; 1 SUSPENSION OPHTHALMIC at 20:51

## 2019-06-28 RX ADMIN — SODIUM CHLORIDE, POTASSIUM CHLORIDE, SODIUM LACTATE AND CALCIUM CHLORIDE: 600; 310; 30; 20 INJECTION, SOLUTION INTRAVENOUS at 02:43

## 2019-06-28 RX ADMIN — PREDNISONE 60 MG: 10 TABLET ORAL at 08:08

## 2019-06-28 RX ADMIN — PIPERACILLIN AND TAZOBACTAM 3.38 G: 3; .375 INJECTION, POWDER, FOR SOLUTION INTRAVENOUS at 23:51

## 2019-06-28 RX ADMIN — SODIUM CHLORIDE 8 MG/HR: 9 INJECTION, SOLUTION INTRAVENOUS at 09:30

## 2019-06-28 RX ADMIN — SODIUM CHLORIDE 8 MG/HR: 9 INJECTION, SOLUTION INTRAVENOUS at 23:51

## 2019-06-28 RX ADMIN — NEOMYCIN SULFATE, POLYMYXIN B SULFATE, AND DEXAMETHASONE 0.5 G: 3.5; 10000; 1 OINTMENT OPHTHALMIC at 20:51

## 2019-06-28 RX ADMIN — Medication 25 MG: at 08:08

## 2019-06-28 RX ADMIN — OXYCODONE HYDROCHLORIDE AND ACETAMINOPHEN 500 MG: 500 TABLET ORAL at 08:08

## 2019-06-28 RX ADMIN — OYSTER SHELL CALCIUM WITH VITAMIN D 1 TABLET: 500; 200 TABLET, FILM COATED ORAL at 20:51

## 2019-06-28 RX ADMIN — SODIUM CHLORIDE, POTASSIUM CHLORIDE, SODIUM LACTATE AND CALCIUM CHLORIDE: 600; 310; 30; 20 INJECTION, SOLUTION INTRAVENOUS at 18:30

## 2019-06-28 RX ADMIN — PIPERACILLIN AND TAZOBACTAM 3.38 G: 3; .375 INJECTION, POWDER, FOR SOLUTION INTRAVENOUS at 17:56

## 2019-06-28 RX ADMIN — ISONIAZID 300 MG: 300 TABLET ORAL at 08:08

## 2019-06-28 RX ADMIN — NEOMYCIN SULFATE, POLYMYXIN B SULFATE AND DEXAMETHASONE 1 DROP: 3.5; 10000; 1 SUSPENSION OPHTHALMIC at 13:16

## 2019-06-28 RX ADMIN — PREDNISOLONE ACETATE 1 DROP: 10 SUSPENSION/ DROPS OPHTHALMIC at 17:53

## 2019-06-28 RX ADMIN — ATROPINE SULFATE 1 DROP: 10 SOLUTION/ DROPS OPHTHALMIC at 08:06

## 2019-06-28 RX ADMIN — NEOMYCIN SULFATE, POLYMYXIN B SULFATE AND DEXAMETHASONE 1 DROP: 3.5; 10000; 1 SUSPENSION OPHTHALMIC at 17:54

## 2019-06-28 RX ADMIN — PREDNISOLONE ACETATE 1 DROP: 10 SUSPENSION/ DROPS OPHTHALMIC at 08:07

## 2019-06-28 RX ADMIN — PIPERACILLIN AND TAZOBACTAM 3.38 G: 3; .375 INJECTION, POWDER, FOR SOLUTION INTRAVENOUS at 08:08

## 2019-06-28 ASSESSMENT — TONOMETRY
IOP_METHOD: TONOPEN
OD_IOP_MMHG: 17
OS_IOP_MMHG: 13

## 2019-06-28 ASSESSMENT — VISUAL ACUITY
METHOD: SNELLEN - LINEAR
OS_SC: CF @ 3'
OD_SC: 20/200
OD_PH_SC: 20/80
OS_PH_SC: 20/250

## 2019-06-28 ASSESSMENT — CONF VISUAL FIELD
OD_INFERIOR_NASAL_RESTRICTION: 3
OS_INFERIOR_TEMPORAL_RESTRICTION: 3
OS_INFERIOR_NASAL_RESTRICTION: 3
OD_SUPERIOR_TEMPORAL_RESTRICTION: 3
OS_SUPERIOR_TEMPORAL_RESTRICTION: 3
OS_SUPERIOR_NASAL_RESTRICTION: 3
OD_INFERIOR_TEMPORAL_RESTRICTION: 3

## 2019-06-28 ASSESSMENT — ACTIVITIES OF DAILY LIVING (ADL)
ADLS_ACUITY_SCORE: 14
ADLS_ACUITY_SCORE: 16
ADLS_ACUITY_SCORE: 14
ADLS_ACUITY_SCORE: 14

## 2019-06-28 ASSESSMENT — CUP TO DISC RATIO
OD_RATIO: 0.5
OS_RATIO: 0.5

## 2019-06-28 NOTE — PROGRESS NOTES
Surgery Progress Note  06/28/2019       Subjective:  - EMILIA overnight.  - passing gas  - patient doing well     Objective:  Temp:  [98  F (36.7  C)-98.8  F (37.1  C)] 98.8  F (37.1  C)  Pulse:  [68-78] 78  Heart Rate:  [73-91] 84  Resp:  [16-20] 18  BP: (115-147)/(49-68) 141/58  SpO2:  [96 %-99 %] 99 %    I/O last 3 completed shifts:  In: 2106.01 [P.O.:175; I.V.:1931.01]  Out: 450 [Urine:450]      Gen: Awake, alert, NAD  Resp: NLB on RA  Abd: soft, nondistended, nontender  Ext: WWP, no edema     Labs:  Recent Labs   Lab 06/27/19  2236 06/27/19  1851 06/27/19  1137 06/27/19  0315 06/26/19  1622   WBC  --   --   --  13.8* 14.4*   HGB 7.3* 7.2* 7.2* 7.8* 6.9*   PLT  --   --   --  207 271       Recent Labs   Lab 06/27/19  0315 06/26/19  1622    136   POTASSIUM 4.0 4.7   CHLORIDE 110* 107   CO2 24 22   BUN 36* 40*   CR 0.94 1.05*   * 286*   MAGGY 7.1* 7.5*       Imaging:  CT AP 6/27  IMPRESSION:   1. Findings consistent with small bowel diverticulitis in a segment of  mid ileum. There is a focus of air adjacent to this area of bowel  measuring 1.9 cm without definable wall. This may be a contained  perforation versus larger diverticulum. No abscess.     Assessment/Plan:   74 year old female admitted to [facility] internal medicine 6/26/19 with melena, hgb 6.9, WBC 14.4 and new concern for small bowel perforation. no abdominal pain. contained perforation is inconclusive but there is definite small bowel diverticulitis. No surgical intervention at this time.    - Advance diet per GI.  - Consider low residue diet.  - Continue 14 days of antibiotics.  - Surgery will continue to follow.    Seen, examined, and discussed with chief resident, who will discuss with staff.  - - - - - - - - - - - - - - - - - -  Jennifer Ragland MD  General Surgery PGY-1  Pager [phone]

## 2019-06-28 NOTE — PROGRESS NOTES
"[facility], [place]   Internal Medicine Daily Note           Interval History/Events     Hand off taken from admitting team  Overnight events reviewed  Denies any abdominal pain  Having melena for last 4 days  No vomiting  No chest pain, or shortness of breath       Review of Systems        4 point ROS including Respiratory, CV, GI and , other than that noted above is negative      Medications   I have reviewed current medications  in the \"current medication\" section of Epic.  Relevant changes include:     Physical Exam   General:       Vital signs:    Blood pressure 133/56, pulse 78, temperature 98.4  F (36.9  C), temperature source Oral, resp. rate 18, weight 77 kg (169 lb 12.1 oz), SpO2 98 %, not currently breastfeeding.  Estimated body mass index is 30.07 kg/m  as calculated from the following:    Height as of 6/17/19: 1.6 m (5' 3\").    Weight as of this encounter: 77 kg (169 lb 12.1 oz).      Intake/Output Summary (Last 24 hours) at 6/27/2019 1338  Last data filed at 6/27/2019 0941  Gross per 24 hour   Intake 466.67 ml   Output 150 ml   Net 316.67 ml      HEENT: No icterus, no pallor  Cardiovascular: S1, S2 normal  Respiratory:  B/L CTA  GI/Abdomen: Soft, NT, BS+  Neurology: Alert, awake,and oriented. No tremors.   Extremities: No pretibial edema.   Skin:      Laboratory and Imaging Studies     I have reviewed  laboratory and imaging studies in the Epic. Pertinent findings are as below:    BMP  Recent Labs   Lab 06/28/19  0920 06/27/19  0315 06/26/19  1622    142 136   POTASSIUM 4.0 4.0 4.7   CHLORIDE 111* 110* 107   MAGGY 7.5* 7.1* 7.5*   CO2 25 24 22   BUN 20 36* 40*   CR 0.85 0.94 1.05*   GLC 94 109* 286*     CBC  Recent Labs   Lab 06/28/19  1156 06/28/19  0920  06/27/19  0315 06/26/19  1622   WBC  --  12.5*  --  13.8* 14.4*   RBC  --  2.36*  --  2.56* 2.14*   HGB 7.1* 7.3*   < > 7.8* 6.9*   HCT  --  24.3*  --  25.5* 22.9*   MCV  --  103*  --  100 107*   MCH  --  30.9  --  " 30.5 32.2   MCHC  --  30.0*  --  30.6* 30.1*   RDW  --  18.9*  --  17.8* 14.6   PLT  --  230  --  207 271    < > = values in this interval not displayed.     INR  Recent Labs   Lab 06/27/19 0315   INR 1.09     LFTs  Recent Labs   Lab 06/27/19 0315 06/26/19  1622   ALKPHOS 74 90   AST 8 14   ALT 20 27   BILITOTAL 1.0 0.6   PROTTOTAL 4.3* 5.3*   ALBUMIN 1.8* 2.0*      PANCNo lab results found in last 7 days.        Impression/Plan        74 year old female with history of HTN, ocular syphilis (dx 6/2019), retinal detachment, latent TB (currently on therapy), CKD III and breast cancer s/p masectomy who was admitted to [facility] internal medicine 6/26/19 with melena and hgb 6.9     # Acute blood loss anemia, GI bleed  # Possible small bowel diverticulitis, and perforation on CT Abdomen 06/27: PTA 4 days melena, dizziness, diffuse abdominal discomfort. Found to have hgb 6.3 in clinic and stent to ED. Hgb 6.9 in ED, treated with 1U RBC and IV PPI. WBC 14.4. Trop x1 negative. Denies etoh use. Etiology concerning GIB 2/2 recent prednisone rx and NSAID use.   Case discussed with GI, Surgery, and Ophthalmology on 06/27 Ophthalmology would like to continue on Prednisone and taper when she sees Ophthalmology on 06/28/2019  Okay to use if indicated on discussion with GI.   Hg 7.3  Gm/dl on 06/28  - Clear liquid diet, and advance as tolerated  - PPI drip for 72 hours  - Start Zosyn for diverticulitis  - Telemetry  - Hold NSAIDs  - Serial Abdominal exam  - H Pylori antigen  - Hg every six hours for today, then reduce frequency as stable.      # CKD III with elevated Cr: BL Cr around 0.9. Elevated to 1.05 in the setting of GIB and poor oral intake. 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 +T pallidum and +trepomena antibodies  despite NR anti-treponema RPP. Plan was to treated with PCN with plans for 14 day course (through 6/21), however, appears as if TCU continued PCN through at least 6/25. Now s/p vitrectomy, lensectomy, etc  Discussed with Ophthalmology on 06/27. Advised to continue following treatment.   - Stop PCN, treatment complete   - Continue Thornton Shield at all times   - Stop Indomethacin   - Continue Atropine,  difluprednate, maxitrol suspension  - Continue Prednisone  - Follow up appointment in clinic on 06/28.     # Latent TB: +Quant gold 6/6/19. CXR 6/6 no acute findings. Asymptomatic. Brother with h/o TB. LFts normal   - Continue Isoniazid      # HTN: PTA on metoprolol. BP stable on admission  - Hold BP meds given GIB        Diet: Clear liquid diet. IV fluid  DVT Prophylaxis: Pneumatic Compression Devices  Cronin Catheter: not present  Code Status: DNR/DNI       Disposition Plan   Expected discharge in 3-4 days to prior living arrangement once Hg stable, Diverticulitis stable.     Entered: Jefferson Lopez 06/28/2019, 2:45 PM            Pt's care was discussed with bedside RN, patient and  during Care Team Rounds.               Jefferson Lopez MD  Hospitalist ( Internal medicine)  Pager: [phone]

## 2019-06-28 NOTE — PROGRESS NOTES
Social Work Services Progress Note    Hospital Day: 2  Date of Initial Social Work Evaluation:  Not yet completed  Collaborated with:  Patient, Pt's dtr (Brook), Chart Review    Data:  Pt is 75 y/o female admitted to [facility] on 6/26/19 for melena and hgb 6.9. Pt has history of HTN, ocular syphilis (dx 6/2019), retinal detachment, latent TB (currently on therapy), CKD III and breast cancer s/p masectomy.     Pt was admitted from [facility] TCU where she had been discharged to on 6/11/19 for IV abx. SW involved for discharge planning.     Intervention:  SW completed chart review and OT recommending home w/assist from dtr. SW met with Pt and dtr (Brook) at bedside today to discuss discharge planning. Brook indicates that they did hold the bed at [facility], however, she said that the IV abx she had gone there for are supposed to end today and therefore they are planning to have Pt discharge to home with dtr directly from [facility].     DOMINIQUE updated [facility] ([facility]) who will follow for home discharge needs.     Assessment:  Pt and family planning for discharge home when stable for discharge.    Plan:    Anticipated Disposition:  Home    Barriers to d/c plan:  Medical stability    Follow Up:  No SW f/u indicated; [facility] to follow for home discharge needs.    JASON Simpson, XAVIERSW  6[facility] Intermediate Care Unit   Phone: [phone]  Pager: [phone]

## 2019-06-28 NOTE — CONSULTS
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 GI consulted to assist in further management.  Currently HDS. Hb improved to 7.8 with 1U PRBC.  Interestingly, CT abd/pel done for abdominal pain is showed small bowel diverticulitis with possible perforation since there was a moderate sized central focus of air adjacent to the bowel with no definable wall. Patient's has minimal pain on palpation, otherwise benign exam. Mild leukocytosis on labs.    Differentials for melena are likely UGIB in setting of recent NSAID and high dose steroids use leading to PUD vs esophagitis/gastritis vs AVM vs Dieulofuoy vs esophageal/gastric mass (less likely). Small bowel diverticulitis can also possible be a cause of patient's GI bleeding (could be NSAID induced ulceration in that area of the small bowel)    Ideally would started with an upper endoscopy but patient is currently stable and there is concern for complicated small bowel diverticulitis and perforation which will worsen with CO2/air insufflation during endoscopy.      RECOMMENDATIONS:  - Continue IV pantoprazole infusion for a total of 72 hours and then switched to twice daily PPI for at least 8 weeks.  - Please check an H Pylori stool AG.   - IV antimicrobials for complicated small bowel diverticulitis  - Agree with general surgery consultation.  - Diet clear liquids for today and advance as tolerated from tomorrow.  - No plans for an endoscopy at this time. Will only pursue this if the patient has signs of clinically  significant bleeding such as 2gm/dl drop in 24 hours or signs of shock related to her bleeding.   - Continue to trend H&h Q12 H. Transfuse for Hb > 7.   - Avoid any NSAIDs at all cost. Can consider acetaminophen for pain control.    Gastroenterology team will continue to follow with you. Thank you for involving us in this patient's care. Please do not hesitate to contact the GI service with any questions or concerns.   Patient care plan has been discussed with Dr. Harding, GI staff physician.    Brittani Bowen  Gastroenterology Fellow  P [phone]  -------------------------------------------------------------------------------------------------------------------          Chief Complaint:   We were asked by Dr. Mackenzie Lopez of medicine [facility] to evaluate this patient with melena.  History is obtained from the patient and the medical record.          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 GI consulted to assist in further management.    As per the electronic chart and the patient, with the management of her ocular syphilitis, she was doing relatively well till about 4 days ago when she started noticing dark bad stools 1-2 times/daily. This was associated with fatigue and easy tiredness. Her daughter may have noticed some confusion as well but no issues since arrival. She denies any hematemesis/melena. HAs loose stool at baseline which is unchanged. No hematochezia. She was on significant dose of steroids and NSAIDs for her ocular syphilitis. On arrival, she was HDS. Hb improved to 7.8 with 1U PRBC.  Interestingly, CT abd/pel done  for abdominal pain is showed small bowel diverticulitis with possible perforation since there was a moderate sized central focus of air adjacent to the bowel with no definable wall. Patient's has minimal pain on palpation, otherwise benign exam. Mild leukocytosis on labs.            Past Medical History:   Reviewed and edited as appropriate  Past Medical History:   Diagnosis Date     Allergic rhinitis      Arthropathy      Breast cancer (H) 2012     Glaucoma      HTN, goal below 140/90      Intestinal malabsorption      Scleritis             Past Surgical History:   Reviewed and edited as appropriate   Past Surgical History:   Procedure Laterality Date     APPENDECTOMY       ARTHROSCOPY KNEE       C BREAST RECONSTRUCT W TRAM 1 PEDICL       C TOTAL KNEE ARTHROPLASTY  2008    Bilateral     CHOLECYSTECTOMY       HYSTERECTOMY TOTAL ABDOMINAL      Benign      MASTECTOMY  2012     VITRECTOMY PARSPLANA WITH 25 GAUGE SYSTEM Left 6/17/2019    Procedure: LEFT EYE 25 GAUGE VITRECTOMY, PARS PLANA LENSECTOMY, MEMBRANE PEEL,  ENDOLASER, POSTERIOR SYNECHIOLYSIS, IRIDOTOMY, AIR FLUID EXCHANGE, INFUSION SILICONE OIL;  Surgeon: Carolee Garza MD;  Location: [facility]            Previous Endoscopy:   No results found for this or any previous visit.         Social History:   Reviewed and edited as appropriate  Social History     Socioeconomic History     Marital status: Single     Spouse name: Not on file     Number of children: Not on file     Years of education: Not on file     Highest education level: Not on file   Occupational History     Not on file   Social Needs     Financial resource strain: Not on file     Food insecurity:     Worry: Not on file     Inability: Not on file     Transportation needs:     Medical: Not on file     Non-medical: Not on file   Tobacco Use     Smoking status: Former Smoker     Smokeless tobacco: Never Used   Substance and Sexual Activity     Alcohol use: No     Drug use: No     Sexual activity:  Never   Lifestyle     Physical activity:     Days per week: Not on file     Minutes per session: Not on file     Stress: Not on file   Relationships     Social connections:     Talks on phone: Not on file     Gets together: Not on file     Attends [employer] service: Not on file     Active member of club or organization: Not on file     Attends meetings of clubs or organizations: Not on file     Relationship status: Not on file     Intimate partner violence:     Fear of current or ex partner: Not on file     Emotionally abused: Not on file     Physically abused: Not on file     Forced sexual activity: Not on file   Other Topics Concern     Parent/sibling w/ CABG, MI or angioplasty before 65F 55M? Not Asked   Social History Narrative     Not on file            Family History:   Reviewed and edited as appropriate  No known history of gastrointestinal/liver disease or  gastrointestinal malignancies       Allergies:   Reviewed and edited as appropriate     Allergies   Allergen Reactions     Cephalexin      Other reaction(s): Sedation     Codeine Sulfate      Diatrizoate Anaphylaxis     Ibuprofen Sodium      Iodamide      Ivp Dye [Contrast Dye]      Lactose Anaphylaxis     No Clinical Screening - See Comments Anaphylaxis     flu shot     Sulfamethoxazole-Trimethoprim Hives     Other reaction(s): Sedation     Bactrim [Sulfamethoxazole W/Trimethoprim] Hives     Codeine      Other reaction(s): GI Bleeding     Influenza Virus Vaccine H5n1      Iodine      Other reaction(s): Agitation     Sulfa Drugs Hives     Thimerosal      Other reaction(s): GI Upset     Tramadol Hives     hives            Medications:     Current Facility-Administered Medications   Medication     atropine 1 % ophthalmic solution 1 drop     calcium carbonate-vitamin D (OSCAL w/D) per tablet 1 tablet     isoniazid (NYDRAZID) tablet 300 mg     lactated ringers infusion     lidocaine (LMX4) cream     lidocaine 1 % 0.1-1 mL     melatonin tablet 1 mg      naloxone (NARCAN) injection 0.1-0.4 mg     neomycin-polymyxin-dexamethasone (MAXITROL) ophthalmic ointment 0.5 g     neomycin-polymyxin-dexamethasone (MAXITROL) ophthalmic susp 1 drop     ondansetron (ZOFRAN-ODT) ODT tab 4 mg    Or     ondansetron (ZOFRAN) injection 4 mg     pantoprazole (PROTONIX) 80 mg in sodium chloride 0.9 % 100 mL infusion     piperacillin-tazobactam (ZOSYN) 3.375 g vial to attach to  mL bag     polyethylene glycol (MIRALAX/GLYCOLAX) Packet 17 g     prednisoLONE acetate (PRED FORTE) 1 % ophthalmic susp 1 drop     predniSONE (DELTASONE) tablet 60 mg     sodium chloride (PF) 0.9% PF flush 3 mL     sodium chloride (PF) 0.9% PF flush 3 mL     vitamin B6 (pyridOXINE) tablet 25 mg     vitamin C (ASCORBIC ACID) tablet 500 mg             Review of Systems:   A complete review of systems was performed and is negative except as noted in the HPI           Physical Exam:   /68 (BP Location: Right arm)   Pulse 87   Temp 98.5  F (36.9  C) (Oral)   Resp 18   Wt 76.8 kg (169 lb 4.8 oz)   SpO2 96%   BMI 29.99 kg/m    Wt:   Wt Readings from Last 2 Encounters:   06/27/19 76.8 kg (169 lb 4.8 oz)   06/17/19 77.1 kg (170 lb)      Constitutional: cooperative, pleasant, wearing large black shades  Eyes: Sclera anicteric/injected  Ears/nose/mouth/throat: Normal oropharynx without ulcers or exudate, mucus membranes moist, hearing intact  Neck: supple, thyroid normal size  CV: No edema  Respiratory: Unlabored breathing  Lymph: No axillary, submandibular, supraclavicular or inguinal lymphadenopathy  Abd: Nondistended, +bs, no hepatosplenomegaly, TTP in mid epigastric re, no peritoneal signs  Skin: warm, perfused, no jaundice  Neuro: AAO x 3, No asterixis  Psych: Normal affect  MSK: No gross deformities         Data:   Labs and imaging below were independently reviewed and interpreted    BMP  Recent Labs   Lab 06/27/19  0315 06/26/19  1622    136   POTASSIUM 4.0 4.7   CHLORIDE 110* 107   MAGGY 7.1*  7.5*   CO2 24 22   BUN 36* 40*   CR 0.94 1.05*   * 286*     CBC  Recent Labs   Lab 06/27/19  1851  06/27/19  0315 06/26/19  1622   WBC  --   --  13.8* 14.4*   RBC  --   --  2.56* 2.14*   HGB 7.2*   < > 7.8* 6.9*   HCT  --   --  25.5* 22.9*   MCV  --   --  100 107*   MCH  --   --  30.5 32.2   MCHC  --   --  30.6* 30.1*   RDW  --   --  17.8* 14.6   PLT  --   --  207 271    < > = values in this interval not displayed.     INR  Recent Labs   Lab 06/27/19 0315   INR 1.09     LFTs  Recent Labs   Lab 06/27/19  0315 06/26/19  1622   ALKPHOS 74 90   AST 8 14   ALT 20 27   BILITOTAL 1.0 0.6   PROTTOTAL 4.3* 5.3*   ALBUMIN 1.8* 2.0*      PANCNo lab results found in last 7 days.    IMAGING:    CT abdomen/pelvis 6/26/19:  Liver: Normal parenchymal attenuation without focal mass.  Biliary system: Gallbladder is nonvisualized.. No intrahepatic or  extrahepatic biliary ductal dilatation.  Pancreas: No focal mass or dilation of the main pancreatic duct. The  pancreas is atrophic.  Stomach: Within normal limits.  Spleen: Within normal limits.  Adrenal glands: Within normal limits.  Kidneys: No focal mass, hydronephrosis, or stone.  Bladder: Within normal limits.  Reproductive organs: Postoperative changes of hysterectomy with  calcification along the right aspect of the vaginal cuff.  Colon: Multiple colonic diverticuli without adjacent fat stranding.  Appendix: Not visualized  Small Bowel: Multiple small bowel diverticuli with inflamed  diverticulum in the mid abdomen in a segment of mid ileum with  adjacent mesenteric stranding is demonstrated on series 3, image 34  and series 5, image 279 through 312. There is a moderate-sized central  focus of air adjacent to the bowel measuring 1.5 x 1.9 cm without  definable wall. No free fluid. Mild central mesenteric stranding  throughout the remainder of the central mesentery.  Lymph nodes: No intra-abdominal or pelvic lymphadenopathy.  Vasculature: Within normal limits.     Lung  bases: Mild atelectatic changes. Mild coronary calcification.     Bones and soft tissues: No suspicious soft tissue mass or fluid  collection. No suspicious osseous lesion. Fat filled ventral hernias.  Atrophy of the rectus abdominis muscles. Postoperative changes of the  left breast. The bones are osteopenic.                                                                      IMPRESSION:   1. Findings consistent with small bowel diverticulitis in a segment of  mid ileum. There is a focus of air adjacent to this area of bowel  measuring 1.9 cm without definable wall. This may be a contained  perforation versus larger diverticulum. No abscess.

## 2019-06-28 NOTE — PATIENT INSTRUCTIONS
Oral prednisone: decrease to 40 mg/day  Continue atropine daily left eye  Continue maxitrol ointment at night left eye  Continue maxitrol drops four times a day left eye  Decrease prednisolone drop to twice a day left eye

## 2019-06-28 NOTE — PROGRESS NOTES
"Cross cover note    Pt sleeping comfortably. Easily aroused. While awake interactive. +Flatus    Vital signs:  Temp: 98.4  F (36.9  C) Temp src: Oral BP: 127/50 Pulse: 68 Heart Rate: 84 Resp: 18 SpO2: 98 % O2 Device: None (Room air)     Weight: 76.8 kg (169 lb 4.8 oz)  Estimated body mass index is 29.99 kg/m  as calculated from the following:    Height as of 6/17/19: 1.6 m (5' 3\").    Weight as of this encounter: 76.8 kg (169 lb 4.8 oz).    Appears comfortable.   NLB on RA  Abdomen soft, nontender, no guarding or rebound tenderness. Mildly tympanic.    Mary Carlos is a 74 year old woman with history of diverticulosis involving the small bowel here with diverticulitis and CT concerning for possible small bowel perforation, however her clinical picture is reassuring.      - Serial abdominal exams   - Monitor vitals       Jessica Jordan MD  Surgery PGY-1          "

## 2019-06-28 NOTE — PLAN OF CARE
Neuro: A&Ox4.   Cardiac: SR. VSS.   Respiratory: Sating 98% on RA.  GI/: Adequate urine out put. No BM at this shift. No sign or symptom of bleeding noted.   Diet/appetite: NPO  Activity:  Assist of  one up to chair and in halls. She went to eye Dr appointment , tolerated well.   Pain: At acceptable level on current regimen.   Skin: No new deficits noted.  LDA's: PICC patent and functioning.   Plan: Continue with POC. Notify primary team with changes.

## 2019-06-28 NOTE — PLAN OF CARE
PT / 6B - PT orders received and acknowledged. Per discussion with OT - pt may only require one therapy discipline to address rehab needs during IP stay. OT will continue to follow and screen for acute PT needs, PT to hold evaluation and initiate as indicated.

## 2019-06-28 NOTE — PLAN OF CARE
[id number]    /58 (BP Location: Right arm)   Pulse 78   Temp 98.8  F (37.1  C) (Oral)   Resp 18   Wt 77 kg (169 lb 12.1 oz)   SpO2 99%   BMI 30.07 kg/m      Neuro: A&Ox4. Pleasant  Cardiac: SR. VSS.       Respiratory: On RA. LS clear, Denies SOB  GI/: Adequate urine output. No BM since 6/26.   Diet/appetite: Denies nausea, Tolerating clears  Activity:  SBA, up to chair and in halls.  Pain: Denies pain  Skin: Intact  Lines: PICC infusing LR and Protonix drip. Zosyn q 6hr.      R: Continue with POC. Notify primary team with changes.

## 2019-06-28 NOTE — Clinical Note
I recommend decreasing her oral prednisone to 40 mg/day and decreasing her prednisolone drops to twice a day left eye. Thank you

## 2019-06-28 NOTE — PROGRESS NOTES
HPI: Mary Carlos is a 74 year old female referred by Dr. 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 Prior to evaluation at [facility] eye clinic 6.6.19, she had been followed by Dr. Conteh for about 2 years with waxing and waning left > right eye redness and pain. She had been diagnosed with scleritis both eyes and was treated with topical CsA, topical prednisolone, and topical NSAIDS. She and her daughter think that her right eye had been blurry for about 6 months. Her corneal changes both eyes initially were presumed secondary to ocular rosacea but in March 2019 additional vascular ingrowth was noted, so patient was referred to Dr. Almodovar (rheum) for scleritis workup. Dr. Almodovar referred her to [facility] eye clinic for additional scleritis evaluation, since in May 2019 vision left eye (previously her better seeing eye) was markedly decreased.   On 6.6.19 exam her left eye was noted to have scleral injection, AC cell/flare, posterior synechiae, and vitritis on B-Scan. Both eyes were noted to have peripheral corneal haze with KNV. Work-up revealed positive Treponemal antibodies (+ x2), + Quant-Tb with negative chest x-ray, as well as additional elevated inflammatory markers. She was treated for IV penicillin for presumed ocular syphilis. 6.13.19 B-scan left eye was concerning for retinal detachment so she underwent pars plana vitrectomy with vitreous biopsy, lensectomy, and silicone oil placement on 6.17.19 with Dr. Garza.    Today she reports that her eyes are comfortable, although she is concerned about the left upper lid ptosis. The left eye pain present prior to surgery has resolved. Vision seems about the same since her last eye clinic visit about 1 week ago and is very blurry left eye.    Current ocular medications: oral prednisone 60 mg/day, maxitrol left eye four times a day, PredForts left eye four times a day, Maxitrol jia left eye at bedtime, Atropine left eye daily.  "  Prior ocular medications: oral indomethacin (stopped for GI bleed).    Ocular history:   1. 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   - March 2019 near vision was 20/100 right eye and 20/30 left eye, per outside notes   - S/p IV penicillin   2. 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  Intraop findings:   - subtotal retinal detachment with peripheral areas of ischemia   - underlying choroidals   - superior retinal tear   - anterior proliferative vitreoretinopathy  3. Patient reports a history of \"glaucoma surgery in both eyes\" 30-35 years ago in [place], [place] (further details unknown).  4. Cataract right eye  5. Pseudophakia left eye    Medical history:  1. Admitted 2 days ago for acute blood loss anemia related to melena, found to have Hgb 6.3 in clinic and admitted for evaluation and treatment, being managed conservatively.  2. HTN  3. CKD stage III  4. Breast cancer s/p mastectomy (?2012)  5. Cholecystectomy (prior to 1995)    Review of systems: Positive for hearing loss, easy bruising, recurrent diarrhea, and bloody stools. Negative for fatigue, weight loss, fevers, chills, night sweats, frequent headaches, seizure, fainting, numbness/tingling, weakness, oral ulcers, genital ulcers, recurrent nosebleeds, sinusitis, tinnitus, chronic cough, chest pain, shortness of breath, skin rash, tick bite,  easy bleeding, joint pain/stiffness, back pain, and bloody urine.    Social history: She is a former smoker (quit in 2011). Denies alcohol and IV drug use. 2 dogs and 1 cat for pets. She has never lived outside the [place].    Laboratory/Imaging Results:  6.17.19 left vitreous " biopsy: cultures negative to date, additional testing (unsure of details) pending  6.6.19 chest x-ray and RPR both normal/negative. Quantiferon-Tb positive (0.5-0.6). Treponema Ab and TP-PA both positive.  Outside rheum testing: +Anti-nuclear antibody 2010, +SSA and SSB, +HLAB27, +RF (IgA)    Ocular Imaging:  Macular OCT 6.28.19:  Right eye: Normal foveal contour, retinal layers intact, no intraretinal or subretinal fluid  Left eye: Outer retinal atrophy/attenuation, no intraretinal or subretinal fluid. Silicone oil/retina hyperreflective line  Irregular choroidal contour both eyes.    Retinal nerve fiber layer OCT 6.28.19:  Right eye: borderline TI otherwise normal  Left eye: unreliable tracing    Impression/Plan:  1. Scleritis and panuveitis left eye. Presumed syphilitic given positive syphilis testing (serum); now s/p IV PCN treatment. Differential diagnosis includes Tb, manifestation of systemic autoimmune disease, idiopathic. Quiescent today.   - Agree with remaining off oral NSAIDs given GI bleed   - Decrease oral prednisone to 40 mg/day. If eye remains quiescent at her next visit with Dr. Garza, may decrease oral prednisone to 20 mg/day   - Decrease PredForte to twice a day left eye   - Scleral/slit lamp photos today to document baseline with quiescence  2. 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 Likely related to scleritis, will watch for PUK-like picture.  3. 1.5 weeks s/p vitreous biopsy/lensectomy/RD repair with silicone oil placement left eye.   - Continue post-op eye drops per Dr. Garza (atropine daily left eye, maxitrol ointment at night left eye, maxitrol drops four times a day left eye)   - Final vitreous testing pending   - Reschedule missed post-op visit in 1-2 weeks  4. Positive Quantiferon-Tb. No chorioretinal lesions  either eye to indicate ocular Tb.   - Continue latent Tb treatment, started 6.11.19.   - Ocular Tb is less likely but remains a possibility. In the future, if scleritis/uveitis flares and cannot be controlled with corticosteroids, could consider active Tb treatment.    Follow up in 1-2 weeks with Dr. Garza and 1 month with uveitis clinic, V/T/D/mac OCT/Optos fluorescein angiography transit left eye      Attending Physician Attestation:  Complete documentation of historical and exam elements from today's encounter can be found in the full encounter summary report (not reduplicated in this progress note).  I personally obtained the chief complaint(s) and history of present illness.  I confirmed and edited as necessary the review of systems, past medical/surgical history, family history, social history, and examination findings as documented by others; and I examined the patient myself.  I personally reviewed the relevant tests, images, and reports as documented above.  I formulated and edited as necessary the assessment and plan and discussed the findings and management plan with the patient and family.  - Katie Young M.D.

## 2019-06-28 NOTE — PROVIDER NOTIFICATION
Time of notification: 6:45 PM  Provider notified: dr Troy   Patient status: pt has left to eye clinic at 1:30 and has not come back yet  Until now 5:30 pm . Called eye clinic they said that she is still with the eye dr.   Temp:  [98.3  F (36.8  C)-98.8  F (37.1  C)] 98.3  F (36.8  C)  Pulse:  [68-78] 72  Heart Rate:  [72-84] 76  Resp:  [16-18] 18  BP: (127-147)/(50-68) 139/68  SpO2:  [96 %-99 %] 99 %  Orders received: no new orders , update Md when pt is back and if unstable. Will continue to monitor.

## 2019-06-28 NOTE — Clinical Note
Ms. Carlos's uveitis was quiescent. Carolee - she missed her follow up with you since she was being evaluated for GI bleed and she is rescheduled in ~2 weeks. Also, do you know which special tests on the vitreous fluid are pending? I could not determine specifics on the testing ordered from the orders. Thanks, Katie

## 2019-06-28 NOTE — PLAN OF CARE
OT 6B: Pt requesting return of writer this AM due to recently being up and fatigued. At eye clinic this PM on check-in. Will reschedule.

## 2019-06-29 ENCOUNTER — APPOINTMENT (OUTPATIENT)
Dept: OCCUPATIONAL THERAPY | Facility: CLINIC | Age: 74
DRG: 378 | End: 2019-06-29
Payer: MEDICARE

## 2019-06-29 LAB
ABO + RH BLD: NORMAL
ABO + RH BLD: NORMAL
ANION GAP SERPL CALCULATED.3IONS-SCNC: 6 MMOL/L (ref 3–14)
BASOPHILS # BLD AUTO: 0 10E9/L (ref 0–0.2)
BASOPHILS NFR BLD AUTO: 0 %
BLD GP AB SCN SERPL QL: NORMAL
BLD PROD TYP BPU: NORMAL
BLD PROD TYP BPU: NORMAL
BLD UNIT ID BPU: 0
BLOOD BANK CMNT PATIENT-IMP: NORMAL
BLOOD PRODUCT CODE: NORMAL
BPU ID: NORMAL
BUN SERPL-MCNC: 19 MG/DL (ref 7–30)
CALCIUM SERPL-MCNC: 7.6 MG/DL (ref 8.5–10.1)
CHLORIDE SERPL-SCNC: 112 MMOL/L (ref 94–109)
CO2 SERPL-SCNC: 25 MMOL/L (ref 20–32)
CREAT SERPL-MCNC: 0.83 MG/DL (ref 0.52–1.04)
DIFFERENTIAL METHOD BLD: ABNORMAL
EOSINOPHIL # BLD AUTO: 0 10E9/L (ref 0–0.7)
EOSINOPHIL NFR BLD AUTO: 0 %
ERYTHROCYTE [DISTWIDTH] IN BLOOD BY AUTOMATED COUNT: 17.8 % (ref 10–15)
GFR SERPL CREATININE-BSD FRML MDRD: 69 ML/MIN/{1.73_M2}
GLUCOSE SERPL-MCNC: 85 MG/DL (ref 70–99)
HCT VFR BLD AUTO: 22.6 % (ref 35–47)
HGB BLD-MCNC: 6.8 G/DL (ref 11.7–15.7)
HGB BLD-MCNC: 8.3 G/DL (ref 11.7–15.7)
HGB BLD-MCNC: 8.8 G/DL (ref 11.7–15.7)
IMM GRANULOCYTES # BLD: 0.1 10E9/L (ref 0–0.4)
IMM GRANULOCYTES NFR BLD: 0.8 %
LYMPHOCYTES # BLD AUTO: 0.7 10E9/L (ref 0.8–5.3)
LYMPHOCYTES NFR BLD AUTO: 6.3 %
MCH RBC QN AUTO: 30.9 PG (ref 26.5–33)
MCHC RBC AUTO-ENTMCNC: 30.1 G/DL (ref 31.5–36.5)
MCV RBC AUTO: 103 FL (ref 78–100)
MONOCYTES # BLD AUTO: 0.5 10E9/L (ref 0–1.3)
MONOCYTES NFR BLD AUTO: 4.1 %
NEUTROPHILS # BLD AUTO: 10.1 10E9/L (ref 1.6–8.3)
NEUTROPHILS NFR BLD AUTO: 88.8 %
NRBC # BLD AUTO: 0 10*3/UL
NRBC BLD AUTO-RTO: 0 /100
NUM BPU REQUESTED: 2
PLATELET # BLD AUTO: 213 10E9/L (ref 150–450)
POTASSIUM SERPL-SCNC: 4 MMOL/L (ref 3.4–5.3)
RBC # BLD AUTO: 2.2 10E12/L (ref 3.8–5.2)
SODIUM SERPL-SCNC: 142 MMOL/L (ref 133–144)
SPECIMEN EXP DATE BLD: NORMAL
TRANSFUSION STATUS PATIENT QL: NORMAL
TRANSFUSION STATUS PATIENT QL: NORMAL
WBC # BLD AUTO: 11.3 10E9/L (ref 4–11)

## 2019-06-29 PROCEDURE — 25800030 ZZH RX IP 258 OP 636: Performed by: PHYSICIAN ASSISTANT

## 2019-06-29 PROCEDURE — 85018 HEMOGLOBIN: CPT | Performed by: PHYSICIAN ASSISTANT

## 2019-06-29 PROCEDURE — 25000132 ZZH RX MED GY IP 250 OP 250 PS 637: Mod: GY | Performed by: PHYSICIAN ASSISTANT

## 2019-06-29 PROCEDURE — 25000132 ZZH RX MED GY IP 250 OP 250 PS 637: Mod: GY | Performed by: INTERNAL MEDICINE

## 2019-06-29 PROCEDURE — C9113 INJ PANTOPRAZOLE SODIUM, VIA: HCPCS | Performed by: PHYSICIAN ASSISTANT

## 2019-06-29 PROCEDURE — 36592 COLLECT BLOOD FROM PICC: CPT | Performed by: PHYSICIAN ASSISTANT

## 2019-06-29 PROCEDURE — 97530 THERAPEUTIC ACTIVITIES: CPT | Mod: GO

## 2019-06-29 PROCEDURE — 80048 BASIC METABOLIC PNL TOTAL CA: CPT | Performed by: INTERNAL MEDICINE

## 2019-06-29 PROCEDURE — 25000128 H RX IP 250 OP 636: Performed by: INTERNAL MEDICINE

## 2019-06-29 PROCEDURE — 25000128 H RX IP 250 OP 636: Performed by: PHYSICIAN ASSISTANT

## 2019-06-29 PROCEDURE — 85025 COMPLETE CBC W/AUTO DIFF WBC: CPT | Performed by: INTERNAL MEDICINE

## 2019-06-29 PROCEDURE — 36592 COLLECT BLOOD FROM PICC: CPT | Performed by: INTERNAL MEDICINE

## 2019-06-29 PROCEDURE — 25000131 ZZH RX MED GY IP 250 OP 636 PS 637: Mod: GY | Performed by: INTERNAL MEDICINE

## 2019-06-29 PROCEDURE — P9016 RBC LEUKOCYTES REDUCED: HCPCS | Performed by: EMERGENCY MEDICINE

## 2019-06-29 PROCEDURE — 36415 COLL VENOUS BLD VENIPUNCTURE: CPT | Performed by: PHYSICIAN ASSISTANT

## 2019-06-29 PROCEDURE — 99233 SBSQ HOSP IP/OBS HIGH 50: CPT | Performed by: INTERNAL MEDICINE

## 2019-06-29 PROCEDURE — 12000004 ZZH R&B IMCU UMMC

## 2019-06-29 PROCEDURE — 40000893 ZZH STATISTIC PT IP EVAL DEFER

## 2019-06-29 RX ORDER — PREDNISOLONE ACETATE 10 MG/ML
1 SUSPENSION/ DROPS OPHTHALMIC 2 TIMES DAILY
Status: DISCONTINUED | OUTPATIENT
Start: 2019-06-29 | End: 2019-07-01 | Stop reason: HOSPADM

## 2019-06-29 RX ORDER — PREDNISONE 20 MG/1
40 TABLET ORAL DAILY
Status: DISCONTINUED | OUTPATIENT
Start: 2019-06-30 | End: 2019-07-01 | Stop reason: HOSPADM

## 2019-06-29 RX ORDER — METOPROLOL SUCCINATE 25 MG/1
25 TABLET, EXTENDED RELEASE ORAL DAILY
Status: DISCONTINUED | OUTPATIENT
Start: 2019-06-29 | End: 2019-07-01 | Stop reason: HOSPADM

## 2019-06-29 RX ADMIN — PREDNISOLONE ACETATE 1 DROP: 10 SUSPENSION/ DROPS OPHTHALMIC at 19:36

## 2019-06-29 RX ADMIN — ISONIAZID 300 MG: 300 TABLET ORAL at 08:03

## 2019-06-29 RX ADMIN — SODIUM CHLORIDE, POTASSIUM CHLORIDE, SODIUM LACTATE AND CALCIUM CHLORIDE: 600; 310; 30; 20 INJECTION, SOLUTION INTRAVENOUS at 18:05

## 2019-06-29 RX ADMIN — NEOMYCIN SULFATE, POLYMYXIN B SULFATE AND DEXAMETHASONE 1 DROP: 3.5; 10000; 1 SUSPENSION OPHTHALMIC at 08:02

## 2019-06-29 RX ADMIN — NEOMYCIN SULFATE, POLYMYXIN B SULFATE AND DEXAMETHASONE 1 DROP: 3.5; 10000; 1 SUSPENSION OPHTHALMIC at 19:36

## 2019-06-29 RX ADMIN — NEOMYCIN SULFATE, POLYMYXIN B SULFATE AND DEXAMETHASONE 1 DROP: 3.5; 10000; 1 SUSPENSION OPHTHALMIC at 11:52

## 2019-06-29 RX ADMIN — PREDNISONE 60 MG: 10 TABLET ORAL at 08:03

## 2019-06-29 RX ADMIN — NEOMYCIN SULFATE, POLYMYXIN B SULFATE, AND DEXAMETHASONE 0.5 G: 3.5; 10000; 1 OINTMENT OPHTHALMIC at 22:33

## 2019-06-29 RX ADMIN — SODIUM CHLORIDE 8 MG/HR: 9 INJECTION, SOLUTION INTRAVENOUS at 19:29

## 2019-06-29 RX ADMIN — Medication 25 MG: at 08:03

## 2019-06-29 RX ADMIN — PIPERACILLIN AND TAZOBACTAM 3.38 G: 3; .375 INJECTION, POWDER, FOR SOLUTION INTRAVENOUS at 11:52

## 2019-06-29 RX ADMIN — PREDNISOLONE ACETATE 1 DROP: 10 SUSPENSION/ DROPS OPHTHALMIC at 08:02

## 2019-06-29 RX ADMIN — SODIUM CHLORIDE, POTASSIUM CHLORIDE, SODIUM LACTATE AND CALCIUM CHLORIDE: 600; 310; 30; 20 INJECTION, SOLUTION INTRAVENOUS at 20:39

## 2019-06-29 RX ADMIN — METOPROLOL SUCCINATE 25 MG: 25 TABLET, EXTENDED RELEASE ORAL at 16:47

## 2019-06-29 RX ADMIN — PIPERACILLIN AND TAZOBACTAM 3.38 G: 3; .375 INJECTION, POWDER, FOR SOLUTION INTRAVENOUS at 06:11

## 2019-06-29 RX ADMIN — NEOMYCIN SULFATE, POLYMYXIN B SULFATE AND DEXAMETHASONE 1 DROP: 3.5; 10000; 1 SUSPENSION OPHTHALMIC at 16:47

## 2019-06-29 RX ADMIN — ATROPINE SULFATE 1 DROP: 10 SOLUTION/ DROPS OPHTHALMIC at 08:02

## 2019-06-29 RX ADMIN — PIPERACILLIN AND TAZOBACTAM 3.38 G: 3; .375 INJECTION, POWDER, FOR SOLUTION INTRAVENOUS at 23:55

## 2019-06-29 RX ADMIN — PIPERACILLIN AND TAZOBACTAM 3.38 G: 3; .375 INJECTION, POWDER, FOR SOLUTION INTRAVENOUS at 19:24

## 2019-06-29 RX ADMIN — OYSTER SHELL CALCIUM WITH VITAMIN D 1 TABLET: 500; 200 TABLET, FILM COATED ORAL at 19:35

## 2019-06-29 RX ADMIN — OYSTER SHELL CALCIUM WITH VITAMIN D 1 TABLET: 500; 200 TABLET, FILM COATED ORAL at 08:03

## 2019-06-29 ASSESSMENT — ACTIVITIES OF DAILY LIVING (ADL)
ADLS_ACUITY_SCORE: 16

## 2019-06-29 ASSESSMENT — SLIT LAMP EXAM - LIDS
COMMENTS: NORMAL
COMMENTS: PROTECTIVE PTOSIS

## 2019-06-29 ASSESSMENT — EXTERNAL EXAM - RIGHT EYE: OD_EXAM: NORMAL

## 2019-06-29 ASSESSMENT — EXTERNAL EXAM - LEFT EYE: OS_EXAM: NORMAL

## 2019-06-29 NOTE — PROGRESS NOTES
"[facility], [place]   Internal Medicine Daily Note           Interval History/Events     Overnight events reviewed  No abdominal pain  Had BM this AM, no blood in stool  No lightheadedness or dizziness  No burning urination       Review of Systems        4 point ROS including Respiratory, CV, GI and , other than that noted above is negative      Medications   I have reviewed current medications  in the \"current medication\" section of Epic.  Relevant changes include:     Physical Exam   General:       Vital signs:    Blood pressure 142/70, pulse 66, temperature 98.4  F (36.9  C), temperature source Oral, resp. rate 18, weight 77.7 kg (171 lb 4.8 oz), SpO2 97 %, not currently breastfeeding.  Estimated body mass index is 30.34 kg/m  as calculated from the following:    Height as of 6/17/19: 1.6 m (5' 3\").    Weight as of this encounter: 77.7 kg (171 lb 4.8 oz).      Intake/Output Summary (Last 24 hours) at 6/27/2019 1338  Last data filed at 6/27/2019 0941  Gross per 24 hour   Intake 466.67 ml   Output 150 ml   Net 316.67 ml      HEENT: No icterus, no pallor  Cardiovascular: S1, S2 normal  Respiratory:  B/L CTA  GI/Abdomen: Soft, NT, BS+  Neurology: Alert, awake,and oriented. No tremors.   Extremities: No pretibial edema.   Skin:      Laboratory and Imaging Studies     I have reviewed  laboratory and imaging studies in the Epic. Pertinent findings are as below:    BMP  Recent Labs   Lab 06/29/19 0525 06/28/19 0920 06/27/19 0315 06/26/19  1622    140 142 136   POTASSIUM 4.0 4.0 4.0 4.7   CHLORIDE 112* 111* 110* 107   MAGGY 7.6* 7.5* 7.1* 7.5*   CO2 25 25 24 22   BUN 19 20 36* 40*   CR 0.83 0.85 0.94 1.05*   GLC 85 94 109* 286*     CBC  Recent Labs   Lab 06/29/19 0525  06/28/19 0920 06/27/19 0315 06/26/19  1622   WBC 11.3*  --  12.5*  --  13.8* 14.4*   RBC 2.20*  --  2.36*  --  2.56* 2.14*   HGB 6.8*   < > 7.3*   < > 7.8* 6.9*   HCT 22.6*  --  24.3*  --  25.5* 22.9*   *  -- "  103*  --  100 107*   MCH 30.9  --  30.9  --  30.5 32.2   MCHC 30.1*  --  30.0*  --  30.6* 30.1*   RDW 17.8*  --  18.9*  --  17.8* 14.6     --  230  --  207 271    < > = values in this interval not displayed.     INR  Recent Labs   Lab 06/27/19 0315   INR 1.09     LFTs  Recent Labs   Lab 06/27/19 0315 06/26/19  1622   ALKPHOS 74 90   AST 8 14   ALT 20 27   BILITOTAL 1.0 0.6   PROTTOTAL 4.3* 5.3*   ALBUMIN 1.8* 2.0*      PANCNo lab results found in last 7 days.        Impression/Plan        74 year old female with history of HTN, ocular syphilis (dx 6/2019), retinal detachment, latent TB (currently on therapy), CKD III and breast cancer s/p masectomy who was admitted to [facility] internal medicine 6/26/19 with melena and hgb 6.9     # Acute blood loss anemia, GI bleed  # Possible small bowel diverticulitis, and perforation on CT Abdomen 06/27: PTA 4 days melena, dizziness, diffuse abdominal discomfort. Found to have hgb 6.3 in clinic and stent to ED. Hgb 6.9 in ED, treated with 1U RBC and IV PPI. WBC 14.4. Trop x1 negative. Denies etoh use. Etiology concerning GIB 2/2 recent prednisone rx and NSAID use.   Case discussed with GI, Surgery, and Ophthalmology on 06/27 Ophthalmology would like to continue on Prednisone and taper when she sees Ophthalmology on 06/28/2019  Okay to use if indicated on discussion with GI.   Hg 7.3  Gm/dl on 06/28  Hg 6.8 gm/dl on 06/29   - Clear liquid diet, and advance as tolerated to low residue diet  - 1 unit PRBC tranfusion  - PPI drip for 72 hours  -  Continue Zosyn for diverticulitis  - Hold NSAIDs  - Serial Abdominal exam  - H Pylori antigen (advised RN to collect)  - Hg 2 hours post transfusion, and every day     # CKD III with elevated Cr: BL Cr around 0.9. Elevated to 1.05 in the setting of GIB and poor oral intake. 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 +T pallidum and +trepomena antibodies despite NR anti-treponema RPP. Plan was to treated with PCN with plans for 14 day course (through 6/21), however, appears as if TCU continued PCN through at least 6/25. Now s/p vitrectomy, lensectomy, etc  Discussed with Ophthalmology on 06/27. Advised to continue following treatment.   Seen in Eye clinic on 06/28 with reduction in dose of Prednisone to 40 mg and Pred forte to BID.   - Continue Thornton Shield at all times   - Continue Atropine, maxitrol suspension  - Reduce Pred Forte to BID  - Reduce Prednisone to 40 mg daily      # Latent TB: +Quant gold 6/6/19. CXR 6/6 no acute findings. Asymptomatic. Brother with h/o TB. LFts normal   - Continue Isoniazid      # HTN: PTA on metoprolol. BP stable on admission  - Start Metoprolol        Diet: Orders Placed This Encounter      Advance Diet as Tolerated: Regular Diet Adult; Low Fiber     DVT Prophylaxis: Pneumatic Compression Devices  Cronin Catheter: not present  Code Status: DNR/DNI       Disposition Plan   Expected discharge in 3-4 days to prior living arrangement once Hg stable, Diverticulitis stable.     Entered: Jefferson Lopez 06/29/2019, 1:49 PM            Pt's care was discussed with bedside RN, patient and  during Care Team Rounds.               Jefferson Lopez MD  Hospitalist ( Internal medicine)  Pager: [phone]

## 2019-06-29 NOTE — PLAN OF CARE
PT / 6B - PT orders received and acknowledged. Per discussion with OT - pt with no acute PT needs at this time. Pt mainly limited by endurance, no functional mobility concerns. OT to continue following for therapy and address rehab needs during remainder of hospital stay. PT to complete orders.

## 2019-06-29 NOTE — PROVIDER NOTIFICATION
Gold cross cover paged - Call from lab stating that blood transfusion order does not match pt's blood. RBC Leukocyte reduced is what pt received in past.

## 2019-06-29 NOTE — PLAN OF CARE
Neuro: A&Ox4.   Cardiac: SR. VSS. Had one unit of RBC and Hgb recheck is 8.8.    Respiratory: Sating 98% on RA.  GI/: Adequate urine output. BM X1, no sign or symptom of bleeding noted.   Diet/appetite: Tolerating regular diet. Eating well.  Activity:  Assist of one up to chair and using the bathroom.   Pain: At acceptable level on current regimen.   Skin: No new deficits noted.  LDA's: PICC patent and infusing well.   Plan: Continue with POC. Notify primary team with changes.

## 2019-06-29 NOTE — PROGRESS NOTES
Surgery Progress Note  06/29/2019       Subjective:  - EMILIA overnight.  - passing gas  - patient doing well, no pain     Objective:  Temp:  [97.5  F (36.4  C)-98.5  F (36.9  C)] 98.4  F (36.9  C)  Pulse:  [72] 72  Heart Rate:  [68-84] 84  Resp:  [16-18] 16  BP: (129-145)/(55-68) 129/58  SpO2:  [98 %-99 %] 98 %    I/O last 3 completed shifts:  In: 3490 [I.V.:3490]  Out: 650 [Urine:650]      Gen: Awake, alert, NAD  Resp: NLB on RA  Abd: soft, nondistended, nontender  Ext: WWP, no edema     Labs:  Recent Labs   Lab 06/29/19  0525 06/28/19  2209 06/28/19  1750  06/28/19  0920  06/27/19  0315   WBC 11.3*  --   --   --  12.5*  --  13.8*   HGB 6.8* 7.0* 7.8*   < > 7.3*   < > 7.8*     --   --   --  230  --  207    < > = values in this interval not displayed.       Recent Labs   Lab 06/29/19  0525 06/28/19  0920 06/27/19  0315    140 142   POTASSIUM 4.0 4.0 4.0   CHLORIDE 112* 111* 110*   CO2 25 25 24   BUN 19 20 36*   CR 0.83 0.85 0.94   GLC 85 94 109*   MAGGY 7.6* 7.5* 7.1*       Imaging:  CT AP 6/27  IMPRESSION:   1. Findings consistent with small bowel diverticulitis in a segment of  mid ileum. There is a focus of air adjacent to this area of bowel  measuring 1.9 cm without definable wall. This may be a contained  perforation versus larger diverticulum. No abscess.     Assessment/Plan:   74 year old female admitted to [facility] internal medicine 6/26/19 with melena, hgb 6.9, WBC 14.4 and new concern for small bowel perforation. no abdominal pain. contained perforation is inconclusive but there is definite small bowel diverticulitis. No surgical intervention at this time.    - Advance diet per GI  - Consider low residue diet.  - Continue 14 days of antibiotics.  - GI bleed per primary team and GI  - Surgery will sign off, please call/page with questions or worsening abdominal exam    Seen, examined, and discussed with chief resident, who will discuss with staff.  - - - - - - - - - - - - - - - - - -  Sami Barriga,  PGY-2  General Surgery

## 2019-06-29 NOTE — PROVIDER NOTIFICATION
Gold cross cover notified - Critical Hbg of 6.8. No signs of bleeding.     Orders to transfuse 1U RBC. Will continue to monitor.

## 2019-06-29 NOTE — PLAN OF CARE
Status: Patient admitted for GI bleed.  VS: VSS on RA.  Neuros: A&Ox4.  Cardiovascular: SR 60-80s.   GI/: Tolerating clear liquid diet. LBM 6/27. Voiding spontaneously.   IV: R PICC infusing w/ LR @ 100 ml/hr and Protonix gtt @ 8 mg/hr.  Activity: Up w/ SBA.  Pain: Denies.  Respiratory/Trach: No issues.  Skin: Bruising noted.   Labs: Trending Hbg Q6H. 7.0 @ 2200. 6.8 @ 0500.  No signs of active bleeding. MD notified, waiting for orders.  Plan of Care: Continue to monitor for signs of bleeding. Follow POC.

## 2019-06-29 NOTE — PROGRESS NOTES
BRIEF GI FELLOW CONSULT NOTE:    73 yo F w/ hx of ocular syphilitis with panuveitis and retinal detachment (recent diagnosis, treated with high dose steroids and indomethacin), latent TB (currently on therapy), HTN, CKD stage III and hx of breast cancer s/p mastectomy who has been admitted under the medicine service for management of melena and acute blood loss anemia (Hb 6.3, down from baseline of 11-12). GI consulted to assist in further management.  Interestingly, CT abd/pel done for abdominal pain showed small bowel diverticulitis with possible perforation since there was a moderate sized central focus of air adjacent to the bowel with no definable wall. Patient had minimal pain on palpation, otherwise benign exam. Mild leukocytosis on labs.     Remains HDS and Hb remains stable. Differentials for melena are likely UGIB in setting of recent NSAID and high dose steroids use leading to PUD vs esophagitis/gastritis vs AVM vs Dieulofuoy vs esophageal/gastric mass (less likely). Small bowel diverticulitis can also possible be a cause of patient's GI bleeding (could be NSAID induced ulceration in that area of the small bowel)     Ideally would have done an upper endoscopy but patient is currently stable and there is concern for complicated small bowel diverticulitis and perforation which will worsen with CO2/air insufflation during endoscopy.      RECOMMENDATIONS:  - Continue IV pantoprazole infusion for a total of 72 hours and then switched to twice daily oral PPI for at least 8 weeks, and then once daily PPI thereafter  - Please check an H Pylori stool AG. Treat with quadruple therapy if positive  - IV antimicrobials for complicated small bowel diverticulitis. Management deferred to general surgery team.  - Diet clear liquids for today and advance as tolerated.  - No plans for an endoscopy at this time. Will only pursue this if the patient has signs of clinically significant bleeding such as 2gm/dl drop in 24 hours  or signs of shock related to her bleeding.   - Avoid any NSAIDs. Can consider acetaminophen for pain control.  - If patient's Hb remains stable and patient is planned for discharged, please order an EGD for 8 weeks after discharge to evaluate for any upper GI Tract lesions that could have resulted in melena and blood loss. She should be seen by general surgery as an outpatient prior to her endoscopy to ensure her small bowel diverticulitis has improved.      Gastroenterology team will sign off. Thank you for involving us in this patient's care. Please do not hesitate to contact the GI service with any questions or concerns.   Patient care plan has been discussed with Dr. Calvo, GI staff physician.     Brittani Bowen  Gastroenterology Fellow  P [phone]

## 2019-06-30 ENCOUNTER — APPOINTMENT (OUTPATIENT)
Dept: ULTRASOUND IMAGING | Facility: CLINIC | Age: 74
DRG: 378 | End: 2019-06-30
Attending: INTERNAL MEDICINE
Payer: MEDICARE

## 2019-06-30 ENCOUNTER — APPOINTMENT (OUTPATIENT)
Dept: OCCUPATIONAL THERAPY | Facility: CLINIC | Age: 74
DRG: 378 | End: 2019-06-30
Payer: MEDICARE

## 2019-06-30 LAB
ANION GAP SERPL CALCULATED.3IONS-SCNC: 6 MMOL/L (ref 3–14)
BASOPHILS # BLD AUTO: 0 10E9/L (ref 0–0.2)
BASOPHILS NFR BLD AUTO: 0.1 %
BUN SERPL-MCNC: 16 MG/DL (ref 7–30)
CALCIUM SERPL-MCNC: 7.8 MG/DL (ref 8.5–10.1)
CHLORIDE SERPL-SCNC: 110 MMOL/L (ref 94–109)
CO2 SERPL-SCNC: 25 MMOL/L (ref 20–32)
CREAT SERPL-MCNC: 0.89 MG/DL (ref 0.52–1.04)
DIFFERENTIAL METHOD BLD: ABNORMAL
EOSINOPHIL # BLD AUTO: 0 10E9/L (ref 0–0.7)
EOSINOPHIL NFR BLD AUTO: 0 %
ERYTHROCYTE [DISTWIDTH] IN BLOOD BY AUTOMATED COUNT: 17.8 % (ref 10–15)
GFR SERPL CREATININE-BSD FRML MDRD: 63 ML/MIN/{1.73_M2}
GLUCOSE SERPL-MCNC: 86 MG/DL (ref 70–99)
HCT VFR BLD AUTO: 27.1 % (ref 35–47)
HGB BLD-MCNC: 8 G/DL (ref 11.7–15.7)
HGB BLD-MCNC: 8.2 G/DL (ref 11.7–15.7)
HGB BLD-MCNC: 8.4 G/DL (ref 11.7–15.7)
HGB BLD-MCNC: 8.7 G/DL (ref 11.7–15.7)
IMM GRANULOCYTES # BLD: 0.1 10E9/L (ref 0–0.4)
IMM GRANULOCYTES NFR BLD: 0.7 %
LYMPHOCYTES # BLD AUTO: 0.7 10E9/L (ref 0.8–5.3)
LYMPHOCYTES NFR BLD AUTO: 5.7 %
MCH RBC QN AUTO: 30.1 PG (ref 26.5–33)
MCHC RBC AUTO-ENTMCNC: 30.3 G/DL (ref 31.5–36.5)
MCV RBC AUTO: 100 FL (ref 78–100)
MONOCYTES # BLD AUTO: 0.6 10E9/L (ref 0–1.3)
MONOCYTES NFR BLD AUTO: 4.9 %
NEUTROPHILS # BLD AUTO: 10.8 10E9/L (ref 1.6–8.3)
NEUTROPHILS NFR BLD AUTO: 88.6 %
NRBC # BLD AUTO: 0 10*3/UL
NRBC BLD AUTO-RTO: 0 /100
PLATELET # BLD AUTO: 221 10E9/L (ref 150–450)
POTASSIUM SERPL-SCNC: 3.8 MMOL/L (ref 3.4–5.3)
RBC # BLD AUTO: 2.72 10E12/L (ref 3.8–5.2)
SODIUM SERPL-SCNC: 141 MMOL/L (ref 133–144)
WBC # BLD AUTO: 12.2 10E9/L (ref 4–11)

## 2019-06-30 PROCEDURE — 25000131 ZZH RX MED GY IP 250 OP 636 PS 637: Mod: GY | Performed by: INTERNAL MEDICINE

## 2019-06-30 PROCEDURE — 99233 SBSQ HOSP IP/OBS HIGH 50: CPT | Performed by: INTERNAL MEDICINE

## 2019-06-30 PROCEDURE — 25000128 H RX IP 250 OP 636: Performed by: INTERNAL MEDICINE

## 2019-06-30 PROCEDURE — 36592 COLLECT BLOOD FROM PICC: CPT | Performed by: PHYSICIAN ASSISTANT

## 2019-06-30 PROCEDURE — 36592 COLLECT BLOOD FROM PICC: CPT | Performed by: INTERNAL MEDICINE

## 2019-06-30 PROCEDURE — C9113 INJ PANTOPRAZOLE SODIUM, VIA: HCPCS | Performed by: PHYSICIAN ASSISTANT

## 2019-06-30 PROCEDURE — 93971 EXTREMITY STUDY: CPT | Mod: RT

## 2019-06-30 PROCEDURE — 25000132 ZZH RX MED GY IP 250 OP 250 PS 637: Mod: GY | Performed by: INTERNAL MEDICINE

## 2019-06-30 PROCEDURE — 25000132 ZZH RX MED GY IP 250 OP 250 PS 637: Mod: GY | Performed by: PHYSICIAN ASSISTANT

## 2019-06-30 PROCEDURE — 12000001 ZZH R&B MED SURG/OB UMMC

## 2019-06-30 PROCEDURE — 85025 COMPLETE CBC W/AUTO DIFF WBC: CPT | Performed by: INTERNAL MEDICINE

## 2019-06-30 PROCEDURE — 85018 HEMOGLOBIN: CPT | Performed by: PHYSICIAN ASSISTANT

## 2019-06-30 PROCEDURE — 25000128 H RX IP 250 OP 636: Performed by: PHYSICIAN ASSISTANT

## 2019-06-30 PROCEDURE — 85018 HEMOGLOBIN: CPT | Performed by: INTERNAL MEDICINE

## 2019-06-30 PROCEDURE — 25800030 ZZH RX IP 258 OP 636: Performed by: PHYSICIAN ASSISTANT

## 2019-06-30 PROCEDURE — 97110 THERAPEUTIC EXERCISES: CPT | Mod: GO

## 2019-06-30 PROCEDURE — 80048 BASIC METABOLIC PNL TOTAL CA: CPT | Performed by: INTERNAL MEDICINE

## 2019-06-30 RX ORDER — PANTOPRAZOLE SODIUM 40 MG/1
40 TABLET, DELAYED RELEASE ORAL
Status: DISCONTINUED | OUTPATIENT
Start: 2019-06-30 | End: 2019-07-01 | Stop reason: HOSPADM

## 2019-06-30 RX ADMIN — PREDNISOLONE ACETATE 1 DROP: 10 SUSPENSION/ DROPS OPHTHALMIC at 19:50

## 2019-06-30 RX ADMIN — PREDNISONE 40 MG: 20 TABLET ORAL at 08:40

## 2019-06-30 RX ADMIN — NEOMYCIN SULFATE, POLYMYXIN B SULFATE AND DEXAMETHASONE 1 DROP: 3.5; 10000; 1 SUSPENSION OPHTHALMIC at 08:39

## 2019-06-30 RX ADMIN — PIPERACILLIN AND TAZOBACTAM 3.38 G: 3; .375 INJECTION, POWDER, FOR SOLUTION INTRAVENOUS at 18:08

## 2019-06-30 RX ADMIN — PIPERACILLIN AND TAZOBACTAM 3.38 G: 3; .375 INJECTION, POWDER, FOR SOLUTION INTRAVENOUS at 05:49

## 2019-06-30 RX ADMIN — PIPERACILLIN AND TAZOBACTAM 3.38 G: 3; .375 INJECTION, POWDER, FOR SOLUTION INTRAVENOUS at 12:14

## 2019-06-30 RX ADMIN — OYSTER SHELL CALCIUM WITH VITAMIN D 1 TABLET: 500; 200 TABLET, FILM COATED ORAL at 08:40

## 2019-06-30 RX ADMIN — PANTOPRAZOLE SODIUM 40 MG: 40 TABLET, DELAYED RELEASE ORAL at 16:03

## 2019-06-30 RX ADMIN — SODIUM CHLORIDE 8 MG/HR: 9 INJECTION, SOLUTION INTRAVENOUS at 05:10

## 2019-06-30 RX ADMIN — SODIUM CHLORIDE, POTASSIUM CHLORIDE, SODIUM LACTATE AND CALCIUM CHLORIDE: 600; 310; 30; 20 INJECTION, SOLUTION INTRAVENOUS at 10:11

## 2019-06-30 RX ADMIN — NEOMYCIN SULFATE, POLYMYXIN B SULFATE AND DEXAMETHASONE 1 DROP: 3.5; 10000; 1 SUSPENSION OPHTHALMIC at 19:50

## 2019-06-30 RX ADMIN — ATROPINE SULFATE 1 DROP: 10 SOLUTION/ DROPS OPHTHALMIC at 08:39

## 2019-06-30 RX ADMIN — Medication 25 MG: at 08:40

## 2019-06-30 RX ADMIN — PREDNISOLONE ACETATE 1 DROP: 10 SUSPENSION/ DROPS OPHTHALMIC at 08:39

## 2019-06-30 RX ADMIN — NEOMYCIN SULFATE, POLYMYXIN B SULFATE AND DEXAMETHASONE 1 DROP: 3.5; 10000; 1 SUSPENSION OPHTHALMIC at 12:14

## 2019-06-30 RX ADMIN — PIPERACILLIN AND TAZOBACTAM 3.38 G: 3; .375 INJECTION, POWDER, FOR SOLUTION INTRAVENOUS at 23:57

## 2019-06-30 RX ADMIN — OYSTER SHELL CALCIUM WITH VITAMIN D 1 TABLET: 500; 200 TABLET, FILM COATED ORAL at 19:49

## 2019-06-30 RX ADMIN — NEOMYCIN SULFATE, POLYMYXIN B SULFATE AND DEXAMETHASONE 1 DROP: 3.5; 10000; 1 SUSPENSION OPHTHALMIC at 15:58

## 2019-06-30 RX ADMIN — METOPROLOL SUCCINATE 25 MG: 25 TABLET, EXTENDED RELEASE ORAL at 08:40

## 2019-06-30 RX ADMIN — ISONIAZID 300 MG: 300 TABLET ORAL at 08:40

## 2019-06-30 RX ADMIN — NEOMYCIN SULFATE, POLYMYXIN B SULFATE, AND DEXAMETHASONE 0.5 G: 3.5; 10000; 1 OINTMENT OPHTHALMIC at 23:59

## 2019-06-30 ASSESSMENT — ACTIVITIES OF DAILY LIVING (ADL)
ADLS_ACUITY_SCORE: 16

## 2019-06-30 NOTE — PROGRESS NOTES
"[facility], [place]   Internal Medicine Daily Note           Interval History/Events     Overnight events reviewed  Reports doing well  No abdominal pain, tolerating diet well  No nausea, vomiting  No blood in stool. BM this AM  No fever, chills.        Review of Systems        4 point ROS including Respiratory, CV, GI and , other than that noted above is negative      Medications   I have reviewed current medications  in the \"current medication\" section of Epic.  Relevant changes include:     Physical Exam   General:       Vital signs:    Blood pressure 144/64, pulse 71, temperature 97.3  F (36.3  C), temperature source Oral, resp. rate 18, weight 79 kg (174 lb 2.6 oz), SpO2 98 %, not currently breastfeeding.  Estimated body mass index is 30.85 kg/m  as calculated from the following:    Height as of 6/17/19: 1.6 m (5' 3\").    Weight as of this encounter: 79 kg (174 lb 2.6 oz).      Intake/Output Summary (Last 24 hours) at 6/27/2019 1338  Last data filed at 6/27/2019 0941  Gross per 24 hour   Intake 466.67 ml   Output 150 ml   Net 316.67 ml      HEENT: No icterus, no pallor  Cardiovascular: S1, S2 normal  Respiratory:  B/L CTA  GI/Abdomen: Soft, NT, BS+  Neurology: Alert, awake,and oriented. No tremors.   Extremities: No pretibial edema.   Skin:      Laboratory and Imaging Studies     I have reviewed  laboratory and imaging studies in the Epic. Pertinent findings are as below:    BMP  Recent Labs   Lab 06/30/19  0545 06/29/19  0525 06/28/19  0920 06/27/19  0315    142 140 142   POTASSIUM 3.8 4.0 4.0 4.0   CHLORIDE 110* 112* 111* 110*   MAGGY 7.8* 7.6* 7.5* 7.1*   CO2 25 25 25 24   BUN 16 19 20 36*   CR 0.89 0.83 0.85 0.94   GLC 86 85 94 109*     CBC  Recent Labs   Lab 06/30/19  0545  06/29/19  0525  06/28/19  0920  06/27/19  0315   WBC 12.2*  --  11.3*  --  12.5*  --  13.8*   RBC 2.72*  --  2.20*  --  2.36*  --  2.56*   HGB 8.2*   < > 6.8*   < > 7.3*   < > 7.8*   HCT 27.1*  --  " 22.6*  --  24.3*  --  25.5*     --  103*  --  103*  --  100   MCH 30.1  --  30.9  --  30.9  --  30.5   MCHC 30.3*  --  30.1*  --  30.0*  --  30.6*   RDW 17.8*  --  17.8*  --  18.9*  --  17.8*     --  213  --  230  --  207    < > = values in this interval not displayed.     INR  Recent Labs   Lab 06/27/19  0315   INR 1.09     LFTs  Recent Labs   Lab 06/27/19 0315 06/26/19  1622   ALKPHOS 74 90   AST 8 14   ALT 20 27   BILITOTAL 1.0 0.6   PROTTOTAL 4.3* 5.3*   ALBUMIN 1.8* 2.0*      PANCNo lab results found in last 7 days.        Impression/Plan        74 year old female with history of HTN, ocular syphilis (dx 6/2019), retinal detachment, latent TB (currently on therapy), CKD III and breast cancer s/p masectomy who was admitted to [facility] internal medicine 6/26/19 with melena and hgb 6.9     # Acute blood loss anemia, GI bleed  # Possible small bowel diverticulitis, and perforation on CT Abdomen 06/27: PTA 4 days melena, dizziness, diffuse abdominal discomfort. Found to have hgb 6.3 in clinic and stent to ED. Hgb 6.9 in ED, treated with 1U RBC and IV PPI. WBC 14.4. Trop x1 negative. Denies etoh use. Etiology concerning GIB 2/2 recent prednisone rx and NSAID use. She was started on Pantoprazole drip.  Case was discussed with GI, and Surgery on 06/27.  She was kept NPO, and given IV fluids. Hemoglobin, and abdominal exam were monitored closely. As he improved, diet was advanced as tolerated to low residue (fiber diet). Per GI, okay to use Prednisone if clinically indicated.   Hg 6.8 on 06/29 requiring 1 unit PRBC.   - Advance as tolerated to low residue diet  -  Continue Zosyn for diverticulitis  - Hold NSAIDs  - Serial Abdominal exam  - H Pylori antigen (advised RN to collect on 06/30)  - Hg in AM     # CKD III with elevated Cr: BL Cr around 0.9. Elevated to 1.05 in the setting of GIB and poor oral intake. BUN elevated  Creatinine 0.85 on 06/28  - Avoid Nephrotoxins renal dose medications     # Ocular  syphilis in the setting of panuveitis, L eye retinal detachment: Cornering findings noted in ophthalomonolgy clinic propmting admission to [facility] 6/7 to 6/11. +T pallidum and +trepomena antibodies despite NR anti-treponema RPP. Plan was to treated with PCN with plans for 14 day course (through 6/21), however, appears as if TCU continued PCN through at least 6/25. Now s/p vitrectomy, lensectomy, etc  Discussed with Ophthalmology on 06/27. Advised to continue following treatment.   Seen in Eye clinic on 06/28. Prednisone dose was reduced to 40 mg, and Pred forte was reduced to BID.   - Continue Thornton Shield at all times   - Continue Atropine, maxitrol suspension  - Reduce Pred Forte to BID  - Reduce Prednisone to 40 mg daily      # Latent TB: +Quant gold 6/6/19. CXR 6/6 no acute findings. Asymptomatic. Brother with h/o TB. LFts normal   - Continue Isoniazid      # HTN: PTA on metoprolol. BP stable on admission  - Start Metoprolol        Diet: Orders Placed This Encounter      Advance Diet as Tolerated: Regular Diet Adult; Low Fiber     DVT Prophylaxis: Pneumatic Compression Devices  Cronin Catheter: not present  Code Status: DNR/DNI       Disposition Plan   Expected discharge in 1-2 days to prior living arrangement once Hg stable, Diverticulitis stable.     Entered: Jefferson Lopez 06/30/2019, 1:38 PM            Pt's care was discussed with bedside RN, patient and  during Care Team Rounds.               Jefferson Lopez MD  Hospitalist ( Internal medicine)  Pager: [phone]

## 2019-06-30 NOTE — PLAN OF CARE
Discharge Planner OT   Patient plan for discharge: Home with assistance from daughters   Current status: Pt SBA sit-stand and ambulation from room to 6th floor gym. Pt ascended and descended steps x 4 (12 steps total) using B railings and displayed and reports weakness in LE's. HR 93bpm and O2 92% on RA with activity. Pt shows 1-2 minor LOB and unclear if related to eye sight or instability. Pt voices she does not think she needs rehab after return home as she will have assist of family.  Barriers to return to prior living situation: weakness; instability; decreased functional endurance  Recommendations for discharge: Home with assistance from daughters and OP PT for higher level balance deficits/endurance building  Rationale for recommendations: weakness, decreased functional endurance; instability       Entered by: Toribio Man 06/30/2019 11:11 AM

## 2019-06-30 NOTE — PLAN OF CARE
Discharge Planner OT   Patient plan for discharge: Home with daughters.  Current status: Patient motivated to participate in OT this afternoon. Demonstrated ability to ambulate in hallway x500 feet with CGA and use of single end cane as well as ascend/descend x6 stairs with CGA and use of bilateral railings. Discussed energy conservation strategies with patient. VSS per monitor and daily mobility goals of 3-4 walks per day encouraged to facilitate improve endurance.  Barriers to return to prior living situation: Endurance.  Recommendations for discharge: Home with assistance from daughters and OP PT for higher level balance deficits.  Rationale for recommendations: Patient will continue to benefit from IP OT to progress functional independence.       Entered by: Beth Bolton 06/30/2019 6:22 AM

## 2019-06-30 NOTE — PROGRESS NOTES
[id number]:   Neuro: A&Ox4.    Activity: Up to bathroom with SBA and cane.   Vitals: VSS on 1L NC.     LDAS: R DL PICC infusing Protonix gtt at 8mg/hr and LR at 100mL/hr.   Cardiac: NSR- slightly jemima intermittently (58-65).  Respiratory: Frequently de-sating to 86-88% while on RA. 1L O2 applied, sating %.      GI/: Voiding spontaneously. Positive bowel sounds, no BM this shift.    Skin: Intact. No new deficits.    Pain: Denies.   Diet: Regular, low fiber.   Labs/Imaging: Hgb was 8 at midnight. At 0545 Hgb increased to 8.2. Continue to need a stool sample.   Plan: Continue with IV Zosyn Q6H and monitoring for signs of bleeding.

## 2019-06-30 NOTE — PROGRESS NOTES
Transfer from 6B- alert. Oriented pt to room, call light and staff. Ambulated from hallway to bed with SBA. PICC infusing. Denies pain/nausea. Right arm swollen- US pending. Left eye blurry (not new). VSS.

## 2019-06-30 NOTE — PLAN OF CARE
Neuro: A&Ox4. Left eye swollen and Blurry vision (baseline)  Cardiac:  VSS.   Respiratory: Sating 96% on RA.  GI/: Adequate urine output. BM X1  Diet/appetite: Tolerating regular diet. Eating well.  Activity:  SBA + cane, up to chair and in halls.  Pain: At acceptable level on current regimen. Denies  Skin: No new deficits noted.  LDA's: PICC saline locked.     Plan: Continue with POC. Notify primary team with changes. Waiting for Stool sample. Poss discharge tomorrow.

## 2019-06-30 NOTE — PROVIDER NOTIFICATION
Time of notification: 10:46 AM  Provider notified: Jefferson Winston   Patient status: pt Right hand swollen is getting large , she denied pain or discomfort. PICC line has good blood return and infusing.   Temp:  [97.3  F (36.3  C)-98.4  F (36.9  C)] 98.4  F (36.9  C)  Pulse:  [66-77] 71  Heart Rate:  [62-94] 74  Resp:  [14-18] 18  BP: (140-155)/(65-80) 155/80  SpO2:  [96 %-100 %] 100 %  Orders received: US ordered . Will continue to monitor.

## 2019-06-30 NOTE — PLAN OF CARE
Neuro: A&Ox4.   Cardiac: SR. VSS.   Respiratory: Sating 98% on RA.  GI/: Adequate urine output. BM X1 , unable to get sample as pt flushed it.   Diet/appetite: Tolerating regular diet. Eating well.  Activity: stand by assist up to chair and in halls.  Pain: At acceptable level on current regimen.   Skin: No new deficits noted.  LDA's: PICC patent . Right arm swollen , US done.  +CMS , denied pain or discomfort.   Plan: transferred to to  . Pt verbalized understanding the need for transfer. Report given to receiving RN  and all her belongings sent with pt. Continue with POC. Notify primary team with changes.

## 2019-06-30 NOTE — PLAN OF CARE
Cared for patient from [id number] - no acute issues. 1800 hemoglobin check down to 8.3 (was previously 8.8) - next  check ordered at midnight. Protonix continues to infuse. LR at 100 ml/hr as not taking in adequate liquids orally quite yet but tolerating diet - no GI symptoms after eating or at all. No BM this shift - next RN aware of need for stool collection at next bowel movement. Very pleasant, full assessment and cares as documented. Report given to oncoming RN at bedside who will assume care over the night shift.

## 2019-07-01 ENCOUNTER — APPOINTMENT (OUTPATIENT)
Dept: OCCUPATIONAL THERAPY | Facility: CLINIC | Age: 74
DRG: 378 | End: 2019-07-01
Payer: MEDICARE

## 2019-07-01 VITALS
OXYGEN SATURATION: 98 % | DIASTOLIC BLOOD PRESSURE: 66 MMHG | TEMPERATURE: 98.2 F | HEART RATE: 71 BPM | BODY MASS INDEX: 30.85 KG/M2 | WEIGHT: 174.16 LBS | SYSTOLIC BLOOD PRESSURE: 149 MMHG | RESPIRATION RATE: 18 BRPM

## 2019-07-01 LAB
ANION GAP SERPL CALCULATED.3IONS-SCNC: 2 MMOL/L (ref 3–14)
BASOPHILS # BLD AUTO: 0 10E9/L (ref 0–0.2)
BASOPHILS NFR BLD AUTO: 0 %
BUN SERPL-MCNC: 15 MG/DL (ref 7–30)
CALCIUM SERPL-MCNC: 7.4 MG/DL (ref 8.5–10.1)
CHLORIDE SERPL-SCNC: 111 MMOL/L (ref 94–109)
CO2 SERPL-SCNC: 28 MMOL/L (ref 20–32)
CREAT SERPL-MCNC: 0.89 MG/DL (ref 0.52–1.04)
DIFFERENTIAL METHOD BLD: ABNORMAL
EOSINOPHIL # BLD AUTO: 0 10E9/L (ref 0–0.7)
EOSINOPHIL NFR BLD AUTO: 0.3 %
ERYTHROCYTE [DISTWIDTH] IN BLOOD BY AUTOMATED COUNT: 17.2 % (ref 10–15)
GFR SERPL CREATININE-BSD FRML MDRD: 63 ML/MIN/{1.73_M2}
GLUCOSE SERPL-MCNC: 88 MG/DL (ref 70–99)
H PYLORI AG STL QL IA: ABNORMAL
HCT VFR BLD AUTO: 28 % (ref 35–47)
HGB BLD-MCNC: 8.1 G/DL (ref 11.7–15.7)
HGB BLD-MCNC: 8.3 G/DL (ref 11.7–15.7)
IMM GRANULOCYTES # BLD: 0.1 10E9/L (ref 0–0.4)
IMM GRANULOCYTES NFR BLD: 1.2 %
LAB SCANNED RESULT: NORMAL
LYMPHOCYTES # BLD AUTO: 1 10E9/L (ref 0.8–5.3)
LYMPHOCYTES NFR BLD AUTO: 10.2 %
MCH RBC QN AUTO: 30.6 PG (ref 26.5–33)
MCHC RBC AUTO-ENTMCNC: 29.6 G/DL (ref 31.5–36.5)
MCV RBC AUTO: 103 FL (ref 78–100)
MONOCYTES # BLD AUTO: 0.5 10E9/L (ref 0–1.3)
MONOCYTES NFR BLD AUTO: 5.6 %
NEUTROPHILS # BLD AUTO: 7.8 10E9/L (ref 1.6–8.3)
NEUTROPHILS NFR BLD AUTO: 82.7 %
NRBC # BLD AUTO: 0 10*3/UL
NRBC BLD AUTO-RTO: 0 /100
PLATELET # BLD AUTO: 213 10E9/L (ref 150–450)
POTASSIUM SERPL-SCNC: 3.7 MMOL/L (ref 3.4–5.3)
RBC # BLD AUTO: 2.71 10E12/L (ref 3.8–5.2)
SODIUM SERPL-SCNC: 142 MMOL/L (ref 133–144)
SPECIMEN SOURCE: ABNORMAL
WBC # BLD AUTO: 9.5 10E9/L (ref 4–11)

## 2019-07-01 PROCEDURE — 25000132 ZZH RX MED GY IP 250 OP 250 PS 637: Mod: GY | Performed by: INTERNAL MEDICINE

## 2019-07-01 PROCEDURE — 97530 THERAPEUTIC ACTIVITIES: CPT | Mod: GO

## 2019-07-01 PROCEDURE — 25000132 ZZH RX MED GY IP 250 OP 250 PS 637: Mod: GY | Performed by: PHYSICIAN ASSISTANT

## 2019-07-01 PROCEDURE — 80048 BASIC METABOLIC PNL TOTAL CA: CPT | Performed by: INTERNAL MEDICINE

## 2019-07-01 PROCEDURE — 85025 COMPLETE CBC W/AUTO DIFF WBC: CPT | Performed by: INTERNAL MEDICINE

## 2019-07-01 PROCEDURE — 25000131 ZZH RX MED GY IP 250 OP 636 PS 637: Mod: GY | Performed by: INTERNAL MEDICINE

## 2019-07-01 PROCEDURE — 40000558 ZZH STATISTIC CVC DRESSING CHANGE

## 2019-07-01 PROCEDURE — 87338 HPYLORI STOOL AG IA: CPT | Performed by: INTERNAL MEDICINE

## 2019-07-01 PROCEDURE — 97110 THERAPEUTIC EXERCISES: CPT | Mod: GO

## 2019-07-01 PROCEDURE — 40000802 ZZH SITE CHECK

## 2019-07-01 PROCEDURE — 36592 COLLECT BLOOD FROM PICC: CPT | Performed by: INTERNAL MEDICINE

## 2019-07-01 PROCEDURE — 25000128 H RX IP 250 OP 636: Performed by: INTERNAL MEDICINE

## 2019-07-01 PROCEDURE — 99239 HOSP IP/OBS DSCHRG MGMT >30: CPT | Performed by: INTERNAL MEDICINE

## 2019-07-01 PROCEDURE — 85018 HEMOGLOBIN: CPT | Performed by: PHYSICIAN ASSISTANT

## 2019-07-01 RX ORDER — PANTOPRAZOLE SODIUM 40 MG/1
40 TABLET, DELAYED RELEASE ORAL
Qty: 112 TABLET | Refills: 0 | Status: SHIPPED | OUTPATIENT
Start: 2019-07-01 | End: 2019-09-26

## 2019-07-01 RX ORDER — PREDNISONE 20 MG/1
40 TABLET ORAL DAILY
Qty: 28 TABLET | Refills: 0 | Status: SHIPPED | OUTPATIENT
Start: 2019-07-01 | End: 2019-07-26

## 2019-07-01 RX ORDER — DIFLUPREDNATE OPHTHALMIC 0.5 MG/ML
1 EMULSION OPHTHALMIC 2 TIMES DAILY
Qty: 1 BOTTLE | Refills: 11 | Status: SHIPPED | OUTPATIENT
Start: 2019-07-01 | End: 2019-09-26

## 2019-07-01 RX ORDER — CIPROFLOXACIN 500 MG/1
500 TABLET, FILM COATED ORAL EVERY 12 HOURS
Qty: 20 TABLET | Refills: 0 | Status: SHIPPED | OUTPATIENT
Start: 2019-07-01 | End: 2019-07-31

## 2019-07-01 RX ORDER — METRONIDAZOLE 500 MG/1
500 TABLET ORAL EVERY 8 HOURS SCHEDULED
Status: DISCONTINUED | OUTPATIENT
Start: 2019-07-01 | End: 2019-07-01 | Stop reason: HOSPADM

## 2019-07-01 RX ORDER — METRONIDAZOLE 500 MG/1
500 TABLET ORAL EVERY 8 HOURS
Qty: 30 TABLET | Refills: 0 | Status: SHIPPED | OUTPATIENT
Start: 2019-07-01 | End: 2019-07-31

## 2019-07-01 RX ORDER — CIPROFLOXACIN 500 MG/1
500 TABLET, FILM COATED ORAL EVERY 12 HOURS
Status: DISCONTINUED | OUTPATIENT
Start: 2019-07-01 | End: 2019-07-01 | Stop reason: HOSPADM

## 2019-07-01 RX ADMIN — PREDNISONE 40 MG: 20 TABLET ORAL at 08:05

## 2019-07-01 RX ADMIN — OYSTER SHELL CALCIUM WITH VITAMIN D 1 TABLET: 500; 200 TABLET, FILM COATED ORAL at 08:04

## 2019-07-01 RX ADMIN — NEOMYCIN SULFATE, POLYMYXIN B SULFATE AND DEXAMETHASONE 1 DROP: 3.5; 10000; 1 SUSPENSION OPHTHALMIC at 08:05

## 2019-07-01 RX ADMIN — NEOMYCIN SULFATE, POLYMYXIN B SULFATE AND DEXAMETHASONE 1 DROP: 3.5; 10000; 1 SUSPENSION OPHTHALMIC at 11:50

## 2019-07-01 RX ADMIN — PIPERACILLIN AND TAZOBACTAM 3.38 G: 3; .375 INJECTION, POWDER, FOR SOLUTION INTRAVENOUS at 06:11

## 2019-07-01 RX ADMIN — ATROPINE SULFATE 1 DROP: 10 SOLUTION/ DROPS OPHTHALMIC at 08:05

## 2019-07-01 RX ADMIN — CIPROFLOXACIN HYDROCHLORIDE 500 MG: 500 TABLET, FILM COATED ORAL at 09:47

## 2019-07-01 RX ADMIN — METRONIDAZOLE 500 MG: 500 TABLET ORAL at 13:48

## 2019-07-01 RX ADMIN — ISONIAZID 300 MG: 300 TABLET ORAL at 08:05

## 2019-07-01 RX ADMIN — PANTOPRAZOLE SODIUM 40 MG: 40 TABLET, DELAYED RELEASE ORAL at 08:05

## 2019-07-01 RX ADMIN — Medication 25 MG: at 08:05

## 2019-07-01 RX ADMIN — METOPROLOL SUCCINATE 25 MG: 25 TABLET, EXTENDED RELEASE ORAL at 08:05

## 2019-07-01 RX ADMIN — PREDNISOLONE ACETATE 1 DROP: 10 SUSPENSION/ DROPS OPHTHALMIC at 08:05

## 2019-07-01 ASSESSMENT — VISUAL ACUITY: OU: BLURRED VISION

## 2019-07-01 ASSESSMENT — ACTIVITIES OF DAILY LIVING (ADL)
ADLS_ACUITY_SCORE: 16

## 2019-07-01 NOTE — DISCHARGE SUMMARY
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 internal medicine 6/26/19 with melena and hgb 6.9     # Acute blood loss anemia, GI bleed  # Possible small bowel diverticulitis, and perforation on CT Abdomen 06/27: PTA 4 days melena, dizziness, diffuse abdominal discomfort. Found to have hgb 6.3 in clinic and stent to ED. Hgb 6.9 in ED, treated with 1U RBC and IV PPI. WBC 14.4. Trop x1 negative. Denies etoh use. Etiology concerning GIB 2/2 recent prednisone rx and NSAID use. She was started on Pantoprazole drip.  Case was discussed with GI, and Surgery on 06/27.  She was kept NPO, and given IV fluids, and IV Zosyn. Hemoglobin, and abdominal exam were monitored closely. As she improved, diet was advanced as tolerated to low residue (fiber diet). Per GI, okay to use Prednisone if clinically indicated.   Hg 6.8 on 06/29, and she was given  1 unit PRBC.   On the day of discharge, patient is tolerating diet, without abdominal pain. No nausea, vomiting. She is afebrile. No blood in stool.  Case discussed with GI, and Surgery team  on day of discharge  - Complete 14 day course with Cipro, and Flagyl (10 days more)   - Hold NSAIDs  - H Pylori antigen collected result pending. Follow up with primary care  - Low residue diet.   - Oral PPI for at least 8 weeks, and then once daily PPI thereafter  - EGD in 8 weeks after discharge  - Follow up with Ggeneral surgery as an outpatient prior to her endoscopy to ensure her small bowel diverticulitis has improved.      # CKD III: BL Cr around 0.9. Elevated to 1.05 in the setting of GIB and poor oral intake. 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 +T pallidum and +trepomena antibodies despite NR anti-treponema RPP. Plan was to treated with PCN with plans for 14 day course (through 6/21), however, appears as if TCU continued PCN through at least 6/25. Now s/p vitrectomy, lensectomy, etc  Discussed with Ophthalmology on 06/27. Advised to continue following treatment.   Seen in Eye clinic on 06/28. Prednisone dose was reduced to 40 mg, and Pred forte was reduced to BID.   - Continue Thornton Shield at all times   - Continue Atropine, maxitrol suspension  -  Pred Forte  BID  -  Prednisone  40 mg daily   - Follow up in 1-2 weeks with Dr. Garza and 1 month with uveitis clinic     # Latent TB: +Quant gold 6/6/19. CXR 6/6 no acute findings. Asymptomatic. Brother with h/o TB. LFts normal   - Continue Isoniazid      # HTN: PTA on metoprolol. BP stable on admission  - Start Metoprolol        Diet: Orders Placed This Encounter      Advance Diet as Tolerated: Low residue diet     DVT Prophylaxis: Pneumatic Compression Devices  Cronin Catheter: not present  Code Status: DNR/DNI        Disposition Plan   Home                       Discharge Medications:     Current Discharge Medication List      START taking  these medications    Details   ciprofloxacin (CIPRO) 500 MG tablet Take 1 tablet (500 mg) by mouth every 12 hours for 10 days  Qty: 20 tablet, Refills: 0    Associated Diagnoses: Gastrointestinal hemorrhage associated with intestinal diverticulitis      metroNIDAZOLE (FLAGYL) 500 MG tablet Take 1 tablet (500 mg) by mouth every 8 hours for 10 days  Qty: 30 tablet, Refills: 0    Associated Diagnoses: Gastrointestinal hemorrhage associated with intestinal diverticulitis      pantoprazole (PROTONIX) 40 MG EC tablet Take 1 tablet (40 mg) by mouth 2 times daily (before meals)  Qty: 112 tablet, Refills: 0    Associated Diagnoses: Gastrointestinal hemorrhage, unspecified gastrointestinal hemorrhage type         CONTINUE these medications which have CHANGED    Details   difluprednate (DUREZOL) 0.05 % ophthalmic emulsion Place 1 drop Into the left eye 2 times daily  Qty: 1 Bottle, Refills: 11    Associated Diagnoses: Panuveitis of left eye      predniSONE (DELTASONE) 20 MG tablet Take 2 tablets (40 mg) by mouth daily  Qty: 28 tablet, Refills: 0    Associated Diagnoses: Uveitis of left eye         CONTINUE these medications which have NOT CHANGED    Details   atropine 1 % ophthalmic solution Place 1 drop Into the left eye daily      isoniazid (NYDRAZID) 300 MG tablet Take 1 tablet (300 mg) by mouth daily  Qty: 90 tablet, Refills: 2    Associated Diagnoses: TB lung, latent      metoprolol succinate ER (TOPROL-XL) 25 MG 24 hr tablet Take 1 tablet (25 mg) by mouth daily  Qty: 90 tablet, Refills: 1    Associated Diagnoses: HTN, goal below 140/90      neomycin-polymixin-dexamethasone (MAXITROL) 0.1 % ophthalmic suspension Apply 1 drop to eye 4 times daily Instill into operative eye(s) per physician instructions.  Qty: 5 mL, Refills: 0    Associated Diagnoses: Recent retinal detachment of left eye, total/subtotal; Aftercare following surgery of a sense organ      neomycin-polymyxin-dexamethasone (MAXITROL) 3.5-04182-6.1 ophthalmic  ointment Place 0.5 inches Into the left eye At Bedtime      vitamin B6 (PYRIDOXINE) 25 MG tablet Take 1 tablet (25 mg) by mouth daily  Qty: 30 tablet, Refills: 0    Associated Diagnoses: TB lung, latent      Blood Pressure Monitoring (BLOOD PRESSURE KIT) KIT 1 kit as needed  Qty: 1 kit, Refills: 0    Associated Diagnoses: HTN, goal below 140/90      calcium-vitamin D (OSCAL) 250-125 MG-UNIT TABS per tablet Take 1 tablet by mouth 2 times daily         STOP taking these medications       aspirin 81 MG EC tablet Comments:   Reason for Stopping:         cyclopentolate (CYCLOGYL) 1 % ophthalmic solution Comments:   Reason for Stopping:         diphenhydrAMINE (BENADRYL) 25 MG capsule Comments:   Reason for Stopping:         EPINEPHrine (EPIPEN 2-ANJU) 0.3 MG/0.3ML injection 2-pack Comments:   Reason for Stopping:         indomethacin (INDOCIN) 25 MG capsule Comments:   Reason for Stopping:         omeprazole (PRILOSEC OTC) 20 MG EC tablet Comments:   Reason for Stopping:         penicillin G potassium 1 Million Units Comments:   Reason for Stopping:         vitamin C (ASCORBIC ACID) 500 MG tablet Comments:   Reason for Stopping:                   Procedures/Consultations:   No procedures performed during this admission  Consultation during this admission received from GI, Surgery, and Ophthalmology           Final Day of Progress before Discharge:     Reports doing well  No nausea, vomiting  Had BM in AM, with no blood in stool  Tolerating diet well  No abdominal pain  No lightheadedness or dizzizness.     Physical Exam:  Blood pressure 149/66, pulse 71, temperature 98.2  F (36.8  C), temperature source Oral, resp. rate 18, weight 79 kg (174 lb 2.6 oz), SpO2 98 %, not currently breastfeeding.      HEENT: No icterus, no pallor  Cardiovascular: S1, S2 normal  Respiratory:  B/L CTA  GI/Abdomen: Soft, NT, BS+  Neurology: Alert, awake,and oriented. No tremors.   Extremities: No pretibial edema    Data:  All laboratory data  reviewed             Condition on Discharge:   Discharge condition: Stable   Code status on discharge: Full Code                Discharge Disposition:   Discharged to home               Pending Results:   Unresulted Labs Ordered in the Past 30 Days of this Admission     Date and Time Order Name Status Description    6/17/2019 1122 Fungus Culture, non-blood Preliminary     6/17/2019 1120 AFB Culture Non Blood Preliminary     6/6/2019 1400 TREPONEMA PALLIDUM ANTIBODY CONFIRM In process                   Discharge Instructions and Follow-Up:     Discharge Procedure Orders   Reason for your hospital stay   Order Comments: Admitted for melena, and anemia     Adult [facility] Follow-up and recommended labs and tests   Order Comments: Follow up with primary care provider, [facility], within 7 days for hospital follow- up.  The following labs/tests are recommended: CBC, BMP.   Follow up with Surgery in 2-4 weeks  Follow up with GI for Endoscopy in 8 weeks      Appointments on [facility] and/or [facility] (with [facility] or [facility] provider or service). Call [phone] if you haven't heard regarding these appointments within 7 days of discharge.     Activity   Order Comments: Your activity upon discharge: activity as tolerated     Order Specific Question Answer Comments   Is discharge order? Yes      Monitor and record   Order Comments: Watch/monitor for fever, chill, abdominal pain, lightheadedness, dizziness, dark stool, blood in stool   If these symptoms occurs, please call your primary care or come to ED     Full Code     Order Specific Question Answer Comments   Code status determined by: Discussion with patient/legal decision maker      Diet   Order Comments: Follow this diet upon discharge: Low residue diet     Order Specific Question Answer Comments   Is discharge order? Yes           Attestation:  Jefferson Lopez.    Time spent on patient: 40 minutes total including face to face and coordinating care time  reviewing current illness, any medication changes, and the care plan for today.

## 2019-07-01 NOTE — PLAN OF CARE
1900-0730: VSS on RA. A&Ox4, L) eye blurry vision (not new). No evidence of blood in stools, voiding adequate amounts. Need H. Pylori stool sample to be collected. Trending Hgb q 6 hours, last check 8.1. IV zosyn for diverticulitis and Isoniazid for latent TB. Up SBA with cane. Tolerating diet, denies nausea. Denies pain. BPs on R) lower arm only (hx. Breast cancer). R) DL PICC SL.  Patient hopeful to discharge today after stool sample sent. Will be going home to stay with daughter who will provide cares.     H.Pylori stool sent this AM.

## 2019-07-01 NOTE — PROGRESS NOTES
Pt discharging home with daughter. All belongings sent home with pt. Pt was brought to discharge pharmacy via wheelchair and to the main lobby. Pts PICC was removed. Pt encouraged to attend follow up appointments.

## 2019-07-01 NOTE — PLAN OF CARE
Status: Pt admitted w/ acute blood loss anemia, GI bleed, latent TB and HTN   Vitals: VSS on RA, BP on R lower extremity only   Neuros: Alert & oriented x4, denies N/T, blurry vision in L eye   IV: R DL PICC SL  Resp/trach: Denies SOB  Diet: Regular diet, adequate intake   Bowel status: BS+, pt had stool sample sent to lab, positive for Helicobacter pylori antigen   : Voiding spontaneously without difficulty   Skin: Intact   Pain: Denies  Activity: SBA w/cane  Plan: IV medication switched to PO, discharging home with daughter, continue to monitor and follow POC

## 2019-07-01 NOTE — PLAN OF CARE
OT/7B - Discharge Planner OT   Patient plan for discharge: home with daughter, hopefully today  Current status: Facilitated functional mobility ~175 feet with cane and SBA, working to increase activity tolerance needed for ADL/IADL. Facilitated 2 sets x5 STS from EOB for increased LE strength. SpO2 % on RA.  Barriers to return to prior living situation: activity tolerance  Recommendations for discharge: home with assist from daughter and OP PT   Rationale for recommendations: Pt may benefit  from OP PT to address higher level balance deficits and activity tolerance/endurance.       Entered by: Usha Barrera 07/01/2019 8:23 AM

## 2019-07-02 ENCOUNTER — PATIENT OUTREACH (OUTPATIENT)
Dept: CARE COORDINATION | Facility: CLINIC | Age: 74
End: 2019-07-02

## 2019-07-02 NOTE — PLAN OF CARE
Occupational Therapy Discharge Summary    Reason for therapy discharge:    Discharged to home.    Progress towards therapy goal(s). See goals on Care Plan in [facility] electronic health record for goal details.  Goals partially met.  Barriers to achieving goals:   discharge from facility.    Therapy recommendation(s):    Continued therapy is recommended.  Rationale/Recommendations:  Pt would benefit from further therapy improve functional strength and endurance necessary for ADLs and IADLs.

## 2019-07-02 NOTE — PROGRESS NOTES
[facility]: Post-Discharge Note  SITUATION                                                      Admission:    Admission Date: 06/26/19   Reason for Admission: Acute blood loss anemia, GI bleed  Discharge:   Discharge Date: 07/01/19  Discharge Diagnosis: Acute blood loss anemia, GI bleed  Discharge Service: Medicine     BACKGROUND                                                      Mary Carlos is a 74 year old female with history of HTN, breast cancer s/p masectomy, and neurosyphilis who was admitted to [facility] internal medicine 6/26/19 with melena and hgb 6.9     Patient started to notice dark and black stools around Friday. Since then, she has been more tired and weak. She has not passed out but has felt like she might. Per daughter, may have been repeating herself over the weekend but was not overtly confused. Currently denies any confusion. No chest pain or dyspnea. Some dry heaving but no hematemesis or coffee-ground emesis. Is unaware of and hematochezia. Has some diffuse abdominal cramping. No fever or chills. Denies URI symptoms. No new rash. No urinary symptoms. Stool is consisently loose but not diarrhea. Compliant with PTA prescribed meds. Per TCU MAR, was taking scheduled prednisone and indomethacin. No other NSAIDs. Denies etoh use    ASSESSMENT      Discharge Assessment  Patient reports symptoms are: Improved  Does the patient have all of their medications?: Yes  Does patient know what their new medications are for?: Yes  Does patient have a follow-up appointment scheduled?: Yes  Does patient have any other questions or concerns?: No    Post-op  Did the patient have surgery or a procedure: No  Fever: No  Chills: No  Eating & Drinking: eating and drinking without complaints/concerns  PO Intake: regular diet  Bowel Function: normal  Urinary Status: voiding without complaint/concerns    PLAN                                                      Outpatient Plan:      Follow up with primary  care provider, [facility], within 7 days for hospital follow- up.  The following labs/tests are recommended: CBC, BMP.   Follow up with Surgery in 2-4 weeks  Follow up with GI for Endoscopy in 8 weeks    Future Appointments   Date Time Provider Department Center   7/11/2019 12:45 PM Carolee Garza MD [facility]   7/26/2019  1:15 PM Katie Young MD [facility]           Edith Claros, [facility]

## 2019-07-15 LAB
FUNGUS SPEC CULT: NORMAL
SPECIMEN SOURCE: NORMAL

## 2019-07-26 ENCOUNTER — OFFICE VISIT (OUTPATIENT)
Dept: OPHTHALMOLOGY | Facility: CLINIC | Age: 74
End: 2019-07-26
Attending: OPHTHALMOLOGY
Payer: MEDICARE

## 2019-07-26 DIAGNOSIS — H20.9 UVEITIS OF LEFT EYE: ICD-10-CM

## 2019-07-26 DIAGNOSIS — H44.112 PANUVEITIS OF LEFT EYE: Primary | ICD-10-CM

## 2019-07-26 DIAGNOSIS — A50.31: ICD-10-CM

## 2019-07-26 PROCEDURE — 92250 FUNDUS PHOTOGRAPHY W/I&R: CPT | Mod: ZF | Performed by: OPHTHALMOLOGY

## 2019-07-26 PROCEDURE — G0463 HOSPITAL OUTPT CLINIC VISIT: HCPCS | Mod: ZF

## 2019-07-26 PROCEDURE — 92285 EXTERNAL OCULAR PHOTOGRAPHY: CPT | Mod: ZF | Performed by: OPHTHALMOLOGY

## 2019-07-26 RX ORDER — PREDNISONE 20 MG/1
20 TABLET ORAL DAILY
Qty: 90 TABLET | Refills: 1 | Status: SHIPPED | OUTPATIENT
Start: 2019-07-26

## 2019-07-26 RX ORDER — PREDNISOLONE ACETATE 10 MG/ML
1 SUSPENSION/ DROPS OPHTHALMIC 4 TIMES DAILY
Status: DISCONTINUED | OUTPATIENT
Start: 2019-07-26 | End: 2019-07-26

## 2019-07-26 RX ORDER — PREDNISOLONE ACETATE 10 MG/ML
1-2 SUSPENSION/ DROPS OPHTHALMIC 4 TIMES DAILY
Qty: 1 BOTTLE | Refills: 3 | Status: SHIPPED | OUTPATIENT
Start: 2019-07-26 | End: 2020-01-15

## 2019-07-26 RX ORDER — AMOXICILLIN 500 MG/1
2 CAPSULE ORAL DAILY
Refills: 0 | COMMUNITY
Start: 2019-07-18 | End: 2019-09-05

## 2019-07-26 ASSESSMENT — VISUAL ACUITY
OD_PH_SC: 20/70
OD_SC: 20/200
METHOD: SNELLEN - LINEAR
OS_SC: CF @ 2FT
OD_PH_SC+: -1

## 2019-07-26 ASSESSMENT — CONF VISUAL FIELD
METHOD: COUNTING FINGERS
OS_INFERIOR_NASAL_RESTRICTION: 1
OS_SUPERIOR_TEMPORAL_RESTRICTION: 1
OS_SUPERIOR_NASAL_RESTRICTION: 1
OS_INFERIOR_TEMPORAL_RESTRICTION: 3
OD_NORMAL: 1

## 2019-07-26 ASSESSMENT — EXTERNAL EXAM - LEFT EYE: OS_EXAM: NORMAL

## 2019-07-26 ASSESSMENT — SLIT LAMP EXAM - LIDS
COMMENTS: NORMAL
COMMENTS: PROTECTIVE PTOSIS

## 2019-07-26 ASSESSMENT — EXTERNAL EXAM - RIGHT EYE: OD_EXAM: NORMAL

## 2019-07-26 ASSESSMENT — TONOMETRY
IOP_METHOD: ICARE
OD_IOP_MMHG: 13
OS_IOP_MMHG: 5

## 2019-07-26 NOTE — PROGRESS NOTES
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 Today she reports that vision is still fuzzy and about the same. No eye pain or redness. Refuses fluorescein angiography, states \"last time it almost killed me\"; she reports that she went into shock. She stopped oral prednisone about 4-5 days ago because she ran out (was taking 40 mg/day).    Current ocular medications: PredForte left eye four times a day.  Prior ocular medications: oral indomethacin (stopped for GI bleed), oral prednisone (max dose 60 mg/day, stopped at 40 mg/day ~7.21.19), Maxitrol, atropine.    Ocular history:   1. 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   - Prior to evaluation at [facility] eye clinic 6.6.19, she had been followed by Dr. Conteh for about 2 years with waxing and waning left > right eye redness and pain. She had been diagnosed with scleritis both eyes and was treated with topical CsA, topical prednisolone, and topical NSAIDS. Her corneal changes both eyes initially were presumed secondary to ocular rosacea but in March 2019 additional vascular ingrowth was noted, so patient was referred to Dr. Almodovar (rheum) for scleritis workup. Dr. Almodovar referred her to [facility] eye clinic for additional scleritis evaluation, since in May 2019 vision left eye (previously her better seeing eye) was markedly decreased.   - March 2019 near vision was 20/100 right eye and 20/30 left eye, per outside notes   - 6.6.19 exam her left eye was noted to have scleral injection, AC cell/flare, posterior synechiae, and vitritis on B-Scan. Both eyes were noted to have peripheral corneal haze with KNV. Work-up revealed positive Treponemal antibodies (+ x2), + Quant-Tb with negative chest x-ray, as well as additional elevated inflammatory markers. She was treated for IV penicillin for presumed ocular syphilis.  2. 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  Intraop findings:   - subtotal retinal detachment with peripheral areas of ischemia   - underlying choroidals   - superior retinal tear   - anterior proliferative vitreoretinopathy  3. Patient reports a history of \"glaucoma surgery in both eyes\" 30-35 years ago in [place], [place] (further details unknown).  4. Cataract right eye  5. Pseudophakia left eye    Medical history:  1. Admitted 2 days ago for acute blood loss anemia related to melena, found to have Hgb 6.3 in clinic and admitted for evaluation and treatment, being managed conservatively.  2. HTN  3. CKD stage III  4. Breast cancer s/p mastectomy (?2012)  5. Cholecystectomy (prior to 1995)    Review of systems: Positive for hearing loss, easy bruising, recurrent diarrhea, and bloody stools. Negative for fatigue, weight loss, fevers, chills, night sweats, frequent headaches, seizure, fainting, numbness/tingling, weakness, oral ulcers, genital ulcers, recurrent nosebleeds, sinusitis, tinnitus, chronic cough, chest pain, shortness of breath, skin rash, tick bite,  easy bleeding, joint pain/stiffness, back pain, and bloody urine.    Social history: She is a former smoker (quit in 2011). Denies alcohol and IV drug use. 2 dogs and 1 cat for pets. She has never lived outside the [place].    Laboratory/Imaging Results:  6.17.19 left vitreous biopsy: cultures negative to date, additional testing (unsure of details) pending  6.6.19 chest x-ray and RPR both normal/negative. Quantiferon-Tb positive (0.5-0.6). Treponema Ab and TP-PA both positive.  Outside rheum testing: +Anti-nuclear antibody 2010, +SSA and SSB, +HLAB27, +RF (IgA)    Ocular Imaging:  Macular OCT " 7.26.19:  Right eye: normal foveal contour, retinal layers intact, no intraretinal or subretinal fluid, stable.  Left eye: outer retinal atrophy/attenuation, new subretinal fluid     Retinal nerve fiber layer OCT 6.28.19:  Right eye: borderline TI otherwise normal  Left eye: unreliable tracing    Impression/Plan:  1. Recurrent retinal detachment left eye (barely mac on), 6 weeks s/p vitreous biopsy/lensectomy/RD repair with silicone oil placement left eye. Patient missed her recent scheduled post-op appointment with Dr. Garza. Possibly rhegmatogenous given the thin, atrophic retina with retinal hole, versus exudative 2/2 to abruptly stopping oral prednisone.   - Restart oral prednisone at 20 mg/day   - Follow up with Dr. Garza, patient scheduled for July 31st with her but will directly discuss timing with Dr. Campo   - If additional surgery left eye is planned, I recommend increasing oral prednisone to 40 mg/day 1-2 days prior to surgery and for at least 1 week post-operatively  2. Scleritis and panuveitis left eye. Presumed syphilitic given positive syphilis testing (serum); now s/p IV PCN treatment. Differential diagnosis includes Tb, manifestation of systemic autoimmune disease, idiopathic. There is mild scleritis activity left eye today (patient abruptly self-stopped oral prednisone earlier this week).   - Agree with remaining off oral NSAIDs given GI bleed   - Restart oral prednisone at 20 mg/day   - Continue Predforte both eyes four times a day    - Scleral/slit lamp photos today to document exam  3. 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 Likely related to scleritis, will watch for PUK-like picture.   - Restart oral prednisone, as above  4. Positive Quantiferon-Tb.   - Continue latent Tb treatment, started 6.11.19.   - Ocular Tb is  less likely but remains a possibility. In the future, if scleritis/uveitis flares and cannot be controlled with corticosteroids, could consider active Tb treatment.    Follow up within the next week with Dr. Garza and in 1 month with uveitis clinic, V/T/D/mac OCT      Attending Physician Attestation:  Complete documentation of historical and exam elements from today's encounter can be found in the full encounter summary report (not reduplicated in this progress note).  I personally obtained the chief complaint(s) and history of present illness.  I confirmed and edited as necessary the review of systems, past medical/surgical history, family history, social history, and examination findings as documented by others; and I examined the patient myself.  I personally reviewed the relevant tests, images, and reports as documented above.  I formulated and edited as necessary the assessment and plan and discussed the findings and management plan with the patient and family.  - Katie Young M.D.

## 2019-07-26 NOTE — NURSING NOTE
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              Comments     Pt denies any significant vision changes in either eye since last visit.  Denies any pain, pressure, irritation, discharge, and tearing.  Currently on maxitrol left eye four times a day, Prednisolone left eye four times a day, and Atropine left eye daily.     Alanna Grover OT 1:31 PM July 26, 2019

## 2019-07-31 ENCOUNTER — OFFICE VISIT (OUTPATIENT)
Dept: OPHTHALMOLOGY | Facility: CLINIC | Age: 74
End: 2019-07-31
Attending: OPHTHALMOLOGY
Payer: MEDICARE

## 2019-07-31 DIAGNOSIS — H33.052 OLD TOTAL RETINAL DETACHMENT OF LEFT EYE: Primary | ICD-10-CM

## 2019-07-31 DIAGNOSIS — Z98.890 POST-OPERATIVE STATE: ICD-10-CM

## 2019-07-31 DIAGNOSIS — H44.112 PANUVEITIS OF LEFT EYE: ICD-10-CM

## 2019-07-31 DIAGNOSIS — A50.31: ICD-10-CM

## 2019-07-31 PROCEDURE — 92134 CPTRZ OPH DX IMG PST SGM RTA: CPT | Mod: ZF | Performed by: OPHTHALMOLOGY

## 2019-07-31 PROCEDURE — G0463 HOSPITAL OUTPT CLINIC VISIT: HCPCS | Mod: ZF

## 2019-07-31 ASSESSMENT — CONF VISUAL FIELD
OS_INFERIOR_TEMPORAL_RESTRICTION: 3
OS_SUPERIOR_NASAL_RESTRICTION: 3
OS_INFERIOR_NASAL_RESTRICTION: 3
OD_NORMAL: 1
OS_SUPERIOR_TEMPORAL_RESTRICTION: 1

## 2019-07-31 ASSESSMENT — TONOMETRY
OS_IOP_MMHG: 1
OD_IOP_MMHG: 10
IOP_METHOD: APPLANATION

## 2019-07-31 ASSESSMENT — SLIT LAMP EXAM - LIDS
COMMENTS: PROTECTIVE PTOSIS
COMMENTS: NORMAL

## 2019-07-31 ASSESSMENT — VISUAL ACUITY
OD_SC: 20/200
OS_SC: CF @ 3'
METHOD: SNELLEN - LINEAR
OD_PH_SC: 20/70

## 2019-07-31 ASSESSMENT — EXTERNAL EXAM - RIGHT EYE: OD_EXAM: NORMAL

## 2019-07-31 ASSESSMENT — EXTERNAL EXAM - LEFT EYE: OS_EXAM: NORMAL

## 2019-07-31 NOTE — PROGRESS NOTES
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 Postoperative week 6 status post 25 g Pars plana vitrectomy (PPV)/ Pars plana lensectomy (PPL)/ membrane peel/ endolaser/ posterior synechiolysis/ iris hooks/ retinotomy/ perfluoroctane liquid (PFO)/ air fluid exchange Silicone Oil 1000 cs placement left eye (6/17/2019) for subtotal Retinal detachment  Today she reports that vision is still fuzzy and about the same. No eye pain or redness. She stopped oral prednisone about 4-5 days ago because she ran out (was taking 40 mg/day).    Current ocular medications: PredForte left eye four times a day.  Prior ocular medications: oral indomethacin (stopped for GI bleed), oral prednisone (max dose 60 mg/day, stopped at 40 mg/day ~7.21.19), Maxitrol, atropine.    Optical Coherence Tomography: inferior macula subretinal fluid and inf peripheral Retinal detachment      Assessment and plan   1. Recurrent retinal detachment left eye   s/p PPV/lensectomy/RD repair with silicone oil placement left eye 7/31/19  Is taking PF QID left eye; oral Prednisone 20 mg po renewed by Dr. Young  Macular on; Inferior re detachment extending posteriorly up to inferior vascular arcade    I reviewed the indications, risks, benefits, and alternatives of the proposed surgical procedure including, but not limited to, failure to improve vision or further loss of vision,  and need for additional surgery, bleeding, infection, loss of vision and the remote possibility of complications of anesthesia. 1:1000 risk of infection/bleed/loss of eye; 1:100 risk of RD and need for further surgery. Patient aware of prolonged healing after retinal surgery (up to a year after surgery) as well as possibility of a gas/SO  instillation into eye. Patient agreed to proceed with surgery.  I provided multiple opportunities for the questions, answered all questions  to the best of my ability, and confirmed that my answers and my discussion were understood.     Plan for 25 g PPV + SO removal + membrane peeling; possible retinopexy + endolaser + possible Silicone Oil left eye   1 hour surgery  General anesthesia   Increase prednisone to 40 mg/day 2 days prior to the surgery        2. Scleritis and panuveitis left eye.   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   Differential diagnosis includes Tb, manifestation of systemic autoimmune disease, idiopathic.   continue prednisone at 20 mg/day  Continue Predforte both eyes four times a day     3. 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 Likely related to scleritis, will watch for PUK-like picture.    4. Positive Quantiferon-Tb.   - Continue latent Tb treatment, started 6.11.19.   - Ocular Tb is less likely but remains a possibility. In the future, if scleritis/uveitis flares and cannot be controlled with corticosteroids, could consider active Tb treatment.    Arnie Story MD  Vitreoretinal surgery fellow  [facility]  ~~~~~~~~~~~~~~~~~~~~~~~~~~~~~~~~~~   Complete documentation of historical and exam elements from today's encounter can be found in the full encounter summary report (not reduplicated in this progress note).  I personally obtained the chief complaint(s) and history of present illness.  I confirmed and edited as necessary the review of systems, past medical/surgical history, family history, social history, and examination findings as documented by others; and I examined the patient myself.  I personally reviewed the relevant tests, images, and reports as documented above.  I formulated and edited as necessary the assessment and plan and discussed the findings and management plan with the  patient and family    Carolee Garza MD  .  Retina Service   Department of Ophthalmology and Visual Neurosciences   [facility]  Phone: [phone]   Fax: [phone]

## 2019-08-01 ENCOUNTER — ANESTHESIA EVENT (OUTPATIENT)
Dept: SURGERY | Facility: AMBULATORY SURGERY CENTER | Age: 74
End: 2019-08-01

## 2019-08-01 ENCOUNTER — TELEPHONE (OUTPATIENT)
Dept: OPHTHALMOLOGY | Facility: CLINIC | Age: 74
End: 2019-08-01

## 2019-08-01 RX ORDER — TROPICAMIDE 10 MG/ML
1 SOLUTION/ DROPS OPHTHALMIC
Status: CANCELLED | OUTPATIENT
Start: 2019-08-01

## 2019-08-01 RX ORDER — CYCLOPENTOLATE HYDROCHLORIDE 10 MG/ML
1 SOLUTION/ DROPS OPHTHALMIC
Status: CANCELLED | OUTPATIENT
Start: 2019-08-01

## 2019-08-01 RX ORDER — PHENYLEPHRINE HYDROCHLORIDE 25 MG/ML
1 SOLUTION/ DROPS OPHTHALMIC
Status: CANCELLED | OUTPATIENT
Start: 2019-08-01

## 2019-08-01 NOTE — TELEPHONE ENCOUNTER
Called and spoke with daughter Brook about the change in [place] [facility] [place]:    Friday Aug. 2 with Dr. Garza    Arrival time: 7:30AM     Start time: 9:00AM

## 2019-08-02 ENCOUNTER — ANESTHESIA (OUTPATIENT)
Dept: SURGERY | Facility: AMBULATORY SURGERY CENTER | Age: 74
End: 2019-08-02

## 2019-08-02 ENCOUNTER — HOSPITAL ENCOUNTER (OUTPATIENT)
Facility: AMBULATORY SURGERY CENTER | Age: 74
End: 2019-08-02
Attending: OPHTHALMOLOGY
Payer: MEDICARE

## 2019-08-02 VITALS
SYSTOLIC BLOOD PRESSURE: 142 MMHG | WEIGHT: 162 LBS | HEART RATE: 65 BPM | OXYGEN SATURATION: 96 % | RESPIRATION RATE: 20 BRPM | TEMPERATURE: 98.8 F | DIASTOLIC BLOOD PRESSURE: 78 MMHG | BODY MASS INDEX: 28.7 KG/M2 | HEIGHT: 63 IN

## 2019-08-02 DIAGNOSIS — H33.002 RETINAL DETACHMENT OF LEFT EYE WITH RETINAL BREAK: Primary | ICD-10-CM

## 2019-08-02 DEVICE — IMPLANTABLE DEVICE: Type: IMPLANTABLE DEVICE | Site: EYE | Status: FUNCTIONAL

## 2019-08-02 RX ORDER — ONDANSETRON 2 MG/ML
INJECTION INTRAMUSCULAR; INTRAVENOUS PRN
Status: DISCONTINUED | OUTPATIENT
Start: 2019-08-02 | End: 2019-08-02

## 2019-08-02 RX ORDER — FENTANYL CITRATE 50 UG/ML
INJECTION, SOLUTION INTRAMUSCULAR; INTRAVENOUS PRN
Status: DISCONTINUED | OUTPATIENT
Start: 2019-08-02 | End: 2019-08-02

## 2019-08-02 RX ORDER — TROPICAMIDE 10 MG/ML
1 SOLUTION/ DROPS OPHTHALMIC
Status: COMPLETED | OUTPATIENT
Start: 2019-08-02 | End: 2019-08-02

## 2019-08-02 RX ORDER — ONDANSETRON 2 MG/ML
4 INJECTION INTRAMUSCULAR; INTRAVENOUS EVERY 30 MIN PRN
Status: DISCONTINUED | OUTPATIENT
Start: 2019-08-02 | End: 2019-08-03 | Stop reason: HOSPADM

## 2019-08-02 RX ORDER — ATROPINE SULFATE 10 MG/ML
SOLUTION/ DROPS OPHTHALMIC PRN
Status: DISCONTINUED | OUTPATIENT
Start: 2019-08-02 | End: 2019-08-02 | Stop reason: HOSPADM

## 2019-08-02 RX ORDER — BALANCED SALT SOLUTION 6.4; .75; .48; .3; 3.9; 1.7 MG/ML; MG/ML; MG/ML; MG/ML; MG/ML; MG/ML
SOLUTION OPHTHALMIC PRN
Status: DISCONTINUED | OUTPATIENT
Start: 2019-08-02 | End: 2019-08-02 | Stop reason: HOSPADM

## 2019-08-02 RX ORDER — FENTANYL CITRATE 50 UG/ML
25-50 INJECTION, SOLUTION INTRAMUSCULAR; INTRAVENOUS
Status: DISCONTINUED | OUTPATIENT
Start: 2019-08-02 | End: 2019-08-02 | Stop reason: HOSPADM

## 2019-08-02 RX ORDER — NALOXONE HYDROCHLORIDE 0.4 MG/ML
.1-.4 INJECTION, SOLUTION INTRAMUSCULAR; INTRAVENOUS; SUBCUTANEOUS
Status: DISCONTINUED | OUTPATIENT
Start: 2019-08-02 | End: 2019-08-03 | Stop reason: HOSPADM

## 2019-08-02 RX ORDER — LIDOCAINE HYDROCHLORIDE 20 MG/ML
INJECTION, SOLUTION INFILTRATION; PERINEURAL PRN
Status: DISCONTINUED | OUTPATIENT
Start: 2019-08-02 | End: 2019-08-02

## 2019-08-02 RX ORDER — DEXAMETHASONE SODIUM PHOSPHATE 4 MG/ML
INJECTION, SOLUTION INTRA-ARTICULAR; INTRALESIONAL; INTRAMUSCULAR; INTRAVENOUS; SOFT TISSUE PRN
Status: DISCONTINUED | OUTPATIENT
Start: 2019-08-02 | End: 2019-08-02 | Stop reason: HOSPADM

## 2019-08-02 RX ORDER — PROPOFOL 10 MG/ML
INJECTION, EMULSION INTRAVENOUS PRN
Status: DISCONTINUED | OUTPATIENT
Start: 2019-08-02 | End: 2019-08-02

## 2019-08-02 RX ORDER — NEOMYCIN POLYMYXIN B SULFATES AND DEXAMETHASONE 3.5; 10000; 1 MG/ML; [USP'U]/ML; MG/ML
1 SUSPENSION/ DROPS OPHTHALMIC 4 TIMES DAILY
Qty: 5 ML | Refills: 1 | Status: SHIPPED | OUTPATIENT
Start: 2019-08-02 | End: 2019-09-26

## 2019-08-02 RX ORDER — PROPOFOL 10 MG/ML
INJECTION, EMULSION INTRAVENOUS CONTINUOUS PRN
Status: DISCONTINUED | OUTPATIENT
Start: 2019-08-02 | End: 2019-08-02

## 2019-08-02 RX ORDER — ONDANSETRON 4 MG/1
4 TABLET, ORALLY DISINTEGRATING ORAL EVERY 30 MIN PRN
Status: DISCONTINUED | OUTPATIENT
Start: 2019-08-02 | End: 2019-08-03 | Stop reason: HOSPADM

## 2019-08-02 RX ORDER — PHENYLEPHRINE HYDROCHLORIDE 25 MG/ML
1 SOLUTION/ DROPS OPHTHALMIC
Status: COMPLETED | OUTPATIENT
Start: 2019-08-02 | End: 2019-08-02

## 2019-08-02 RX ORDER — DEXAMETHASONE SODIUM PHOSPHATE 4 MG/ML
INJECTION, SOLUTION INTRA-ARTICULAR; INTRALESIONAL; INTRAMUSCULAR; INTRAVENOUS; SOFT TISSUE PRN
Status: DISCONTINUED | OUTPATIENT
Start: 2019-08-02 | End: 2019-08-02

## 2019-08-02 RX ORDER — SODIUM CHLORIDE, SODIUM LACTATE, POTASSIUM CHLORIDE, CALCIUM CHLORIDE 600; 310; 30; 20 MG/100ML; MG/100ML; MG/100ML; MG/100ML
INJECTION, SOLUTION INTRAVENOUS CONTINUOUS
Status: DISCONTINUED | OUTPATIENT
Start: 2019-08-02 | End: 2019-08-02 | Stop reason: HOSPADM

## 2019-08-02 RX ORDER — ACETAMINOPHEN 325 MG/1
975 TABLET ORAL ONCE
Status: COMPLETED | OUTPATIENT
Start: 2019-08-02 | End: 2019-08-02

## 2019-08-02 RX ORDER — MEPERIDINE HYDROCHLORIDE 25 MG/ML
12.5 INJECTION INTRAMUSCULAR; INTRAVENOUS; SUBCUTANEOUS
Status: DISCONTINUED | OUTPATIENT
Start: 2019-08-02 | End: 2019-08-03 | Stop reason: HOSPADM

## 2019-08-02 RX ORDER — SODIUM CHLORIDE, SODIUM LACTATE, POTASSIUM CHLORIDE, CALCIUM CHLORIDE 600; 310; 30; 20 MG/100ML; MG/100ML; MG/100ML; MG/100ML
INJECTION, SOLUTION INTRAVENOUS CONTINUOUS
Status: DISCONTINUED | OUTPATIENT
Start: 2019-08-02 | End: 2019-08-03 | Stop reason: HOSPADM

## 2019-08-02 RX ORDER — OXYCODONE HYDROCHLORIDE 5 MG/1
5 TABLET ORAL EVERY 4 HOURS PRN
Status: DISCONTINUED | OUTPATIENT
Start: 2019-08-02 | End: 2019-08-03 | Stop reason: HOSPADM

## 2019-08-02 RX ADMIN — Medication 100 MCG: at 10:10

## 2019-08-02 RX ADMIN — ACETAMINOPHEN 975 MG: 325 TABLET ORAL at 08:23

## 2019-08-02 RX ADMIN — FENTANYL CITRATE 50 MCG: 50 INJECTION, SOLUTION INTRAMUSCULAR; INTRAVENOUS at 09:04

## 2019-08-02 RX ADMIN — PHENYLEPHRINE HYDROCHLORIDE 1 DROP: 25 SOLUTION/ DROPS OPHTHALMIC at 08:34

## 2019-08-02 RX ADMIN — PROPOFOL: 10 INJECTION, EMULSION INTRAVENOUS at 10:48

## 2019-08-02 RX ADMIN — PROPOFOL: 10 INJECTION, EMULSION INTRAVENOUS at 10:24

## 2019-08-02 RX ADMIN — PROPOFOL: 10 INJECTION, EMULSION INTRAVENOUS at 10:00

## 2019-08-02 RX ADMIN — SODIUM CHLORIDE, SODIUM LACTATE, POTASSIUM CHLORIDE, CALCIUM CHLORIDE: 600; 310; 30; 20 INJECTION, SOLUTION INTRAVENOUS at 08:23

## 2019-08-02 RX ADMIN — LIDOCAINE HYDROCHLORIDE 60 MG: 20 INJECTION, SOLUTION INFILTRATION; PERINEURAL at 09:04

## 2019-08-02 RX ADMIN — TROPICAMIDE 1 DROP: 10 SOLUTION/ DROPS OPHTHALMIC at 08:38

## 2019-08-02 RX ADMIN — Medication 100 MCG: at 10:24

## 2019-08-02 RX ADMIN — PROPOFOL 150 MG: 10 INJECTION, EMULSION INTRAVENOUS at 09:04

## 2019-08-02 RX ADMIN — PHENYLEPHRINE HYDROCHLORIDE 1 DROP: 25 SOLUTION/ DROPS OPHTHALMIC at 08:38

## 2019-08-02 RX ADMIN — PHENYLEPHRINE HYDROCHLORIDE 1 DROP: 25 SOLUTION/ DROPS OPHTHALMIC at 08:23

## 2019-08-02 RX ADMIN — PROPOFOL 200 MCG/KG/MIN: 10 INJECTION, EMULSION INTRAVENOUS at 09:06

## 2019-08-02 RX ADMIN — Medication 100 MCG: at 09:49

## 2019-08-02 RX ADMIN — TROPICAMIDE 1 DROP: 10 SOLUTION/ DROPS OPHTHALMIC at 08:34

## 2019-08-02 RX ADMIN — Medication 100 MCG: at 09:27

## 2019-08-02 RX ADMIN — TROPICAMIDE 1 DROP: 10 SOLUTION/ DROPS OPHTHALMIC at 08:23

## 2019-08-02 RX ADMIN — Medication 100 MCG: at 09:40

## 2019-08-02 RX ADMIN — FENTANYL CITRATE 25 MCG: 50 INJECTION, SOLUTION INTRAMUSCULAR; INTRAVENOUS at 10:57

## 2019-08-02 RX ADMIN — DEXAMETHASONE SODIUM PHOSPHATE 4 MG: 4 INJECTION, SOLUTION INTRA-ARTICULAR; INTRALESIONAL; INTRAMUSCULAR; INTRAVENOUS; SOFT TISSUE at 09:29

## 2019-08-02 RX ADMIN — ONDANSETRON 4 MG: 2 INJECTION INTRAMUSCULAR; INTRAVENOUS at 09:28

## 2019-08-02 ASSESSMENT — MIFFLIN-ST. JEOR: SCORE: 1198.96

## 2019-08-02 NOTE — ANESTHESIA POSTPROCEDURE EVALUATION
Anesthesia POST Procedure Evaluation    Patient: Mary Carlos   MRN:     [id number] Gender:   female   Age:    74 year old :      1945        Preoperative Diagnosis: Retinal Detachment   Procedure(s):  Left Eye 25 Gauge Parsplana Vitrectomy, Silicone Oil Removal, Membrane Peel, Retinectomy, Endolaser, Air/f;uid exchange, Infusion of 1000 Silicone Oil   Postop Comments: No value filed.       Anesthesia Type:  Not documented  General    Reportable Event: NO     PAIN: Uncomplicated   Sign Out status: Comfortable, Well controlled pain     PONV: No PONV   Sign Out status:  No Nausea or Vomiting     Neuro/Psych: Uneventful perioperative course   Sign Out Status: Preoperative baseline; Age appropriate mentation     Airway/Resp.: Uneventful perioperative course   Sign Out Status: Non labored breathing, age appropriate RR; Resp. Status within EXPECTED Parameters     CV: Uneventful perioperative course   Sign Out status: Appropriate BP and perfusion indices; Appropriate HR/Rhythm     Disposition:   Sign Out in:  PACU  Disposition:  Phase II; Home  Recovery Course: Uneventful  Follow-Up: Not required     Comments/Narrative:  Patient doing well post-operatively.  No significant issues.  Hemodynamically stable, pain well controlled, nausea well controlled.  Stable for discharge from the PACU             Last Anesthesia Record Vitals:  CRNA VITALS  2019 1101 - 2019 1201      2019             NIBP:  123/66    Pulse:  74    NIBP Mean:  84    Ht Rate:  74    SpO2:  99 %    Resp Rate (observed):  24          Last PACU Vitals:  Vitals Value Taken Time   /66 2019 11:50 AM   Temp 36.7  C (98  F) 2019 11:50 AM   Pulse 68 2019 11:49 AM   Resp 47 2019 11:50 AM   SpO2 95 % 2019 11:50 AM   Temp src     NIBP     Pulse     SpO2     Resp     Temp     Ht Rate     Temp 2     Vitals shown include unvalidated device data.      Electronically Signed By: Yandel Cannon MD, 2019, 12:19 PM

## 2019-08-02 NOTE — OP NOTE
Surgeon: yariel Garza MD  Assistant: Arnie Reyna MD  PREOPERATIVE DIAGNOSES: Recurrent Retinal detachment and inferior proliferative vitreoretinopathy, left eye  POSTOPERATIVE DIAGNOSES: same  PROCEDURES: 25 gauge pars plana vitectomy, membrane peel, perfluoroctane liquid (PFO), retinectomy, endolaser, air-fluid exchange, Silicone Oil 5000 cs  ANESTHESIA: General and Retrobulbar block.   COMPLICATIONS: None.   ESTIMATED BLOOD LOSS: Scant.   INDICATIONS: Mary Carlos is a 74 year old patient with a history of retinal detachment repair previously. The patient had recurrent Retinal detachment with inferior proliferative vitreoretinopathy and is here for Retinal detachment repair. Patient with history of syphilis with ocular involvement and secondray to  Retinal detachment. She currently completed treatment with penicillin.   DESCRIPTION OF PROCEDURE: The patient was taken to the operating room where general anesthesia was administered by the Anesthesia Department. The patient's  eye was then prepped and draped in the usual sterile surgical fashion for ophthalmic surgery, including instillation of 1 drop of 5% povidone iodine. A sterile drape was placed over the face and body and a lid speculum was inserted. The microscope was brought into the operative field.   A 25-gauge trocar was placed inferotemporally 3.5 mm posterior to the limbus. the infusion cannula was connected and its intravitreal location was verified by direct visualization. Two other 25-gauge trocars were placed nasally and superior temporally. The Biom was used to assist with the view during the vitrectomy.   The vitrector handpiece and endoilluminator were placed in the eye. Upon entering the eye it was noted that there is inferior shallow Retinal detachment extending inf periphery, With 1-2 clock hour of inferior proliferative vitreoretinopathy.  A vitrectomy was performed to remove residual peripheral vitreous, assisted with  scleral depression 360 degrees and kenalog. Then brilliant blue was instilled and ERM And ILM was removed using ILM and micro-tarah forceps.     Next, a small bubble of PFO was placed and it was noted that inferior peripheral retina is taut. Then, endocauthery was used to the inferior peripheral retina and to demarcate the areas of the retinectomy (from 4 to 10 o'clock). Next, an inferior retinectomy was performed along the marked area. Next, perfluoroctane liquid (PFO) was injected and the retina flatten nicely. Endolaser was applied to retinectomy edge and 2-3 rows behind the previous laser marks 360.     Next an air fluid exchange was performed and the perfluoroctane liquid (PFO) was removed. The retina remained attached under air. An inferior iridotomy was present and patent from previous surgery.     Silicone Oil 1000 cs was injected nasal trocar. Next, the trocars were removed and the sclerotomies were closed with 6-0 plain gut sutures. The eye was found to have a good pressure. Subconjunctival injections of Ancef and dexamethasone were administered. 2 ml of block consisting of a 1:1 mixture of 2% lidocaine and 0.75% Marcaine with Wydase was administered in the subconjunctival space. The lid speculum was removed. The eye was cleaned with wet and dry gauze. Atropine drop and Maxitrol ointment was applied to the eye. A clear shield were placed over the left eye. The patient was discharged to the postoperative care unit in stable condition, having tolerated the procedure well.     The surgery was assisted by Dr. Arnie Anand. Due to the delicate and complex nature of this surgery, Dr. Arnie Anand was required. Dr. Arnie Anand assisted with vitrectomy. I was present for the entire surgery.

## 2019-08-02 NOTE — ANESTHESIA CARE TRANSFER NOTE
Patient: Mary Carlos    Procedure(s):  Left Eye 25 Gauge Parsplana Vitrectomy, Silicone Oil Removal, Membrane Peel, Retinectomy, Endolaser, Air/f;uid exchange, Infusion of 1000 Silicone Oil    Diagnosis: Retinal Detachment  Diagnosis Additional Information: No value filed.    Anesthesia Type:   General     Note:  Airway :Room Air  Patient transferred to:PACU  Handoff Report: Identifed the Patient, Identified the Reponsible Provider, Reviewed the pertinent medical history, Discussed the surgical course, Reviewed Intra-OP anesthesia mangement and issues during anesthesia, Set expectations for post-procedure period and Allowed opportunity for questions and acknowledgement of understanding      Vitals: (Last set prior to Anesthesia Care Transfer)    CRNA VITALS  8/2/2019 1101 - 8/2/2019 1140      8/2/2019             NIBP:  123/66    Pulse:  74    NIBP Mean:  84    Ht Rate:  74    SpO2:  99 %    Resp Rate (observed):  24                Electronically Signed By: ROLANDO Arriaga CRNA  August 2, 2019  11:40 AM

## 2019-08-02 NOTE — DISCHARGE INSTRUCTIONS
[facility]  Home Care Following Anesthesia  For 24 hours after surgery:  1. Get plenty of rest.  A responsible adult must stay with you for at least 24 hours after you leave the surgery center.  2. Do not drive or use heavy equipment.  If you have weakness or tingling, don't drive or use heavy equipment until this feeling goes away.   3. Do not drink alcohol.   4. Avoid strenuous or risky activities.  Ask for help when climbing stairs.  5. You may feel lightheaded.  IF so, sit for a few minutes before standing.  Have someone help you get up.   6. If you have nausea (feel sick to your stomach): Drink only clear liquids such as apple juice, ginger ale, broth or 7-Up.  Rest may also help.  Be sure to drink enough fluids.  Move to a regular diet as you feel able.   7. You may have a slight fever.  Call the doctor if your fever is over 100 F (37.7 C) (taken under the tongue) or lasts longer than 24 hours.  8. You may have a dry mouth, a sore throat, muscle aches or trouble sleeping. These should go away after 24 hours.  9. Do not make important or legal decisions.             Tips for taking pain medications  To get the best pain relief possible, remember these points:    Take pain medications as directed, before pain becomes severe.    Pain medication can upset your stomach: taking it with food may help.    Constipation is a common side effect of pain medication. Drink plenty of  fluids.    Eat foods high in fiber. Take a stool softener if recommended by your doctor or pharmacist.    Do not drink alcohol, drive or operate machinery while taking pain medications.    Ask about other ways to control pain, such as with heat, ice or relaxation.    Tylenol/Acetaminophen Consumption  To help encourage the safe use of acetaminophen, the makers of TYLENOL  have lowered the maximum daily dose for single-ingredient Extra Strength TYLENOL  (acetaminophen) products sold in the [place] from 8 pills  per day (4,000 mg) to 6 pills per day (3,000 mg). The dosing interval has also changed from 2 pills every 4-6 hours to 2 pills every 6 hours.    If you feel your pain relief is insufficient, you may take Tylenol/Acetaminophen in addition to your narcotic pain medication.     Be careful not to exceed 3,000 mg of Tylenol/Acetaminophen in a 24 hour period from all sources.    If you are taking extra strength Tylenol/acetaminophen (500 mg), the maximum dose is 6 tablets in 24 hours.    If you are taking regular strength acetaminophen (325 mg), the maximum dose is 9 tablets in 24 hours.    Tylenol 975mg given at 8:23am. Next dose available after 2:23pm. Then follow package instructions.     Call a doctor for any of the followin. Signs of infection (fever, growing tenderness at the surgery site, a large amount of drainage or bleeding, severe pain, foul-smelling drainage, redness, swelling).  2. It has been over 8 to 10 hours since surgery and you are still not able to urinate (pass water).  3. Headache for over 24 hours.    Your doctor is:       Dr. Carolee Garza, Ophthalmology: [phone]               Or dial [phone] and ask for the resident on call for:  Ophthalmology  For emergency care, call the:  [facility]:  [phone] (TTY for hearing impaired: [phone])      [facility]  [phone]  Post Operative Eye Surgery Instructions    MD Carolee Garcia MD  Will I have pain?  Some discomfort is normal and expected following surgery. The first few days after surgery you may need to use prescription pain pills. Taking Tylenol (acetaminophen) regularly may help prevent pain.  Discomfort should gradually decrease and Tylenol should be sufficient to relieve pain. A foreign body sensation in the cornea of the eye is very common and caused by sutures placed at surgery. These sutures will go away in one to two weeks. If the pain worsens, you should call  the doctor.  Do I need to wear an eye patch?  You do not need to wear an eye patch at home after the doctor has removed the patch on your first day after surgery. However, you may be more comfortable wearing a patch outside in the sun, when sleeping or napping, or in a maria, windy environment.  How much drainage should I have?  You may expect a moderate amount of drainage for a week. Gradually the drainage should decrease. The lids can be cleaned with a clean washcloth and gentle soap or diluted baby shampoo. Wipe the eyelids gently from the nose outward. Some blood in the tears is a normal finding.  Will there be swelling?  Some swelling is normal for about a week or two after which it will gradually decrease. Applying a cool compress, using a clean washcloth for 5 - 10 minutes several times a day may reduce the swelling and make you more comfortable. People may have some swelling of both eyes, especially if face down positioning is required. The white part of the eye may appear very red or bloody for a week or two. This may get worse a few days after surgery. Though the bright red appearance can look frightening, it is a normal finding early after surgery and will resolve in a few weeks.  Will I need to use eye drops?  You will be using several different kinds of eye drops or ointment (salve) when you leave the hospital. The directions will be on each bottle or tube. The medication with the red top will keep your eye dilated and may make your eye more sensitive to light. Wearing sunglasses may help. The other medication is a combination antibiotic/steroid to prevent infection and promote healing.  Occasionally a third drop is used to control the pressure in your eye. A new bottle of artificial tears or lubricant ointment may be used along with your prescription eye drops after surgery. You will be using drops from four to eight weeks. Bring all eye medications (drops. ointments, or pills) with you  to each visit.    Always wash your hands before putting in the eye drops. You may wish to have someone else help you. Pull down on the lower lid and squeeze one drop from the bottle being careful not to touch the dropper to your eye or eyelid. One drop is sufficient, but another may be used if the first did not go into the eye. It is often easier to put in the drops if you are reclining or lying down. Wait five (5) minutes after the first drop before using the second drop to allow the medications to absorb into the eye.  How long will it take for my vision to improve?  Your vision should gradually improve, but it may take up to six months to regain your best vision. Frequently, air or gas bubbles are injected into the eye at the time of surgery. This will blur your vision significantly at first. As the bubble becomes smaller it will cause a black line in your vision that moves as you move your head. As the bubble becomes smaller you may notice that it looks more like a bubble or that it will break up into several smaller bubbles. It will take from a few days to a few weeks for the bubble to dissolve and be replaced by clear fluid.  You may notice floaters or double vision after your surgery. These symptoms usually will decrease with time. If the double vision is bothersome patching the eye may help.  If you notice a sudden worsening in your vision call your doctor.  Are there any physical restrictions after surgery?  If an air or gas bubble was placed in the eye during surgery, you will be asked to spend most of your time (both awake and during the night) with your head in a specific position, frequently face down. As the eye heals and the bubble dissolves there will be less of a need for you to stay in that specific position. You should avoid sleeping on your back until the bubble has totally dissolved and you have been given permission from your surgeon. You should not fly in an airplane or go to high altitudes in the mountains  while there is a bubble in your eye. If you should require any other surgery, under general anesthesia, while you still have an air bubble in your eye, have your surgeon or anesthetist contact us prior to your surgery. Some anesthetic agents can make the bubble expand and seriously damage your eye.  Patients that have a gas/air bubble placed in their eye will have a green medical alert band on their wrist.  This band should not be removed until the doctor removes it for you or gives you permission to remove it.     Heavy lifting (greater than 50 pounds), swimming and contact sports should be avoided for about 3 to 4 weeks after surgery.  You may resume your usual sexual activities about one week after surgery.  When may I return to work or my normal activities?  Depending on the type or work, you may return to work within a few days. If your work involves physical activity or driving, you will need to restrict your activities and remain home longer.  You may watch television, look at magazines, or work puzzles. Reading may be uncomfortable for several days, but using the eyes will not cause any damage.  You may go outside as usual. If conditions are windy or maria, wear an eye pad to avoid getting dust or dirt in the eye.  Can I travel?  You cannot fly in an airplane or drive into the mountains as long as the air or gas bubble remains in your eye.    Are there any driving restrictions?  Someone will need to drive you home from the hospital. Generally driving can be resumed in several days if you have good vision in your other eye. If you do not feel comfortable driving, do not drive! Your depth perception will be decreased so you will want to try driving during the day in light traffic until you feel comfortable driving. You should restrict your driving while you are taking prescription pain pills as they also can affect your judgment.  When can I shower and wash my hair?  You may shower or bathe when you get home,  but avoid getting water in your eye. You may want someone to help you shampoo your hair at first.  You may shave, brush your teeth, or comb your hair. Do not use make-up, mascara, or creams/lotions around your eye for several weeks.  When will I see the doctor again?  Generally, you will be seen the first day after surgery and again 1-2 weeks later. If you have not received a return appointment before leaving the hospital, you should call our office during the business hours to arrange an appointment. If you will be seeing your local doctor instead of us, you will need to call that office to set up an appointment.    If you have a green armband on, your physician will instruct you as to how long you will have to wear it at your post-operative follow-up appointment.  How do I reach a doctor if I have concerns?  One of our doctors is available by calling [facility] [phone]. Please try to call for routine questions and prescription refills during business hours.  You should call your doctor if:  You notice a sudden decrease in your vision.  Have severe pain or pain increases rather than subsiding.  You notice a new black curtain over your eye that is not the gas bubble. If you have any of these symptoms, you may need to be examined.

## 2019-08-02 NOTE — OR NURSING
Patient left without prescribed medication.  Called and instructed to pick it up at follow up appointment tomorrow.

## 2019-08-02 NOTE — ANESTHESIA PREPROCEDURE EVALUATION
Anesthesia Pre-Procedure Evaluation    Patient: Mary Carlos   MRN:     [id number] Gender:   female   Age:    74 year old :      1945        Preoperative Diagnosis: Retinal Detachment   Procedure(s):  Left Eye 25 Gauge Parsplana Vitrectomy, Silicone Oil Removal, Membrane Peeling, Possible Retinectomy, Endolaser, Gas Fluid Exchange, Possible Gas or Silicone Oil     Past Medical History:   Diagnosis Date     Allergic rhinitis      Arthropathy      Breast cancer (H)      Glaucoma      HTN, goal below 140/90      Intestinal malabsorption      Scleritis       Past Surgical History:   Procedure Laterality Date     APPENDECTOMY       ARTHROSCOPY KNEE       C BREAST RECONSTRUCT W TRAM 1 PEDICL       C TOTAL KNEE ARTHROPLASTY      Bilateral     CHOLECYSTECTOMY       HYSTERECTOMY TOTAL ABDOMINAL      Benign      MASTECTOMY  2012     VITRECTOMY PARSPLANA WITH 25 GAUGE SYSTEM Left 2019    Procedure: LEFT EYE 25 GAUGE VITRECTOMY, PARS PLANA LENSECTOMY, MEMBRANE PEEL,  ENDOLASER, POSTERIOR SYNECHIOLYSIS, IRIDOTOMY, AIR FLUID EXCHANGE, INFUSION SILICONE OIL;  Surgeon: Carolee Garza MD;  Location: [facility]          Anesthesia Evaluation     .             ROS/MED HX    ENT/Pulmonary:     (+)allergic rhinitis, , . Other pulmonary disease latent TB, on medication.    Neurologic:     (+)other neuro neurosyphilis     Cardiovascular:     (+) hypertension----. : . . . :. .       METS/Exercise Tolerance:     Hematologic:     (+) Anemia, -      Musculoskeletal:   (+) arthritis,  -       GI/Hepatic:     (+) GERD Other GI/Hepatic intestinal malabsorption      Renal/Genitourinary:         Endo:         Psychiatric:         Infectious Disease:         Malignancy:   (+) Malignancy History of Breast          Other:                         PHYSICAL EXAM:   Mental Status/Neuro: A/A/O   Airway: Facies: Feasible  Mallampati: I  Mouth/Opening: Full  TM distance: > 6 cm  Neck ROM: Full   Respiratory: Auscultation: CTAB    "  Resp. Rate: Normal     Resp. Effort: Normal      CV: Rhythm: Regular  Rate: Age appropriate  Heart: Normal Sounds  Edema: None   Comments:      Dental: Normal Dentition                LABS:  CBC:   Lab Results   Component Value Date    WBC 9.5 07/01/2019    WBC 12.2 (H) 06/30/2019    HGB 8.3 (L) 07/01/2019    HGB 8.1 (L) 06/30/2019    HCT 28.0 (L) 07/01/2019    HCT 27.1 (L) 06/30/2019     07/01/2019     06/30/2019     BMP:   Lab Results   Component Value Date     07/01/2019     06/30/2019    POTASSIUM 3.7 07/01/2019    POTASSIUM 3.8 06/30/2019    CHLORIDE 111 (H) 07/01/2019    CHLORIDE 110 (H) 06/30/2019    CO2 28 07/01/2019    CO2 25 06/30/2019    BUN 15 07/01/2019    BUN 16 06/30/2019    CR 0.89 07/01/2019    CR 0.89 06/30/2019    GLC 88 07/01/2019    GLC 86 06/30/2019     COAGS:   Lab Results   Component Value Date    INR 1.09 06/27/2019     POC: No results found for: BGM, HCG, HCGS  OTHER:   Lab Results   Component Value Date    LACT 0.7 06/27/2019    MAGGY 7.4 (L) 07/01/2019    ALBUMIN 1.8 (L) 06/27/2019    PROTTOTAL 4.3 (L) 06/27/2019    ALT 20 06/27/2019    AST 8 06/27/2019    ALKPHOS 74 06/27/2019    BILITOTAL 1.0 06/27/2019    TSH 0.82 01/02/2015        Preop Vitals    BP Readings from Last 3 Encounters:   07/01/19 149/66   06/17/19 161/79   06/11/19 166/70    Pulse Readings from Last 3 Encounters:   06/30/19 71   06/17/19 67   06/11/19 60      Resp Readings from Last 3 Encounters:   07/01/19 18   06/17/19 16   06/11/19 16    SpO2 Readings from Last 3 Encounters:   07/01/19 98%   06/17/19 96%   06/11/19 98%      Temp Readings from Last 1 Encounters:   07/01/19 36.8  C (98.2  F) (Oral)    Ht Readings from Last 1 Encounters:   06/17/19 1.6 m (5' 3\")      Wt Readings from Last 1 Encounters:   06/29/19 79 kg (174 lb 2.6 oz)    Estimated body mass index is 30.85 kg/m  as calculated from the following:    Height as of 6/17/19: 1.6 m (5' 3\").    Weight as of 6/29/19: 79 kg (174 lb 2.6 " oz).     LDA:        Assessment:   ASA SCORE: 3    H&P: History and physical reviewed and following examination; no interval change.    NPO Status: NPO Appropriate     Plan:   Anes. Type:  General   Pre-Medication: None   Induction:  IV (Standard)   Airway: LMA   Access/Monitoring: PIV   Maintenance: Balanced     Postop Plan:   Postop Pain: Opioids  Postop Sedation/Airway: Not planned     PONV Management:   Adult Risk Factors: Female, Postop Opioids   Prevention: Ondansetron, Dexamethasone     CONSENT: Direct conversation   Plan and risks discussed with: Patient   Blood Products: Consent Deferred (Minimal Blood Loss)                   Yandel Cannon MD

## 2019-08-05 ENCOUNTER — TELEPHONE (OUTPATIENT)
Dept: OPHTHALMOLOGY | Facility: CLINIC | Age: 74
End: 2019-08-05

## 2019-08-05 NOTE — TELEPHONE ENCOUNTER
Brook (pt's daughter) called to reschedule 1 week PO since she leaves out of town the day the current one is set for.    We reschedule for 8/7/19 @9:45AM with Dr. Garza

## 2019-08-07 ENCOUNTER — OFFICE VISIT (OUTPATIENT)
Dept: OPHTHALMOLOGY | Facility: CLINIC | Age: 74
End: 2019-08-07
Attending: OPHTHALMOLOGY
Payer: MEDICARE

## 2019-08-07 DIAGNOSIS — Z48.810 AFTERCARE FOLLOWING SURGERY OF A SENSORY ORGAN: Primary | ICD-10-CM

## 2019-08-07 PROBLEM — Z87.19 HISTORY OF GI BLEED: Status: ACTIVE | Noted: 2019-07-18

## 2019-08-07 PROBLEM — A51.49: Status: ACTIVE | Noted: 2019-06-14

## 2019-08-07 PROBLEM — K57.12 DIVERTICULITIS OF SMALL INTESTINE: Status: ACTIVE | Noted: 2019-07-18

## 2019-08-07 PROBLEM — A04.8 HELICOBACTER PYLORI INFECTION: Status: ACTIVE | Noted: 2019-07-18

## 2019-08-07 PROBLEM — Z22.7 TB LUNG, LATENT: Status: ACTIVE | Noted: 2019-06-14

## 2019-08-07 PROCEDURE — G0463 HOSPITAL OUTPT CLINIC VISIT: HCPCS | Mod: ZF

## 2019-08-07 ASSESSMENT — EXTERNAL EXAM - RIGHT EYE: OD_EXAM: NORMAL

## 2019-08-07 ASSESSMENT — SLIT LAMP EXAM - LIDS
COMMENTS: NORMAL
COMMENTS: PROTECTIVE PTOSIS

## 2019-08-07 ASSESSMENT — VISUAL ACUITY
METHOD: SNELLEN - LINEAR
OD_SC: 20/80
OS_PH_SC: 20/125
OD_PH_SC+: +1
OD_PH_SC: 20/70

## 2019-08-07 ASSESSMENT — TONOMETRY
IOP_METHOD: ICARE
OD_IOP_MMHG: 9
OS_IOP_MMHG: 2

## 2019-08-07 ASSESSMENT — EXTERNAL EXAM - LEFT EYE: OS_EXAM: NORMAL

## 2019-08-07 NOTE — NURSING NOTE
Chief Complaints and History of Present Illnesses   Patient presents with     Post Op (Ophthalmology) Left Eye     Chief Complaint(s) and History of Present Illness(es)     Post Op (Ophthalmology) Left Eye     Laterality: left eye    Associated symptoms: double vision.  Negative for eye pain    Pain scale: 0/10              Comments     Chief Complaints and History of Present Illnesses   Patient presents with     Post Op (Ophthalmology) Left Eye     Chief Complaint(s) and History of Present Illness(es)     Post Op (Ophthalmology) Left Eye     Laterality: left eye    Associated symptoms: double vision.  Negative for eye pain    Pain scale: 0/10              Comments     Chief Complaints and History of Present Illnesses   Patient presents with     Post Op (Ophthalmology) Left Eye     Chief Complaint(s) and History of Present Illness(es)     Post Op (Ophthalmology) Left Eye     Laterality: left eye    Associated symptoms: double vision.  Negative for eye pain    Pain scale: 0/10              Comments     POW #7 of LE s/p PPV/lensectomy/RD repair with silicone oil placement left eye 7/31/19  'I see double and triple sometimes. Have to stand right in front of the TV to read captions.' Keeps black patch eye on when goes out in public so does not draw attention to herself per pt.  No VA improvement.  Prednisolone 4 times a day to LE and Atropine daily / instilled by demetrius.  Tammy De La Rosa COT 10:00 AM 08/07/2019

## 2019-08-07 NOTE — PATIENT INSTRUCTIONS
maxitrol ointment at bedtime till finish  Taper Pred Forte four times a day; then Three times a day x 1 week, then twice a day x 1 week and then once a day till finish  Stop Atropine  Follow up 1 month

## 2019-08-07 NOTE — PROGRESS NOTES
Postoperative week 1 status post PPV/MP/SO/EL left eye  for recurrent RRD s/p PPV/lensectomy/MP/EL/Posterior synechiolysis/Iris hooks/retinectomy/PFO/AFX/SO left eye 8.2.19    Retina attached  Doing well    Plan:  Position: any  Retina detachment and endophthalmitis precautions were discussed with the patient and was asked to return if any of the those occur    Medications to operative eye  maxitrol ointment at bedtime till finish  Taper Pred Forte four times a day; then Three times a day x 1 week, then twice a day x 1 week and then once a day till finish  Stop Atropine  Follow up 1 month    Paramjit Espinoza MD  Ophthalmology, PGY-4    ~~~~~~~~~~~~~~~~~~~~~~~~~~~~~~~~~~   Complete documentation of historical and exam elements from today's encounter can be found in the full encounter summary report (not reduplicated in this progress note).  I personally obtained the chief complaint(s) and history of present illness.  I confirmed and edited as necessary the review of systems, past medical/surgical history, family history, social history, and examination findings as documented by others; and I examined the patient myself.  I personally reviewed the relevant tests, images, and reports as documented above.  I formulated and edited as necessary the assessment and plan and discussed the findings and management plan with the patient and family    Carolee Garza MD  .  Retina Service   Department of Ophthalmology and Visual Neurosciences   [facility]  Phone: [phone]   Fax: [phone]

## 2019-08-13 LAB
MYCOBACTERIUM SPEC CULT: NORMAL
SPECIMEN SOURCE: NORMAL

## 2019-09-05 ENCOUNTER — OFFICE VISIT (OUTPATIENT)
Dept: OPHTHALMOLOGY | Facility: CLINIC | Age: 74
End: 2019-09-05
Attending: OPHTHALMOLOGY
Payer: MEDICARE

## 2019-09-05 DIAGNOSIS — Z48.810 AFTERCARE FOLLOWING SURGERY OF A SENSORY ORGAN: ICD-10-CM

## 2019-09-05 DIAGNOSIS — Z98.890 POST-OPERATIVE STATE: Primary | ICD-10-CM

## 2019-09-05 DIAGNOSIS — H44.112 PANUVEITIS OF LEFT EYE: ICD-10-CM

## 2019-09-05 PROCEDURE — 92134 CPTRZ OPH DX IMG PST SGM RTA: CPT | Mod: ZF | Performed by: OPHTHALMOLOGY

## 2019-09-05 PROCEDURE — G0463 HOSPITAL OUTPT CLINIC VISIT: HCPCS | Mod: ZF

## 2019-09-05 PROCEDURE — 92015 DETERMINE REFRACTIVE STATE: CPT | Mod: GY,ZF

## 2019-09-05 PROCEDURE — 92250 FUNDUS PHOTOGRAPHY W/I&R: CPT | Mod: ZF | Performed by: OPHTHALMOLOGY

## 2019-09-05 ASSESSMENT — VISUAL ACUITY
METHOD_MR: PT REQUESTS REFRACTION
OD_SC: 20/125
OD_SC+: +
OS_SC: CF @ 3'
OD_PH_SC: 20/70
METHOD: SNELLEN - LINEAR

## 2019-09-05 ASSESSMENT — REFRACTION_MANIFEST
OD_ADD: +2.75
OS_AXIS: 121
OS_SPHERE: +0.50
OD_CYLINDER: SPHERE
OD_SPHERE: -6.50
OS_CYLINDER: +3.00

## 2019-09-05 ASSESSMENT — TONOMETRY
OD_IOP_MMHG: 15
OS_IOP_MMHG: 7
IOP_METHOD: TONOPEN

## 2019-09-05 ASSESSMENT — EXTERNAL EXAM - LEFT EYE: OS_EXAM: NORMAL

## 2019-09-05 ASSESSMENT — EXTERNAL EXAM - RIGHT EYE: OD_EXAM: NORMAL

## 2019-09-05 ASSESSMENT — SLIT LAMP EXAM - LIDS
COMMENTS: NORMAL
COMMENTS: PROTECTIVE PTOSIS

## 2019-09-05 NOTE — NURSING NOTE
Chief Complaints and History of Present Illnesses   Patient presents with     Post Op (Ophthalmology) Left Eye     status post PPV/MP/SO/EL left eye  for recurrent RRD s/p PPV/lensectomy/MP/EL/Posterior synechiolysis/Iris hooks/retinectomy/PFO/AFX/SO left eye 8.2.19     Chief Complaint(s) and History of Present Illness(es)     Post Op (Ophthalmology) Left Eye     Laterality: left eye    Associated symptoms: tearing.  Negative for dryness, eye pain and redness    Comments: status post PPV/MP/SO/EL left eye  for recurrent RRD s/p PPV/lensectomy/MP/EL/Posterior synechiolysis/Iris hooks/retinectomy/PFO/AFX/SO left eye 8.2.19              Comments     Patient states that her vision in the left eye remains hazy.  Patient denies having any eye discomfort.    Reyna Fletcher, MANNY 11:05 AM  September 5, 2019

## 2019-09-05 NOTE — PATIENT INSTRUCTIONS
Medications to operative eye  maxitrol ointment at bedtime till finish  Taper Pred Forte  twice a day   taper prednisolone to 20 mg/day x 5 days then  10mg x 5 days then 5 mg x 5 days  Follow up in 2 weeks with Optical Coherence Tomography

## 2019-09-05 NOTE — PROGRESS NOTES
Postoperative month 1 status post PPV/MP/SO/EL left eye  for recurrent RRD s/p PPV/lensectomy/MP/EL/Posterior synechiolysis/Iris hooks/retinectomy/PFO/AFX/SO left eye 8.2.19    Inf macula area with persistent subretinal fluid   Doing well    Optical Coherence Tomography: 09/05/19    Right eye: good foveal contour  Left eye: subretinal fluid inf to macula and cystic changes   optos pic 09/05/19 consistent with exam  Autofluorescence 09/05/19 : left eye peripheral hypoautofluorescence of the laser scars and hypoautofluorescence of the inf macula area of subretinal fluid     Plan:  Observe and follow up in 2 weeks- if persistent worsening subretinal fluid could consider surgery   Retina detachment and endophthalmitis precautions were discussed with the patient and was asked to return if any of the those occur    Medications to operative eye  maxitrol ointment at bedtime till finish  Taper Pred Forte  twice a day   taper prednisolone to 20 mg/day x 5 days then  10mg x 5 days then 5 mg x 5 days  Follow up in 2 weeks with Optical Coherence Tomography     ~~~~~~~~~~~~~~~~~~~~~~~~~~~~~~~~~~   Complete documentation of historical and exam elements from today's encounter can be found in the full encounter summary report (not reduplicated in this progress note).  I personally obtained the chief complaint(s) and history of present illness.  I confirmed and edited as necessary the review of systems, past medical/surgical history, family history, social history, and examination findings as documented by others; and I examined the patient myself.  I personally reviewed the relevant tests, images, and reports as documented above.  I formulated and edited as necessary the assessment and plan and discussed the findings and management plan with the patient and family    Carolee Garza MD  .  Retina Service   Department of Ophthalmology and Visual Neurosciences   [facility]  Phone: [phone]    Fax: [phone]

## 2019-09-06 ENCOUNTER — OFFICE VISIT (OUTPATIENT)
Dept: OPHTHALMOLOGY | Facility: CLINIC | Age: 74
End: 2019-09-06
Attending: OPHTHALMOLOGY
Payer: MEDICARE

## 2019-09-06 DIAGNOSIS — H44.112 PANUVEITIS OF LEFT EYE: Primary | ICD-10-CM

## 2019-09-06 DIAGNOSIS — H16.323 DIFFUSE INTERSTITIAL KERATITIS OF BOTH EYES: ICD-10-CM

## 2019-09-06 DIAGNOSIS — H15.002 SCLERITIS AND EPISCLERITIS OF LEFT EYE: ICD-10-CM

## 2019-09-06 DIAGNOSIS — H15.102 SCLERITIS AND EPISCLERITIS OF LEFT EYE: ICD-10-CM

## 2019-09-06 PROCEDURE — G0463 HOSPITAL OUTPT CLINIC VISIT: HCPCS | Mod: ZF

## 2019-09-06 ASSESSMENT — TONOMETRY
OS_IOP_MMHG: 09
OD_IOP_MMHG: 17
IOP_METHOD: TONOPEN

## 2019-09-06 ASSESSMENT — VISUAL ACUITY
OD_SC: 20/125
METHOD: SNELLEN - LINEAR
OS_SC: CF @4'
OD_PH_SC: 20/100
OD_PH_SC+: +

## 2019-09-06 ASSESSMENT — EXTERNAL EXAM - LEFT EYE: OS_EXAM: NORMAL

## 2019-09-06 ASSESSMENT — CONF VISUAL FIELD
OS_SUPERIOR_NASAL_RESTRICTION: 3
OS_SUPERIOR_TEMPORAL_RESTRICTION: 1
OD_NORMAL: 1
METHOD: COUNTING FINGERS

## 2019-09-06 ASSESSMENT — SLIT LAMP EXAM - LIDS
COMMENTS: NORMAL
COMMENTS: PROTECTIVE PTOSIS

## 2019-09-06 ASSESSMENT — EXTERNAL EXAM - RIGHT EYE: OD_EXAM: NORMAL

## 2019-09-06 NOTE — NURSING NOTE
Chief Complaints and History of Present Illnesses   Patient presents with     Follow Up     Panuveitis of left eye     Chief Complaint(s) and History of Present Illness(es)     Follow Up     Laterality: left eye    Associated symptoms: Negative for eye pain, dryness, redness and tearing (infrequently the left eye will have sticky matter)    Pain scale: 0/10    Comments: Panuveitis of left eye              Comments     She states that her vision has seems blurred in both eyes since yesterday's eye exam.  Both eyes are comfortable.    Reyna Fletcher, COT 2:50 PM  September 6, 2019

## 2019-09-06 NOTE — Clinical Note
When I saw Ms. Carlos last Friday, she was doing well from a uveitis standpoint. I did not set follow up with me, since I'm not managing much for her now (also if the uveitis is syphilitic which I think is likely perhaps she will not need chronic uveitis treatment), but I am happy to see her back when you think it would be helpful.Thanks, Katie

## 2019-09-06 NOTE — PROGRESS NOTES
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 Today she reports that vision is still fuzzy and about the same. No eye pain or redness.    Current ocular medications: PredForte left eye two times a day, oral prednisone 20 mg/day plan to decrease to 10 mg/day in 5 days (max dose 60 mg/day, stopped at 40 mg/day ~7.21.19 then restarted 40 mg/day 7.26.19 and has slowly tapered).  Prior ocular medications: oral indomethacin (stopped for GI bleed), Maxitrol, atropine.    Ocular history:   1. 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   - Prior to evaluation at [facility] eye clinic 6.6.19, she had been followed by Dr. Conteh for about 2 years with waxing and waning left > right eye redness and pain. She had been diagnosed with scleritis both eyes and was treated with topical CsA, topical prednisolone, and topical NSAIDS. Her corneal changes both eyes initially were presumed secondary to ocular rosacea but in March 2019 additional vascular ingrowth was noted, so patient was referred to Dr. Almodovar (rheum) for scleritis workup. Dr. Almodovar referred her to [facility] eye clinic for additional scleritis evaluation, since in May 2019 vision left eye (previously her better seeing eye) was markedly decreased.   - March 2019 near vision was 20/100 right eye and 20/30 left eye, per outside notes   - 6.6.19 exam her left eye was noted to have scleral injection, AC cell/flare, posterior synechiae, and vitritis on B-Scan. Both eyes were noted to have peripheral corneal haze with KNV. Work-up revealed positive Treponemal antibodies (+ x2), + Quant-Tb with negative chest x-ray, as well as additional elevated inflammatory markers. She was treated for IV penicillin for presumed ocular syphilis.  2. 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  Intraop findings:   - subtotal retinal detachment with peripheral areas of ischemia   - underlying choroidals   - superior retinal tear   - anterior proliferative vitreoretinopathy  S/p 25 gauge pars plana vitectomy, membrane peel, perfluoroctane liquid (PFO), retinectomy, endolaser, air-fluid exchange, Silicone Oil 5000 cs for recurrent Retinal detachment with PVR left eye 8.2.19.  3. Patient reports a history of \"glaucoma surgery in both eyes\" 30-35 years ago in [place], [place] (further details unknown).  4. Cataract right eye  5. Pseudophakia left eye    Medical history:  1. Admitted 2 days ago for acute blood loss anemia related to melena, found to have Hgb 6.3 in clinic and admitted for evaluation and treatment, being managed conservatively.  2. HTN  3. CKD stage III  4. Breast cancer s/p mastectomy (?2012)  5. Cholecystectomy (prior to 1995)    Social history: She is a former smoker (quit in 2011). Denies alcohol and IV drug use. 2 dogs and 1 cat for pets. She has never lived outside the [place].    Laboratory/Imaging Results:  6.17.19 left vitreous biopsy: cultures negative to date, additional testing (unsure of details) pending  6.6.19 chest x-ray and RPR both normal/negative. Quantiferon-Tb positive (0.5-0.6). Treponema Ab and TP-PA both positive.  Outside rheum testing: +Anti-nuclear antibody 2010, +SSA and SSB, +HLAB27, +RF (IgA)    Ocular Imaging:  Macular OCT 9.5.19:  Right eye: normal foveal contour, retinal layers intact, no intraretinal or subretinal fluid, stable.  Left eye: outer retinal atrophy/attenuation, attached superior macula but inferotemporal subretinal fluid with overlying schisis-like intraretinal cysts    Retinal nerve fiber layer OCT 6.28.19:  Right eye: borderline TI otherwise " normal  Left eye: unreliable tracing    Impression/Plan:  1. Scleritis and panuveitis left eye, quiescent today. Presumed syphilitic given positive syphilis testing (serum; intravitreal testing was not available per surgery center); now s/p IV PCN treatment. If the uveitis is syphilis, there is less likely to be a need for chronic uveitis treatment in the future. Differential diagnosis includes Tb, manifestation of systemic autoimmune disease, idiopathic.   - Agree with remaining off oral NSAIDs given GI bleed   - Taper oral prednisone per Dr. Garza (as above)   - Continue Predforte left eye twice a day  2. 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 Likely related to scleritis, will watch for PUK-like picture. Appears quiescent today.   - Monitor  3. Positive Quantiferon-Tb.   - Continue latent Tb treatment, started 6.11.19.   - Ocular Tb is less likely but remains a possibility. In the future, if scleritis/uveitis flares and cannot be controlled with corticosteroids, could consider active Tb treatment.  4. S/p recurrent retinal detachment repair left eye with chronic subretinal fluid.   - Continue following with Dr. Garza  5. Cataract right eye, visually significant.   - Recommend waiting at least 3 months (early Dec or later) for cataract surgery, with either perioperative oral prednisone (start a few days prior to surgery at 60 mg/day and taper relatively quickly postop) or frequent Durezol instead of Predforte postop.    Follow up with Dr. Garza as scheduled; as needed with uveitis.      Attending Physician Attestation:  Complete documentation of historical and exam elements from today's encounter can be found in the full encounter summary report (not reduplicated in this progress note).  I personally obtained the chief complaint(s) and  history of present illness.  I confirmed and edited as necessary the review of systems, past medical/surgical history, family history, social history, and examination findings as documented by others; and I examined the patient myself.  I personally reviewed the relevant tests, images, and reports as documented above.  I formulated and edited as necessary the assessment and plan and discussed the findings and management plan with the patient and family.  - Katie Young M.D.

## 2019-09-26 ENCOUNTER — PREP FOR PROCEDURE (OUTPATIENT)
Dept: OPHTHALMOLOGY | Facility: CLINIC | Age: 74
End: 2019-09-26

## 2019-09-26 ENCOUNTER — OFFICE VISIT (OUTPATIENT)
Dept: OPHTHALMOLOGY | Facility: CLINIC | Age: 74
End: 2019-09-26
Attending: OPHTHALMOLOGY
Payer: MEDICARE

## 2019-09-26 ENCOUNTER — OFFICE VISIT (OUTPATIENT)
Dept: SURGERY | Facility: CLINIC | Age: 74
End: 2019-09-26
Payer: MEDICARE

## 2019-09-26 ENCOUNTER — ANESTHESIA EVENT (OUTPATIENT)
Dept: SURGERY | Facility: CLINIC | Age: 74
End: 2019-09-26

## 2019-09-26 VITALS
HEIGHT: 64 IN | HEART RATE: 82 BPM | TEMPERATURE: 98.3 F | BODY MASS INDEX: 29.19 KG/M2 | OXYGEN SATURATION: 97 % | WEIGHT: 171 LBS | DIASTOLIC BLOOD PRESSURE: 77 MMHG | RESPIRATION RATE: 20 BRPM | SYSTOLIC BLOOD PRESSURE: 126 MMHG

## 2019-09-26 DIAGNOSIS — H44.112 PANUVEITIS OF LEFT EYE: ICD-10-CM

## 2019-09-26 DIAGNOSIS — Z01.818 PREOP EXAMINATION: Primary | ICD-10-CM

## 2019-09-26 DIAGNOSIS — H33.052 RECENT RETINAL DETACHMENT OF LEFT EYE, TOTAL/SUBTOTAL: Primary | ICD-10-CM

## 2019-09-26 PROCEDURE — G0463 HOSPITAL OUTPT CLINIC VISIT: HCPCS | Mod: ZF

## 2019-09-26 PROCEDURE — 92134 CPTRZ OPH DX IMG PST SGM RTA: CPT | Mod: ZF | Performed by: OPHTHALMOLOGY

## 2019-09-26 ASSESSMENT — MIFFLIN-ST. JEOR: SCORE: 1255.65

## 2019-09-26 ASSESSMENT — LIFESTYLE VARIABLES: TOBACCO_USE: 1

## 2019-09-26 ASSESSMENT — TONOMETRY
IOP_METHOD: TONOPEN
OS_IOP_MMHG: --
OS_IOP_MMHG: 05
IOP_METHOD: ICARE
OD_IOP_MMHG: 15

## 2019-09-26 ASSESSMENT — EXTERNAL EXAM - LEFT EYE: OS_EXAM: NORMAL

## 2019-09-26 ASSESSMENT — PAIN SCALES - GENERAL: PAINLEVEL: NO PAIN (0)

## 2019-09-26 ASSESSMENT — VISUAL ACUITY
METHOD: SNELLEN - LINEAR
OD_SC+: -1
OS_SC: 3/200E
OD_SC: 20/125
OD_PH_SC: 20/100
OS_PH_SC: 20/300

## 2019-09-26 ASSESSMENT — SLIT LAMP EXAM - LIDS
COMMENTS: NORMAL
COMMENTS: PROTECTIVE PTOSIS

## 2019-09-26 ASSESSMENT — EXTERNAL EXAM - RIGHT EYE: OD_EXAM: NORMAL

## 2019-09-26 NOTE — H&P
Pre-Operative H & P     CC:  Preoperative exam to assess for increased cardiopulmonary risk while undergoing surgery and anesthesia.    Date of Encounter: 9/26/2019  Primary Care Physician:  [facility]  Associated Diagnosis: left retinal detachment    HPI  Mary Carlos is a 74 year old female who presents for pre-operative H & P in preparation for 25 vitrectomy,fluid-gas exchange, membrane peel, endolaser, silicone oil with Dr. Garza on 9/27/2019 at [facility].  Retrobulbar anesthesia.    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    The patient has a history of previous retinal detachment s/p repair.  Detachment recurred with inferior proliferative vitreoretinopahty and patient had subsequent repair.  She has a history of syphilis with ocular involvement and thus retinal detachment.  She has completed treatment with PCN.  She is on a current prednisone taper.    History is obtained from the patient.     Past Medical History  Past Medical History:   Diagnosis Date     Allergic rhinitis      Arthropathy      Breast cancer (H) 2012     Glaucoma      HTN, goal below 140/90      Intestinal malabsorption      Scleritis        Past Surgical History  Past Surgical History:   Procedure Laterality Date     APPENDECTOMY       ARTHROSCOPY KNEE       C BREAST RECONSTRUCT W TRAM 1 PEDICL       C TOTAL KNEE ARTHROPLASTY  2008    Bilateral     CHOLECYSTECTOMY       HYSTERECTOMY TOTAL ABDOMINAL      Benign      MASTECTOMY  2012     VITRECTOMY PARSPLANA WITH 25 GAUGE SYSTEM Left 6/17/2019    Procedure: LEFT EYE 25 GAUGE VITRECTOMY, PARS PLANA LENSECTOMY, MEMBRANE PEEL,  ENDOLASER, POSTERIOR SYNECHIOLYSIS, IRIDOTOMY, AIR FLUID EXCHANGE, INFUSION  SILICONE OIL;  Surgeon: Carolee Garza MD;  Location:  [facility]     VITRECTOMY PARSPLANA WITH 25 GAUGE SYSTEM Left 8/2/2019    Procedure: Left Eye 25 Gauge Parsplana Vitrectomy, Silicone Oil Removal, Membrane Peel, Retinectomy, Endolaser, Air/f;uid exchange, Infusion of 1000 Silicone Oil;  Surgeon: Carolee Garza MD;  Location: [facility] OR       Hx of Blood transfusions/reactions: yes, no reported reaction     Hx of abnormal bleeding or anti-platelet use: none    Menstrual history: No LMP recorded. Patient is postmenopausal.:       Steroid use in the last year: current prednisone taper    Personal or FH with difficulty with Anesthesia:  none    Prior to Admission Medications  Current Outpatient Medications   Medication Sig Dispense Refill     metoprolol succinate ER (TOPROL-XL) 25 MG 24 hr tablet Take 1 tablet (25 mg) by mouth daily (Patient taking differently: Take 25 mg by mouth daily ) 90 tablet 1     prednisoLONE acetate (PRED FORTE) 1 % ophthalmic suspension Place 1-2 drops Into the left eye 4 times daily (Patient taking differently: Place 1-2 drops Into the left eye 2 times daily ) 1 Bottle 3     predniSONE (DELTASONE) 20 MG tablet Take 1 tablet (20 mg) by mouth daily (Patient taking differently: Take 20 mg by mouth every morning ) 90 tablet 1     Blood Pressure Monitoring (BLOOD PRESSURE KIT) KIT 1 kit as needed 1 kit 0     calcium-vitamin D (OSCAL) 250-125 MG-UNIT TABS per tablet Take 1 tablet by mouth 2 times daily        vitamin B6 (PYRIDOXINE) 25 MG tablet Take 1 tablet (25 mg) by mouth daily (Patient taking differently: Take 25 mg by mouth daily ) 30 tablet 0       Allergies  Allergies   Allergen Reactions     Cephalexin      Other reaction(s): Sedation     Codeine Sulfate      Diatrizoate Anaphylaxis     Ibuprofen Sodium      Iodamide      Ivp Dye [Contrast Dye]      Lactose Anaphylaxis     No Clinical Screening - See Comments Anaphylaxis     flu shot     Sulfamethoxazole-Trimethoprim Hives      Other reaction(s): Sedation     Bactrim [Sulfamethoxazole W/Trimethoprim] Hives     Codeine      Other reaction(s): GI Bleeding  Can take hydrocodone     Influenza Virus Vaccine H5n1      Iodine      Other reaction(s): Agitation     Sulfa Drugs Hives     Thimerosal      Other reaction(s): GI Upset     Tramadol Hives     hives       Social History  Social History     Socioeconomic History     Marital status: Single     Spouse name: Not on file     Number of children: Not on file     Years of education: Not on file     Highest education level: Not on file   Occupational History     Not on file   Social Needs     Financial resource strain: Not on file     Food insecurity:     Worry: Not on file     Inability: Not on file     Transportation needs:     Medical: Not on file     Non-medical: Not on file   Tobacco Use     Smoking status: Former Smoker     Last attempt to quit:      Years since quittin.7     Smokeless tobacco: Never Used   Substance and Sexual Activity     Alcohol use: No     Drug use: No     Sexual activity: Never   Lifestyle     Physical activity:     Days per week: Not on file     Minutes per session: Not on file     Stress: Not on file   Relationships     Social connections:     Talks on phone: Not on file     Gets together: Not on file     Attends Restorationist service: Not on file     Active member of club or organization: Not on file     Attends meetings of clubs or organizations: Not on file     Relationship status: Not on file     Intimate partner violence:     Fear of current or ex partner: Not on file     Emotionally abused: Not on file     Physically abused: Not on file     Forced sexual activity: Not on file   Other Topics Concern     Parent/sibling w/ CABG, MI or angioplasty before 65F 55M? Not Asked   Social History Narrative     Not on file       Family History  Family History   Problem Relation Age of Onset     Unknown/Adopted Mother      Unknown/Adopted Father      Glaucoma No family  "hx of      Diabetes No family hx of      Macular Degeneration No family hx of        Anesthesia Evaluation     . Pt has had prior anesthetic. Type: General    No history of anesthetic complications          ROS/MED HX  The complete review of systems is negative other than noted in the HPI or here.  ENT/Pulmonary:     (+)MARQUIS risk factors snores loudly, hypertension, tobacco use, Past use , . .   (-) recent URI   Neurologic:  - neg neurologic ROS     Cardiovascular:     (+) hypertension----. : . . . :. . Previous cardiac testing date:results:date: results: date:2008 results: date: results:          METS/Exercise Tolerance:  3 - Able to walk 1-2 blocks without stopping   Hematologic:     (+) History of Transfusion no previous transfusion reaction -     (-) history of blood clots   Musculoskeletal:  - neg musculoskeletal ROS       GI/Hepatic: Comment: Small bowel diverticulitis with perforation (admit 6/26/19-7/1/19)    (+) Other GI/Hepatic (PUD on triple antibiotic treatment)       Renal/Genitourinary: Comment: H/o elevated creatinine    (+) chronic renal disease,       Endo:     (+) Chronic steroid usage (currently on prednisone taper) for .      Psychiatric:  - neg psychiatric ROS       Infectious Disease: Comment: Latent TB  (+) TB,       Malignancy:   (+) Malignancy History of Breast  Breast CA status post Surgery.         Other:    (+) No chance of pregnancy no H/O Chronic Pain,           PHYSICAL EXAM:   Mental Status/Neuro: A/A/O; Age Appropriate   Airway: Facies: Feasible  Mallampati: I  Mouth/Opening: Full  TM distance: > 6 cm  Neck ROM: Full   Respiratory: Auscultation: CTAB     Resp. Rate: Normal     Resp. Effort: Normal      CV: Rhythm: Regular  Rate: Age appropriate  Heart: Normal Sounds  Edema: None   Comments:         Temp: 98.3  F (36.8  C) Temp src: Oral BP: 126/77 Pulse: 82   Resp: 20 SpO2: 97 %         171 lbs 0 oz  5' 4\"   Body mass index is 29.35 kg/m .       Physical Exam  Constitutional: Awake, " alert, cooperative, no apparent distress, and appears stated age.   Eyes: Eye patch over left ey. Right Pupil round and reactive to light, extra ocular muscles intact, right sclera clear, conjunctiva normal.  HENT: Normocephalic, oral pharynx with moist mucus membranes, fair dentition. No goiter appreciated.   Respiratory: Clear to auscultation bilaterally, no crackles or wheezing.  Cardiovascular: Regular rate and rhythm, normal S1 and S2, and no murmur noted.  Carotids +2, no bruits. No edema. Palpable pulses to radial  DP and PT arteries.   GI: Normal bowel sounds, soft, non-distended, non-tender, no masses palpated.  Lymph/Hematologic: No cervical lymphadenopathy and no supraclavicular lymphadenopathy.  Genitourinary:  deferred  Skin: Warm and dry.  No rashes at anticipated surgical site.   Musculoskeletal: Full ROM of neck. There is no redness, warmth, or swelling of the joints. Gross motor strength is normal.    Neurologic: Awake, alert, oriented to name, place and time. Cranial nerves II-XII are grossly intact. Gait is normal.   Neuropsychiatric: Calm, cooperative. Normal affect.     Labs: (personally reviewed)  Lab Results   Component Value Date    WBC 9.5 07/01/2019     Lab Results   Component Value Date    RBC 2.71 07/01/2019     Lab Results   Component Value Date    HGB 8.3 07/01/2019     Lab Results   Component Value Date    HCT 28.0 07/01/2019     Lab Results   Component Value Date     07/01/2019     Lab Results   Component Value Date    MCH 30.6 07/01/2019     Lab Results   Component Value Date    MCHC 29.6 07/01/2019     Lab Results   Component Value Date    RDW 17.2 07/01/2019     Lab Results   Component Value Date     07/01/2019     Last Comprehensive Metabolic Panel:  Sodium   Date Value Ref Range Status   07/01/2019 142 133 - 144 mmol/L Final     Potassium   Date Value Ref Range Status   07/01/2019 3.7 3.4 - 5.3 mmol/L Final     Chloride   Date Value Ref Range Status   07/01/2019 111  (H) 94 - 109 mmol/L Final     Carbon Dioxide   Date Value Ref Range Status   07/01/2019 28 20 - 32 mmol/L Final     Anion Gap   Date Value Ref Range Status   07/01/2019 2 (L) 3 - 14 mmol/L Final     Glucose   Date Value Ref Range Status   07/01/2019 88 70 - 99 mg/dL Final     Urea Nitrogen   Date Value Ref Range Status   07/01/2019 15 7 - 30 mg/dL Final     Creatinine   Date Value Ref Range Status   07/01/2019 0.89 0.52 - 1.04 mg/dL Final     GFR Estimate   Date Value Ref Range Status   07/01/2019 63 >60 mL/min/[1.73_m2] Final     Comment:     Non  GFR Calc  Starting 12/18/2018, serum creatinine based estimated GFR (eGFR) will be   calculated using the Chronic Kidney Disease Epidemiology Collaboration   (CKD-EPI) equation.       Calcium   Date Value Ref Range Status   07/01/2019 7.4 (L) 8.5 - 10.1 mg/dL Final     Bilirubin Total   Date Value Ref Range Status   06/27/2019 1.0 0.2 - 1.3 mg/dL Final     Alkaline Phosphatase   Date Value Ref Range Status   06/27/2019 74 40 - 150 U/L Final     ALT   Date Value Ref Range Status   06/27/2019 20 0 - 50 U/L Final     AST   Date Value Ref Range Status   06/27/2019 8 0 - 45 U/L Final     CREATININE ISTAT 0.57 - 1.11 mg/dL 1.00    GFR if African American >60 ml/min/1.73m2 >60    GFR if not African American >60 ml/min/1.73m2 54Low       HEMOGLOBIN                12.0 - 16.0 g/dL 11.0Low     MCV                       80 - 100 fL 99            EKG: not indicated    Outside records reviewed from: care everywhere    ASSESSMENT and PLAN  Mary Carlos is a 74 year old female scheduled for 25 vitrectomy,fluid-gas exchange, membrane peel, endolaser, silicone oil on 9/27/2019 by Dr. Garza in treatment of Left Retinal detachment.  PAC referral for risk assessment and optimization for anesthesia with comorbid conditions of hypertension, recent history of small bowel diverticulitis with perforation, latent TB, h/o elevated creatinine, h/o breast cancer s/p mastectomy with  reconstruction 2012:    Pre-operative considerations:  1.  Cardiac:  Functional status- METS 3.  Low risk surgery with 0.4% (RCRI 0) risk of major adverse cardiac event.   2.  Pulm:  Airway feasible.  MARQUIS risk: low  3.  GI:  Risk of PONV score = 2.  If > 2, anti-emetic intervention recommended.  4.  Currently on prednisone taper per opthalmology.  Will take prednisone DOS.    VTE risk: 0.5% (age)    #cardiac  -denies CP, SOB, CUADRA, palpitations  -hypertension, will take metoprolol DOS  -denies any other cardiac history    #pulmonary  -former smoker, quit 2011  -latent TB, started on isoniazid 6/11/2019.      #renal  -h/o elevated creatinine, most recent normal at 1.0 on 8/1/2019      Patient is optimized and is acceptable candidate for the proposed procedure.  No further diagnostic evaluation is needed.           Bambi Cleary PA-C  Preoperative Assessment Center  [facility]  Phone: [phone]  Fax: [phone]

## 2019-09-26 NOTE — NURSING NOTE
Chief Complaints and History of Present Illnesses   Patient presents with     Follow Up     3 week follow up status post PPV/MP/SO/EL left eye  for recurrent RR     Chief Complaint(s) and History of Present Illness(es)     Follow Up     Comments: 3 week follow up status post PPV/MP/SO/EL left eye  for recurrent RR              Comments     Pt states vision is about the same as last visit. No eye pain today. No flashes or floaters.  No redness or dryness.    COLT Parks  September 26, 2019 10:41 AM

## 2019-09-26 NOTE — ANESTHESIA PREPROCEDURE EVALUATION
Anesthesia Pre-Procedure Evaluation    Patient: Mary Carlos   MRN:     [id number] Gender:   female   Age:    74 year old :      1945        Preoperative Diagnosis: * No surgery found *        Past Medical History:   Diagnosis Date     Allergic rhinitis      Arthropathy      Breast cancer (H)      Glaucoma      HTN, goal below 140/90      Intestinal malabsorption      Scleritis       Past Surgical History:   Procedure Laterality Date     APPENDECTOMY       ARTHROSCOPY KNEE       C BREAST RECONSTRUCT W TRAM 1 PEDICL       C TOTAL KNEE ARTHROPLASTY      Bilateral     CHOLECYSTECTOMY       HYSTERECTOMY TOTAL ABDOMINAL      Benign      MASTECTOMY       VITRECTOMY PARSPLANA WITH 25 GAUGE SYSTEM Left 2019    Procedure: LEFT EYE 25 GAUGE VITRECTOMY, PARS PLANA LENSECTOMY, MEMBRANE PEEL,  ENDOLASER, POSTERIOR SYNECHIOLYSIS, IRIDOTOMY, AIR FLUID EXCHANGE, INFUSION SILICONE OIL;  Surgeon: Carolee Garza MD;  [facility]: [facility]     VITRECTOMY PARSPLANA WITH 25 GAUGE SYSTEM Left 2019    Procedure: Left Eye 25 Gauge Parsplana Vitrectomy, Silicone Oil Removal, Membrane Peel, Retinectomy, Endolaser, Air/f;uid exchange, Infusion of 1000 Silicone Oil;  Surgeon: Carolee Garza MD;  Location:  OR          Anesthesia Evaluation     . Pt has had prior anesthetic. Type: General    No history of anesthetic complications          ROS/MED HX    ENT/Pulmonary:     (+)MARQUIS risk factors snores loudly, hypertension, tobacco use, Past use , . .   (-) recent URI   Neurologic:  - neg neurologic ROS     Cardiovascular:     (+) hypertension----. : . . . :. . Previous cardiac testing date:results:date: results: date: results: date: results:          METS/Exercise Tolerance:  3 - Able to walk 1-2 blocks without stopping   Hematologic:     (+) History of Transfusion no previous transfusion reaction -     (-) history of blood clots   Musculoskeletal:  - neg musculoskeletal ROS       GI/Hepatic:  Comment: Small bowel diverticulitis with perforation (admit 6/26/19-7/1/19)    (+) Other GI/Hepatic (PUD on triple antibiotic treatment)       Renal/Genitourinary: Comment: H/o elevated creatinine    (+) chronic renal disease,       Endo:     (+) Chronic steroid usage (currently on prednisone taper) for .      Psychiatric:  - neg psychiatric ROS       Infectious Disease: Comment: Latent TB  (+) TB,       Malignancy:   (+) Malignancy History of Breast  Breast CA status post Surgery.         Other:    (+) No chance of pregnancy no H/O Chronic Pain,                       PHYSICAL EXAM:   Mental Status/Neuro: A/A/O; Age Appropriate   Airway: Facies: Feasible  Mallampati: I  Mouth/Opening: Full  TM distance: > 6 cm  Neck ROM: Full   Respiratory: Auscultation: CTAB     Resp. Rate: Normal     Resp. Effort: Normal      CV: Rhythm: Regular  Rate: Age appropriate  Heart: Normal Sounds  Edema: None   Comments:                      LABS:  CBC:   Lab Results   Component Value Date    WBC 9.5 07/01/2019    WBC 12.2 (H) 06/30/2019    HGB 8.3 (L) 07/01/2019    HGB 8.1 (L) 06/30/2019    HCT 28.0 (L) 07/01/2019    HCT 27.1 (L) 06/30/2019     07/01/2019     06/30/2019     BMP:   Lab Results   Component Value Date     07/01/2019     06/30/2019    POTASSIUM 3.7 07/01/2019    POTASSIUM 3.8 06/30/2019    CHLORIDE 111 (H) 07/01/2019    CHLORIDE 110 (H) 06/30/2019    CO2 28 07/01/2019    CO2 25 06/30/2019    BUN 15 07/01/2019    BUN 16 06/30/2019    CR 0.89 07/01/2019    CR 0.89 06/30/2019    GLC 88 07/01/2019    GLC 86 06/30/2019     COAGS:   Lab Results   Component Value Date    INR 1.09 06/27/2019     POC: No results found for: BGM, HCG, HCGS  OTHER:   Lab Results   Component Value Date    LACT 0.7 06/27/2019    MAGGY 7.4 (L) 07/01/2019    ALBUMIN 1.8 (L) 06/27/2019    PROTTOTAL 4.3 (L) 06/27/2019    ALT 20 06/27/2019    AST 8 06/27/2019    ALKPHOS 74 06/27/2019    BILITOTAL 1.0 06/27/2019    TSH 0.82 01/02/2015  "       Preop Vitals    BP Readings from Last 3 Encounters:   08/02/19 (!) 142/78   07/01/19 149/66   06/17/19 161/79    Pulse Readings from Last 3 Encounters:   08/02/19 65   06/30/19 71   06/17/19 67      Resp Readings from Last 3 Encounters:   08/02/19 20   07/01/19 18   06/17/19 16    SpO2 Readings from Last 3 Encounters:   08/02/19 96%   07/01/19 98%   06/17/19 96%      Temp Readings from Last 1 Encounters:   08/02/19 98.8  F (37.1  C) (Temporal)    Ht Readings from Last 1 Encounters:   08/02/19 1.6 m (5' 3\")      Wt Readings from Last 1 Encounters:   08/02/19 73.5 kg (162 lb)    Estimated body mass index is 28.7 kg/m  as calculated from the following:    Height as of 8/2/19: 1.6 m (5' 3\").    Weight as of 8/2/19: 73.5 kg (162 lb).     LDA:        EDGARG FV AN PLAN NO PONV RULE       PAC Discussion and Assessment    ASA Classification: 2  Case is suitable for: ASC  Anesthetic techniques and relevant risks discussed: PAC Recommendations anesthetic techniques: retrobulbar anesthesia.  Invasive monitoring and risk discussed: No  Types:   Possibility and Risk of blood transfusion discussed: No  NPO instructions given:   Additional anesthetic preparation and risks discussed:   Needs early admission to pre-op area:   Other:     PAC Resident/NP Anesthesia Assessment:  Mary Carlos is a 74 year old female scheduled for 25 vitrectomy,fluid-gas exchange, membrane peel, endolaser, silicone oil on 9/27/2019 by Dr. Garza in treatment of Left Retinal detachment.  PAC referral for risk assessment and optimization for anesthesia with comorbid conditions of hypertension, recent history of small bowel diverticulitis with perforation, latent TB, h/o elevated creatinine, h/o breast cancer s/p mastectomy with reconstruction 2012:    Pre-operative considerations:  1.  Cardiac:  Functional status- METS 3.  Low risk surgery with 0.4% (RCRI 0) risk of major adverse cardiac event.   2.  Pulm:  Airway feasible.  MARQUIS risk: low  3.  GI:  " Risk of PONV score = 2.  If > 2, anti-emetic intervention recommended.  4.  Currently on prednisone taper per opthalmology.  Will take prednisone DOS.    VTE risk: 0.5% (age)    #cardiac  -denies CP, SOB, CUADRA, palpitations  -hypertension, will take metoprolol DOS  -denies any other cardiac history    #pulmonary  -former smoker, quit 2011  -latent TB, started on isoniazid 6/11/2019.      #renal  -h/o elevated creatinine, most recent normal at 1.0 on 8/1/2019      Patient is optimized and is acceptable candidate for the proposed procedure.  No further diagnostic evaluation is needed.         **For further details of assessment, testing, and physical exam please see H and P completed on same date.          Bambi Cleary PA-C, [facility]      Reviewed and Signed by PAC Mid-Level Provider/Resident  Mid-Level Provider/Resident: Bambi Cleary  Date: 9/26/2019  Time:     Attending Anesthesiologist Anesthesia Assessment:        Anesthesiologist:   Date:   Time:   Pass/Fail:   Disposition:     PAC Pharmacist Assessment:        Pharmacist:   Date:   Time:    Bambi Cleary PA-C

## 2019-09-26 NOTE — PROGRESS NOTES
CC: status post PPV/MP/SO/EL left eye  for recurrent RRD s/p PPV/lensectomy/MP/EL/Posterior synechiolysis/Iris hooks/retinectomy/PFO/AFX/SO left eye 8.2.19    Inf macula area with recurrent Retinal detachment  And proliferative vitreoretinopathy     Optical Coherence Tomography: 9-26-19   Right eye: good foveal contour  Left eye: subretinal fluid inf to macula and cystic changes   optos pic 09/05/19 consistent with exam  Autofluorescence 09/05/19 : left eye peripheral hypoautofluorescence of the laser scars and hypoautofluorescence of the inf macula area of subretinal fluid     Plan:  Plan for 25g Pars plana vitrectomy (PPV)/ Membrane peel/ endolaser /Silicone Oil remova/ Silicone Oil placement  General anesthesia  Retina detachment and endophthalmitis precautions were discussed with the patient and was asked to return if any of the those occur    Medications to operative eye  maxitrol ointment at bedtime till finish  Taper Pred Forte  twice a day   Discontinue prednisolone oral    ~~~~~~~~~~~~~~~~~~~~~~~~~~~~~~~~~~   Complete documentation of historical and exam elements from today's encounter can be found in the full encounter summary report (not reduplicated in this progress note).  I personally obtained the chief complaint(s) and history of present illness.  I confirmed and edited as necessary the review of systems, past medical/surgical history, family history, social history, and examination findings as documented by others; and I examined the patient myself.  I personally reviewed the relevant tests, images, and reports as documented above.  I formulated and edited as necessary the assessment and plan and discussed the findings and management plan with the patient and family    Carolee Garza MD  .  Retina Service   Department of Ophthalmology and Visual Neurosciences   [facility]  Phone: [phone]   Fax: [phone]

## 2019-09-26 NOTE — PATIENT INSTRUCTIONS
Preparing for Your Surgery      Name:  Mary Carlos   MRN:  [id number]   :  1945   Today's Date:  2019     Arriving for surgery:  Surgery date:  2019  Arrival time:  6:30 am  Please come to:     [email or web address]  [address] [place] [id number]     Parking is available in front of the Clinics and Surgery Center building from 5:30AM to 8:00PM.  -  Proceed to the 5th floor to check into the Ambulatory Surgery Center.              >> There will be patient concierges on the 1st and 5th floor, for assistance or an escort, if you would like.              >> Please call [phone] with any questions.    What can I eat or drink?  -  You may have solid food or milk products until 8 hours prior to your surgery.(midnight)  -  You may have water, apple juice or 7up/Sprite until 2 hours prior to your surgery.(6 am)    Which medicines can I take?        Stop Aspirin, vitamins and supplements one week prior to surgery.      Hold Ibuprofen for 24 hours and/or Naproxen for 48 hours prior to surgery.     -  Please take these medications the day of surgery:  All usual am prescription medications  Eye drops as directed      How do I prepare myself?  -  Take two showers: one the night before surgery; and one the morning of surgery.         Use Scrubcare or Hibiclens to wash from neck down, leave soap on your skin for up to one minute.  Do not get soap in your eyes or ears.  You may use your own shampoo and conditioner; no other hair products.   -  Do NOT use lotion, powder, deodorant, or antiperspirant the day of your surgery.  -  Do NOT wear any makeup, fingernail polish or jewelry.  - Do not bring your own medications to the hospital, except for inhalers and eye   drops.  -  Bring your ID and insurance card.      -If you are scheduled to go home the Same Day as surgery you must have a responsible adult as a  and to stay with you overnight the first 24 hours after  surgery.     Questions or Concerns:      -Please call the Ambulatory Surgery Center at  [phone].    -For questions after surgery please call your surgeons office.

## 2019-09-27 ENCOUNTER — ANESTHESIA EVENT (OUTPATIENT)
Dept: SURGERY | Facility: AMBULATORY SURGERY CENTER | Age: 74
End: 2019-09-27

## 2019-09-27 ENCOUNTER — HOSPITAL ENCOUNTER (OUTPATIENT)
Facility: AMBULATORY SURGERY CENTER | Age: 74
End: 2019-09-27
Attending: OPHTHALMOLOGY
Payer: MEDICARE

## 2019-09-27 ENCOUNTER — ANESTHESIA (OUTPATIENT)
Dept: SURGERY | Facility: AMBULATORY SURGERY CENTER | Age: 74
End: 2019-09-27

## 2019-09-27 VITALS
RESPIRATION RATE: 18 BRPM | HEART RATE: 81 BPM | DIASTOLIC BLOOD PRESSURE: 75 MMHG | BODY MASS INDEX: 29.19 KG/M2 | SYSTOLIC BLOOD PRESSURE: 140 MMHG | WEIGHT: 171 LBS | HEIGHT: 64 IN | TEMPERATURE: 97.7 F | OXYGEN SATURATION: 95 %

## 2019-09-27 DIAGNOSIS — H33.052 RECENT RETINAL DETACHMENT OF LEFT EYE, TOTAL/SUBTOTAL: ICD-10-CM

## 2019-09-27 DEVICE — IMPLANTABLE DEVICE: Type: IMPLANTABLE DEVICE | Site: EYE | Status: FUNCTIONAL

## 2019-09-27 RX ORDER — SODIUM CHLORIDE, SODIUM LACTATE, POTASSIUM CHLORIDE, CALCIUM CHLORIDE 600; 310; 30; 20 MG/100ML; MG/100ML; MG/100ML; MG/100ML
INJECTION, SOLUTION INTRAVENOUS CONTINUOUS
Status: DISCONTINUED | OUTPATIENT
Start: 2019-09-27 | End: 2019-09-27 | Stop reason: HOSPADM

## 2019-09-27 RX ORDER — ONDANSETRON 2 MG/ML
INJECTION INTRAMUSCULAR; INTRAVENOUS PRN
Status: DISCONTINUED | OUTPATIENT
Start: 2019-09-27 | End: 2019-09-27

## 2019-09-27 RX ORDER — LIDOCAINE 40 MG/G
CREAM TOPICAL
Status: DISCONTINUED | OUTPATIENT
Start: 2019-09-27 | End: 2019-09-27 | Stop reason: HOSPADM

## 2019-09-27 RX ORDER — PROPOFOL 10 MG/ML
INJECTION, EMULSION INTRAVENOUS PRN
Status: DISCONTINUED | OUTPATIENT
Start: 2019-09-27 | End: 2019-09-27

## 2019-09-27 RX ORDER — PROPARACAINE HYDROCHLORIDE 5 MG/ML
1 SOLUTION/ DROPS OPHTHALMIC ONCE
Status: COMPLETED | OUTPATIENT
Start: 2019-09-27 | End: 2019-09-27

## 2019-09-27 RX ORDER — PHENYLEPHRINE HYDROCHLORIDE 25 MG/ML
1 SOLUTION/ DROPS OPHTHALMIC
Status: COMPLETED | OUTPATIENT
Start: 2019-09-27 | End: 2019-09-27

## 2019-09-27 RX ORDER — ATROPINE SULFATE 10 MG/ML
SOLUTION/ DROPS OPHTHALMIC PRN
Status: DISCONTINUED | OUTPATIENT
Start: 2019-09-27 | End: 2019-09-27 | Stop reason: HOSPADM

## 2019-09-27 RX ORDER — DEXAMETHASONE SODIUM PHOSPHATE 4 MG/ML
INJECTION, SOLUTION INTRA-ARTICULAR; INTRALESIONAL; INTRAMUSCULAR; INTRAVENOUS; SOFT TISSUE PRN
Status: DISCONTINUED | OUTPATIENT
Start: 2019-09-27 | End: 2019-09-27 | Stop reason: HOSPADM

## 2019-09-27 RX ORDER — NEOMYCIN POLYMYXIN B SULFATES AND DEXAMETHASONE 3.5; 10000; 1 MG/ML; [USP'U]/ML; MG/ML
1 SUSPENSION/ DROPS OPHTHALMIC 4 TIMES DAILY
Qty: 5 ML | Refills: 0 | Status: SHIPPED | OUTPATIENT
Start: 2019-09-27 | End: 2020-01-15

## 2019-09-27 RX ORDER — DEXAMETHASONE SODIUM PHOSPHATE 4 MG/ML
INJECTION, SOLUTION INTRA-ARTICULAR; INTRALESIONAL; INTRAMUSCULAR; INTRAVENOUS; SOFT TISSUE PRN
Status: DISCONTINUED | OUTPATIENT
Start: 2019-09-27 | End: 2019-09-27

## 2019-09-27 RX ORDER — FENTANYL CITRATE 50 UG/ML
INJECTION, SOLUTION INTRAMUSCULAR; INTRAVENOUS PRN
Status: DISCONTINUED | OUTPATIENT
Start: 2019-09-27 | End: 2019-09-27

## 2019-09-27 RX ORDER — CYCLOPENTOLATE HYDROCHLORIDE 10 MG/ML
1 SOLUTION/ DROPS OPHTHALMIC
Status: COMPLETED | OUTPATIENT
Start: 2019-09-27 | End: 2019-09-27

## 2019-09-27 RX ORDER — BALANCED SALT SOLUTION 6.4; .75; .48; .3; 3.9; 1.7 MG/ML; MG/ML; MG/ML; MG/ML; MG/ML; MG/ML
SOLUTION OPHTHALMIC PRN
Status: DISCONTINUED | OUTPATIENT
Start: 2019-09-27 | End: 2019-09-27 | Stop reason: HOSPADM

## 2019-09-27 RX ORDER — PROPOFOL 10 MG/ML
INJECTION, EMULSION INTRAVENOUS CONTINUOUS PRN
Status: DISCONTINUED | OUTPATIENT
Start: 2019-09-27 | End: 2019-09-27

## 2019-09-27 RX ORDER — EPHEDRINE SULFATE 50 MG/ML
INJECTION, SOLUTION INTRAMUSCULAR; INTRAVENOUS; SUBCUTANEOUS PRN
Status: DISCONTINUED | OUTPATIENT
Start: 2019-09-27 | End: 2019-09-27

## 2019-09-27 RX ADMIN — PROPOFOL 180 MG: 10 INJECTION, EMULSION INTRAVENOUS at 08:16

## 2019-09-27 RX ADMIN — CYCLOPENTOLATE HYDROCHLORIDE 1 DROP: 10 SOLUTION/ DROPS OPHTHALMIC at 07:36

## 2019-09-27 RX ADMIN — SODIUM CHLORIDE, SODIUM LACTATE, POTASSIUM CHLORIDE, CALCIUM CHLORIDE: 600; 310; 30; 20 INJECTION, SOLUTION INTRAVENOUS at 07:37

## 2019-09-27 RX ADMIN — PROPOFOL 150 MCG/KG/MIN: 10 INJECTION, EMULSION INTRAVENOUS at 08:16

## 2019-09-27 RX ADMIN — FENTANYL CITRATE 12.5 MCG: 50 INJECTION, SOLUTION INTRAMUSCULAR; INTRAVENOUS at 10:02

## 2019-09-27 RX ADMIN — PHENYLEPHRINE HYDROCHLORIDE 1 DROP: 25 SOLUTION/ DROPS OPHTHALMIC at 07:42

## 2019-09-27 RX ADMIN — CYCLOPENTOLATE HYDROCHLORIDE 1 DROP: 10 SOLUTION/ DROPS OPHTHALMIC at 07:30

## 2019-09-27 RX ADMIN — Medication 100 MCG: at 08:24

## 2019-09-27 RX ADMIN — DEXAMETHASONE SODIUM PHOSPHATE 4 MG: 4 INJECTION, SOLUTION INTRA-ARTICULAR; INTRALESIONAL; INTRAMUSCULAR; INTRAVENOUS; SOFT TISSUE at 08:16

## 2019-09-27 RX ADMIN — ONDANSETRON 4 MG: 2 INJECTION INTRAMUSCULAR; INTRAVENOUS at 08:16

## 2019-09-27 RX ADMIN — PROPOFOL: 10 INJECTION, EMULSION INTRAVENOUS at 09:02

## 2019-09-27 RX ADMIN — SODIUM CHLORIDE, SODIUM LACTATE, POTASSIUM CHLORIDE, CALCIUM CHLORIDE: 600; 310; 30; 20 INJECTION, SOLUTION INTRAVENOUS at 07:43

## 2019-09-27 RX ADMIN — FENTANYL CITRATE 12.5 MCG: 50 INJECTION, SOLUTION INTRAMUSCULAR; INTRAVENOUS at 09:33

## 2019-09-27 RX ADMIN — Medication 200 MCG: at 08:20

## 2019-09-27 RX ADMIN — FENTANYL CITRATE 12.5 MCG: 50 INJECTION, SOLUTION INTRAMUSCULAR; INTRAVENOUS at 09:53

## 2019-09-27 RX ADMIN — FENTANYL CITRATE 50 MCG: 50 INJECTION, SOLUTION INTRAMUSCULAR; INTRAVENOUS at 08:09

## 2019-09-27 RX ADMIN — Medication 200 MCG: at 08:56

## 2019-09-27 RX ADMIN — EPHEDRINE SULFATE 5 MG: 50 INJECTION, SOLUTION INTRAMUSCULAR; INTRAVENOUS; SUBCUTANEOUS at 08:36

## 2019-09-27 RX ADMIN — CYCLOPENTOLATE HYDROCHLORIDE 1 DROP: 10 SOLUTION/ DROPS OPHTHALMIC at 07:42

## 2019-09-27 RX ADMIN — PHENYLEPHRINE HYDROCHLORIDE 1 DROP: 25 SOLUTION/ DROPS OPHTHALMIC at 07:30

## 2019-09-27 RX ADMIN — PROPARACAINE HYDROCHLORIDE 1 DROP: 5 SOLUTION/ DROPS OPHTHALMIC at 07:30

## 2019-09-27 RX ADMIN — PHENYLEPHRINE HYDROCHLORIDE 1 DROP: 25 SOLUTION/ DROPS OPHTHALMIC at 07:36

## 2019-09-27 RX ADMIN — FENTANYL CITRATE 12.5 MCG: 50 INJECTION, SOLUTION INTRAMUSCULAR; INTRAVENOUS at 09:45

## 2019-09-27 RX ADMIN — Medication 100 MCG: at 08:37

## 2019-09-27 RX ADMIN — Medication 100 MCG: at 08:45

## 2019-09-27 ASSESSMENT — LIFESTYLE VARIABLES: TOBACCO_USE: 1

## 2019-09-27 ASSESSMENT — MIFFLIN-ST. JEOR: SCORE: 1255.65

## 2019-09-27 NOTE — DISCHARGE INSTRUCTIONS
[facility]  [phone]  Post Operative Eye Surgery Instructions    MD Carolee Garcia MD  Will I have pain?  Some discomfort is normal and expected following surgery. The first few days after surgery you may need to use prescription pain pills. Taking Tylenol (acetaminophen) regularly may help prevent pain.  Discomfort should gradually decrease and Tylenol should be sufficient to relieve pain. A foreign body sensation in the cornea of the eye is very common and caused by sutures placed at surgery. These sutures will go away in one to two weeks. If the pain worsens, you should call the doctor.  Do I need to wear an eye patch?  You do not need to wear an eye patch at home after the doctor has removed the patch on your first day after surgery. However, you may be more comfortable wearing a patch outside in the sun, when sleeping or napping, or in a maria, windy environment.  How much drainage should I have?  You may expect a moderate amount of drainage for a week. Gradually the drainage should decrease. The lids can be cleaned with a clean washcloth and gentle soap or diluted baby shampoo. Wipe the eyelids gently from the nose outward. Some blood in the tears is a normal finding.  Will there be swelling?  Some swelling is normal for about a week or two after which it will gradually decrease. Applying a cool compress, using a clean washcloth for 5 - 10 minutes several times a day may reduce the swelling and make you more comfortable. People may have some swelling of both eyes, especially if face down positioning is required. The white part of the eye may appear very red or bloody for a week or two. This may get worse a few days after surgery. Though the bright red appearance can look frightening, it is a normal finding early after surgery and will resolve in a few weeks.  Will I need to use eye drops?  You will be using several different kinds of eye drops or ointment (salve) when you  leave the hospital. The directions will be on each bottle or tube. The medication with the red top will keep your eye dilated and may make your eye more sensitive to light. Wearing sunglasses may help. The other medication is a combination antibiotic/steroid to prevent infection and promote healing.  Occasionally a third drop is used to control the pressure in your eye. A new bottle of artificial tears or lubricant ointment may be used along with your prescription eye drops after surgery. You will be using drops from four to eight weeks. Bring all eye medications (drops. ointments, or pills) with you  to each visit.   Always wash your hands before putting in the eye drops. You may wish to have someone else help you. Pull down on the lower lid and squeeze one drop from the bottle being careful not to touch the dropper to your eye or eyelid. One drop is sufficient, but another may be used if the first did not go into the eye. It is often easier to put in the drops if you are reclining or lying down. Wait five (5) minutes after the first drop before using the second drop to allow the medications to absorb into the eye.  How long will it take for my vision to improve?  Your vision should gradually improve, but it may take up to six months to regain your best vision. Frequently, air or gas bubbles are injected into the eye at the time of surgery. This will blur your vision significantly at first. As the bubble becomes smaller it will cause a black line in your vision that moves as you move your head. As the bubble becomes smaller you may notice that it looks more like a bubble or that it will break up into several smaller bubbles. It will take from a few days to a few weeks for the bubble to dissolve and be replaced by clear fluid.  You may notice floaters or double vision after your surgery. These symptoms usually will decrease with time. If the double vision is bothersome patching the eye may help.  If you notice a  sudden worsening in your vision call your doctor.  Are there any physical restrictions after surgery?  If an air or gas bubble was placed in the eye during surgery, you will be asked to spend most of your time (both awake and during the night) with your head in a specific position, frequently face down. As the eye heals and the bubble dissolves there will be less of a need for you to stay in that specific position. You should avoid sleeping on your back until the bubble has totally dissolved and you have been given permission from your surgeon. You should not fly in an airplane or go to high altitudes in the mountains while there is a bubble in your eye. If you should require any other surgery, under general anesthesia, while you still have an air bubble in your eye, have your surgeon or anesthetist contact us prior to your surgery. Some anesthetic agents can make the bubble expand and seriously damage your eye.  Patients that have a gas/air bubble placed in their eye will have a green medical alert band on their wrist.  This band should not be removed until the doctor removes it for you or gives you permission to remove it.     Heavy lifting (greater than 50 pounds), swimming and contact sports should be avoided for about 3 to 4 weeks after surgery.  You may resume your usual sexual activities about one week after surgery.  When may I return to work or my normal activities?  Depending on the type or work, you may return to work within a few days. If your work involves physical activity or driving, you will need to restrict your activities and remain home longer.  You may watch television, look at magazines, or work puzzles. Reading may be uncomfortable for several days, but using the eyes will not cause any damage.  You may go outside as usual. If conditions are windy or maria, wear an eye pad to avoid getting dust or dirt in the eye.  Can I travel?  You cannot fly in an airplane or drive into the mountains as long  as the air or gas bubble remains in your eye.    Are there any driving restrictions?  Someone will need to drive you home from the hospital. Generally driving can be resumed in several days if you have good vision in your other eye. If you do not feel comfortable driving, do not drive! Your depth perception will be decreased so you will want to try driving during the day in light traffic until you feel comfortable driving. You should restrict your driving while you are taking prescription pain pills as they also can affect your judgment.  When can I shower and wash my hair?  You may shower or bathe when you get home, but avoid getting water in your eye. You may want someone to help you shampoo your hair at first.  You may shave, brush your teeth, or comb your hair. Do not use make-up, mascara, or creams/lotions around your eye for several weeks.  When will I see the doctor again?  Generally, you will be seen the first day after surgery and again 1-2 weeks later. If you have not received a return appointment before leaving the hospital, you should call our office during the business hours to arrange an appointment. If you will be seeing your local doctor instead of us, you will need to call that office to set up an appointment.    If you have a green armband on, your physician will instruct you as to how long you will have to wear it at your post-operative follow-up appointment.  How do I reach a doctor if I have concerns?  One of our doctors is available by calling [facility] [phone]. Please try to call for routine questions and prescription refills during business hours.  You should call your doctor if:  You notice a sudden decrease in your vision.  Have severe pain or pain increases rather than subsiding.  You notice a new black curtain over your eye that is not the gas bubble. If you have any of these symptoms, you may need to be examined.      [facility]  Home Care Following Anesthesia  For 24 hours after surgery:  1. Get plenty of rest.  A responsible adult must stay with you for at least 24 hours after you leave the surgery center.  2. Do not drive or use heavy equipment.  If you have weakness or tingling, don't drive or use heavy equipment until this feeling goes away.   3. Do not drink alcohol.   4. Avoid strenuous or risky activities.  Ask for help when climbing stairs.  5. You may feel lightheaded.  IF so, sit for a few minutes before standing.  Have someone help you get up.   6. If you have nausea (feel sick to your stomach): Drink only clear liquids such as apple juice, ginger ale, broth or 7-Up.  Rest may also help.  Be sure to drink enough fluids.  Move to a regular diet as you feel able.   7. You may have a slight fever.  Call the doctor if your fever is over 100 F (37.7 C) (taken under the tongue) or lasts longer than 24 hours.  8. You may have a dry mouth, a sore throat, muscle aches or trouble sleeping. These should go away after 24 hours.  9. Do not make important or legal decisions.               Tips for taking pain medications  To get the best pain relief possible, remember these points:    Take pain medications as directed, before pain becomes severe.    Pain medication can upset your stomach: taking it with food may help.    Constipation is a common side effect of pain medication. Drink plenty of  fluids.    Eat foods high in fiber. Take a stool softener if recommended by your doctor or pharmacist.    Do not drink alcohol, drive or operate machinery while taking pain medications.    Ask about other ways to control pain, such as with heat, ice or relaxation.    Tylenol/Acetaminophen Consumption  To help encourage the safe use of acetaminophen, the makers of TYLENOL  have lowered the maximum daily dose for single-ingredient Extra Strength TYLENOL  (acetaminophen) products sold in the [place] from 8 pills per day (4,000 mg) to 6 pills  per day (3,000 mg). The dosing interval has also changed from 2 pills every 4-6 hours to 2 pills every 6 hours.    If you feel your pain relief is insufficient, you may take Tylenol/Acetaminophen in addition to your narcotic pain medication.     Be careful not to exceed 3,000 mg of Tylenol/Acetaminophen in a 24 hour period from all sources.    If you are taking extra strength Tylenol/acetaminophen (500 mg), the maximum dose is 6 tablets in 24 hours.    If you are taking regular strength acetaminophen (325 mg), the maximum dose is 9 tablets in 24 hours.    Call a doctor for any of the followin. Signs of infection (fever, growing tenderness at the surgery site, a large amount of drainage or bleeding, severe pain, foul-smelling drainage, redness, swelling).  2. It has been over 8 to 10 hours since surgery and you are still not able to urinate (pass water).  3. Headache for over 24 hours.  4. Numbness, tingling or weakness the day after surgery (if you had spinal anesthesia).  Your doctor is:       Dr. Carolee Garza, Ophthalmology: [phone]               Or dial [phone] and ask for the resident on call for:  Ophthalmology  For emergency care, call the:  [facility]:  [phone] (TTY for hearing impaired: [phone])

## 2019-09-27 NOTE — ANESTHESIA PREPROCEDURE EVALUATION
Anesthesia Pre-Procedure Evaluation    Patient: Mary Carlos   MRN:     [id number] Gender:   female   Age:    74 year old :      1945        Preoperative Diagnosis: Recent retinal detachment of left eye, total/subtotal [H33.052]   Procedure(s):  Left eye 25 Gauge Parsplana Vitrectomy, Silisone Oil Removal, Membrane Peel, Endolaser, Air/fluid Exchange, Infusion of 1000 Silicone Oil     Past Medical History:   Diagnosis Date     Allergic rhinitis      Arthropathy      Breast cancer (H)      Glaucoma      HTN, goal below 140/90      Intestinal malabsorption      Scleritis       Past Surgical History:   Procedure Laterality Date     APPENDECTOMY       ARTHROSCOPY KNEE       C BREAST RECONSTRUCT W TRAM 1 PEDICL       C TOTAL KNEE ARTHROPLASTY      Bilateral     CHOLECYSTECTOMY       HYSTERECTOMY TOTAL ABDOMINAL      Benign      MASTECTOMY       VITRECTOMY PARSPLANA WITH 25 GAUGE SYSTEM Left 2019    Procedure: LEFT EYE 25 GAUGE VITRECTOMY, PARS PLANA LENSECTOMY, MEMBRANE PEEL,  ENDOLASER, POSTERIOR SYNECHIOLYSIS, IRIDOTOMY, AIR FLUID EXCHANGE, INFUSION SILICONE OIL;  Surgeon: Carolee Garza MD;  Location: [facility]     VITRECTOMY PARSPLANA WITH 25 GAUGE SYSTEM Left 2019    Procedure: Left Eye 25 Gauge Parsplana Vitrectomy, Silicone Oil Removal, Membrane Peel, Retinectomy, Endolaser, Air/f;uid exchange, Infusion of 1000 Silicone Oil;  Surgeon: Carolee Garza MD;  Location:  OR          Anesthesia Evaluation     . Pt has had prior anesthetic. Type: General    No history of anesthetic complications          ROS/MED HX    ENT/Pulmonary:  - neg pulmonary ROS   (+)tobacco use, Past use , . .    Neurologic:  - neg neurologic ROS     Cardiovascular:     (+) hypertension----. : . . . :. .       METS/Exercise Tolerance:  4 - Raking leaves, gardening   Hematologic:  - neg hematologic  ROS       Musculoskeletal:  - neg musculoskeletal ROS       GI/Hepatic:  - neg GI/hepatic ROS        Renal/Genitourinary:  - ROS Renal section negative       Endo:  - neg endo ROS       Psychiatric:  - neg psychiatric ROS       Infectious Disease:  - neg infectious disease ROS       Malignancy:   (+) Malignancy           Other:                         PHYSICAL EXAM:   Mental Status/Neuro: A/A/O   Airway: Facies: Feasible  Mallampati: I  Mouth/Opening: Full  TM distance: > 6 cm  Neck ROM: Full   Respiratory: Auscultation: CTAB     Resp. Rate: Normal     Resp. Effort: Normal      CV: Rhythm: Regular  Rate: Age appropriate  Heart: Normal Sounds  Edema: None   Comments:      Dental: Normal Dentition                LABS:  CBC:   Lab Results   Component Value Date    WBC 9.5 07/01/2019    WBC 12.2 (H) 06/30/2019    HGB 8.3 (L) 07/01/2019    HGB 8.1 (L) 06/30/2019    HCT 28.0 (L) 07/01/2019    HCT 27.1 (L) 06/30/2019     07/01/2019     06/30/2019     BMP:   Lab Results   Component Value Date     07/01/2019     06/30/2019    POTASSIUM 3.7 07/01/2019    POTASSIUM 3.8 06/30/2019    CHLORIDE 111 (H) 07/01/2019    CHLORIDE 110 (H) 06/30/2019    CO2 28 07/01/2019    CO2 25 06/30/2019    BUN 15 07/01/2019    BUN 16 06/30/2019    CR 0.89 07/01/2019    CR 0.89 06/30/2019    GLC 88 07/01/2019    GLC 86 06/30/2019     COAGS:   Lab Results   Component Value Date    INR 1.09 06/27/2019     POC: No results found for: BGM, HCG, HCGS  OTHER:   Lab Results   Component Value Date    LACT 0.7 06/27/2019    MAGGY 7.4 (L) 07/01/2019    ALBUMIN 1.8 (L) 06/27/2019    PROTTOTAL 4.3 (L) 06/27/2019    ALT 20 06/27/2019    AST 8 06/27/2019    ALKPHOS 74 06/27/2019    BILITOTAL 1.0 06/27/2019    TSH 0.82 01/02/2015        Preop Vitals    BP Readings from Last 3 Encounters:   09/27/19 (!) 156/84   09/26/19 126/77   08/02/19 (!) 142/78    Pulse Readings from Last 3 Encounters:   09/27/19 71   09/26/19 82   08/02/19 65      Resp Readings from Last 3 Encounters:   09/27/19 19   09/26/19 20   08/02/19 20    SpO2 Readings from  "Last 3 Encounters:   09/27/19 99%   09/26/19 97%   08/02/19 96%      Temp Readings from Last 1 Encounters:   09/27/19 36.5  C (97.7  F) (Temporal)    Ht Readings from Last 1 Encounters:   09/27/19 1.626 m (5' 4\")      Wt Readings from Last 1 Encounters:   09/27/19 77.6 kg (171 lb)    Estimated body mass index is 29.35 kg/m  as calculated from the following:    Height as of this encounter: 1.626 m (5' 4\").    Weight as of this encounter: 77.6 kg (171 lb).     LDA:  Peripheral IV 09/27/19 Right Hand (Active)   Site Assessment WDL 9/27/2019 10:20 AM   Line Status Infusing 9/27/2019 10:20 AM   Phlebitis Scale 0-->no symptoms 9/27/2019 10:20 AM   Infiltration Scale 0 9/27/2019 10:20 AM   Extravasation? No 9/27/2019  7:37 AM   Dressing Intervention New dressing  9/27/2019  7:37 AM   Number of days: 0        Assessment:   ASA SCORE: 2    H&P: History and physical reviewed and following examination; no interval change.   Smoking Status:  Non-Smoker/Unknown   NPO Status: NPO Appropriate     Plan:   Anes. Type:  General   Pre-Medication: None   Induction:  IV (Standard)   Airway: LMA   Access/Monitoring: PIV   Maintenance: TIVA     Postop Plan:   Postop Pain: Opioids  Postop Sedation/Airway: Not planned  Disposition: Outpatient     PONV Management:   Adult Risk Factors: Female, Non-Smoker, Postop Opioids   Prevention: Ondansetron, No Volatiles     CONSENT: Direct conversation   Plan and risks discussed with: Patient                      Ruiz Pugh MD, MD  "

## 2019-09-27 NOTE — ANESTHESIA CARE TRANSFER NOTE
Patient: Mary Carlos    Procedure(s):  Left eye 25 Gauge Parsplana Vitrectomy, Silisone Oil Removal, Membrane Peel, Endolaser, Air/fluid Exchange, Infusion of 1000 Silicone Oil    Diagnosis: Recent retinal detachment of left eye, total/subtotal [H33.052]  Diagnosis Additional Information: No value filed.    Anesthesia Type:   No value filed.     Note:  Airway :Face Mask  Patient transferred to:PACU  Comments: Uneventful transport to PACU wtith FM 6 lpm  Report to RN  Pt comfortable  Exchanging well; color natl  Pt responds appropriately to command  IV patent  Lips/teeth/dentition as preop status  Questions answered  /84  HR 75  TAT 97.5  RR 16  Sat 99%Handoff Report: Identifed the Patient, Identified the Reponsible Provider, Reviewed the pertinent medical history, Discussed the surgical course, Reviewed Intra-OP anesthesia mangement and issues during anesthesia, Set expectations for post-procedure period and Allowed opportunity for questions and acknowledgement of understanding      Vitals: (Last set prior to Anesthesia Care Transfer)    CRNA VITALS  9/27/2019 0945 - 9/27/2019 1022      9/27/2019             Resp Rate (set):  10                Electronically Signed By: ROLANDO WAGNER CRNA  September 27, 2019  10:22 AM

## 2019-09-27 NOTE — BRIEF OP NOTE
[facility] Brief Operative Note    Pre-operative diagnosis: Recurrent retinal detachment of left eye, total/subtotal [H33.052]   Post-operative diagnosis same   Procedure: Procedure(s):  Left eye 25 Gauge Parsplana Vitrectomy, Silisone Oil Removal, Membrane Peel, Endolaser, Air/fluid Exchange, Infusion of 1000 Silicone Oil   Surgeon(s): Surgeon(s) and Role:     * Carolee Garza MD - Primary     * Jean Hernandez MD - Resident - Assisting   Estimated blood loss: * No values recorded between 9/27/2019  8:33 AM and 9/27/2019 10:16 AM *    Specimens: * No specimens in log *   Findings: Retinal detachment  Macula off

## 2019-09-27 NOTE — OP NOTE
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 25 gauge pars plana vitectomy, membrane peel, retinectomy, endolaser, perfluoroctane liquid (PFO)-silicone oil exchange (1000 cs) left eye   ANESTHESIA: general anesthesia and peribulbar block   COMPLICATIONS: none   INDICATIONS: Mary Carlos is a 74 year old year old year old patient with diagnosis of recurrent subtotal retinal detachment left eye with proliferative vitreoretinopathy.the patient is here for Retinal detachment Repair.   DESCRIPTION OF THE PROCEDURE   The patient was taken to the operating room where general anesthesia was administered by the anesthesia department and a peribulbar block consisting of a 1:1 mixture of 2%lidocaine and 0.75% marcaine with epinephrine and wydase, was administered to the operative eye with adequate anesthesia and akinesia.    The eye was prepped and draped in the usual sterile surgical fashion for ophthalmic surgery, including the installation of one drop of 5% Povidone Iodine.  A sterile drape was placed over the face and body and a lid speculum was inserted.  The microscope was brought to the operative field. It was noted that the patient had superior areas of scleral thinning with scleromalasia and 360 peripheral cornea opacities with neovascularization. In addition there was poor dilation of the pupil. A temporal cornea suture was cut and removed.   Marks were made on the sclera inferotemporally, superotemporally, and superonasally 3.5 mm posterior to the limbus. The 25g transscleral cannulas were inserted through the sclera using the trocars. The infusion cannula was connected inferotemporally and directly visualized to verify it was in the correct location. The Silicone Oil canula was placed and the Silicone Oil was removed. The vitrector  handpiece and endoilluiminator were placed in the eye. Upon entering the eye it was noted that the patient had subtotal retinal detachment with inferior proliferative vitreoretinopathy.   A Pars plana vitrctomy (PPV) was performed and the vitreous cavity was stained with kenalog. Peripheral vitrectomy was assisted with scleral depression. brilliant blue was used to stain the membranes and internal limiting membrane; using a microtano forceps and max  forceps the Epiretinal membrane and internal limiting membrane were removed without  Complications. Next endocautery was applied to the inferior peripheral retina and an inferior retinectomy was performed (approximately 5 clock hours). Next, perfluoroctane liquid (PFO) was placed over the macula and retina was flattened completely.  Endolaser was applied in 3 overlapping rows inferior periphery posterior to the retinectomy and 360 degrees peripherally. The inferior iridotomy was enlarged with the vitrector in dense mode tissue.  Next a perfluoron-air exchange was performed. The perfluoroctane liquid (PFO) was rinsed with Balanced Salt Solution and then aspirated with soft tip canula. Next, silicone oil 1000cs was injected.  The cannulas were removed and the sclerotomies were closed with 6-0 plain gut suture. The pressure was checked and verified to be appropriate.   Subconjuctival injections of dexamethasone and vancomycin were administered. The lid speculum was removed. The eye was cleaned with wet and dry gauze. A drop of atropine and maxitrol ointment was placed in the eye. An eye pad and sheridan shield were taped over the eye.   The patient tolerated well the procedure and was discharge to the post-operative unit in stable conditions with no complications.  The surgery was assisted by Dr. Osullivan. Due to the delicate and complex nature of this surgery, , was required. ,assisted with vitrectomy. I was present for the entire surgery.

## 2019-09-27 NOTE — ANESTHESIA POSTPROCEDURE EVALUATION
Anesthesia POST Procedure Evaluation    Patient: Mary Carlos   MRN:     [id number] Gender:   female   Age:    74 year old :      1945        Preoperative Diagnosis: Recent retinal detachment of left eye, total/subtotal [H33.052]   Procedure(s):  Left eye 25 Gauge Parsplana Vitrectomy, Silisone Oil Removal, Membrane Peel, Endolaser, Air/fluid Exchange, Infusion of 1000 Silicone Oil   Postop Comments: No value filed.       Anesthesia Type:  Not documented  No value filed.    Reportable Event: NO     PAIN: Uncomplicated   Sign Out status: Comfortable, Well controlled pain     PONV: No PONV   Sign Out status:  No Nausea or Vomiting     Neuro/Psych: Uneventful perioperative course   Sign Out Status: Preoperative baseline; Age appropriate mentation     Airway/Resp.: Uneventful perioperative course   Sign Out Status: Non labored breathing, age appropriate RR; Resp. Status within EXPECTED Parameters     CV: Uneventful perioperative course   Sign Out status: Appropriate BP and perfusion indices; Appropriate HR/Rhythm     Disposition:   Sign Out in:  PACU  Disposition:  Phase II; Home  Recovery Course: Uneventful  Follow-Up: Not required           Last Anesthesia Record Vitals:  CRNA VITALS  2019 0945 - 2019 1045      2019             Resp Rate (set):  10          Last PACU Vitals:  Vitals Value Taken Time   /85 2019 10:30 AM   Temp 36.4  C (97.6  F) 2019 10:30 AM   Pulse 81 2019 10:30 AM   Resp 20 2019 10:30 AM   SpO2 95 % 2019 10:30 AM   Temp src     NIBP     Pulse     SpO2     Resp     Temp     Ht Rate     Temp 2           Electronically Signed By: Ruiz Pugh MD, MD, 2019, 11:08 AM

## 2020-01-15 ENCOUNTER — OFFICE VISIT (OUTPATIENT)
Dept: OPHTHALMOLOGY | Facility: CLINIC | Age: 75
End: 2020-01-15
Attending: OPHTHALMOLOGY
Payer: MEDICARE

## 2020-01-15 DIAGNOSIS — H33.052 RECENT RETINAL DETACHMENT OF LEFT EYE, TOTAL/SUBTOTAL: Primary | ICD-10-CM

## 2020-01-15 DIAGNOSIS — H20.9 UVEITIS: ICD-10-CM

## 2020-01-15 PROCEDURE — G0463 HOSPITAL OUTPT CLINIC VISIT: HCPCS | Mod: ZF

## 2020-01-15 PROCEDURE — 92250 FUNDUS PHOTOGRAPHY W/I&R: CPT | Mod: 59 | Performed by: OPHTHALMOLOGY

## 2020-01-15 PROCEDURE — 92134 CPTRZ OPH DX IMG PST SGM RTA: CPT | Mod: ZF | Performed by: OPHTHALMOLOGY

## 2020-01-15 RX ORDER — PREDNISOLONE ACETATE 10 MG/ML
1-2 SUSPENSION/ DROPS OPHTHALMIC 3 TIMES DAILY
Qty: 1 BOTTLE | Refills: 4 | Status: SHIPPED | OUTPATIENT
Start: 2020-01-15 | End: 2020-06-15

## 2020-01-15 ASSESSMENT — TONOMETRY
OS_IOP_MMHG: 06
IOP_METHOD: TONOPEN
OD_IOP_MMHG: 14

## 2020-01-15 ASSESSMENT — CONF VISUAL FIELD
OS_SUPERIOR_NASAL_RESTRICTION: 1
OS_INFERIOR_NASAL_RESTRICTION: 1
OS_SUPERIOR_TEMPORAL_RESTRICTION: 3
OD_SUPERIOR_TEMPORAL_RESTRICTION: 3
OD_SUPERIOR_NASAL_RESTRICTION: 3
OS_INFERIOR_TEMPORAL_RESTRICTION: 3

## 2020-01-15 ASSESSMENT — EXTERNAL EXAM - RIGHT EYE: OD_EXAM: NORMAL

## 2020-01-15 ASSESSMENT — VISUAL ACUITY
OD_SC: 20/150
OS_SC: 5/200 E
OS_CC: 20/400
METHOD: SNELLEN - LINEAR
OD_PH_SC: 20/80

## 2020-01-15 ASSESSMENT — SLIT LAMP EXAM - LIDS
COMMENTS: PTOSIS
COMMENTS: PTOSIS

## 2020-01-15 ASSESSMENT — EXTERNAL EXAM - LEFT EYE: OS_EXAM: NORMAL

## 2020-01-15 NOTE — NURSING NOTE
Chief Complaints and History of Present Illnesses   Patient presents with     Follow Up     4 month follow up  s/p PPV/lensectomy/MP/EL/Posterior synechiolysis/Iris hooks/retinectomy/PFO/AFX/SO left eye 8.2.19     Chief Complaint(s) and History of Present Illness(es)     Follow Up     Comments: 4 month follow up  s/p PPV/lensectomy/MP/EL/Posterior synechiolysis/Iris hooks/retinectomy/PFO/AFX/SO left eye 8.2.19              Comments     Pt states vision is the same as last visit. No eye pain today.  No flashes or floaters.  Pt here to discuss surgery for her RE. Pt has not been taking any medications for the past 2 months.    COLT Parks  January 15, 2020 9:06 AM

## 2020-01-15 NOTE — PROGRESS NOTES
Postoperative month 4 status post 25 g Pars plana vitrectomy (PPV), SOR/SO 1000 placement; Membrane peel, retinectomy left eye 09/27/19 for recurrent Retinal detachment. Last seen at 1 day post-op visit.  Wants to discusss surgery for her right eye given advanced cataract  On exam few Anterior chamber cells  Dry eye syndrome      IOP 6  Retina attached  Doing well  Stopped all drops and medications two months ago  Inferior macula area of subretinal fluid localized and stable    OCT 1-   Right eye: good foveal contour  Left eye: central atrophic changes, trace IRF, trace ERM. Inferior OCT with inferior detachment - smaller compared to previous scan- localized --> observe     Optos photos 1-  Consistent with exam    Autofluorescence 1- : left eye peripheral hypoautofluorescence of the laser scars and hypoautofluorescence of the inf macula area of subretinal fluid    Plan:  Patient wishes to consider CE/IOL in the right eye.   Plan for cataract evaluation next follow up   Exam with 1+ pigmented cell. ?persistent anterior uveitis?. restarted Predforte  Three times a day both eyes   Follow up in one month with intraocular lens calcs; fluorescein angiography transits right eye; Optical Coherence Tomography and optos pic  Recommend warm compresses and artificial tears  Three times a day   Eyelid Javy Poe MD  Ophthalmology Resident, PGY-3  ~~~~~~~~~~~~~~~~~~~~~~~~~~~~~~~~~~   Complete documentation of historical and exam elements from today's encounter can be found in the full encounter summary report (not reduplicated in this progress note).  I personally obtained the chief complaint(s) and history of present illness.  I confirmed and edited as necessary the review of systems, past medical/surgical history, family history, social history, and examination findings as documented by others; and I examined the patient myself.  I personally reviewed the relevant tests, images, and  reports as documented above.  I personally reviewed the ophthalmic test(s) associated with this encounter, agree with the interpretation(s) as documented by the resident/fellow, and have edited the corresponding report(s) as necessary.   I formulated and edited as necessary the assessment and plan and discussed the findings and management plan with the patient and family    Carolee Garza MD   of Ophthalmology.  Retina Service   Department of Ophthalmology and Visual Neurosciences   [facility]  Phone: [phone]   Fax: [phone]

## 2020-02-17 DIAGNOSIS — H44.112 PANUVEITIS OF LEFT EYE: Primary | ICD-10-CM

## 2020-02-17 DIAGNOSIS — H26.9 NUCLEAR CATARACT, NONSENILE: ICD-10-CM

## 2020-02-19 ENCOUNTER — OFFICE VISIT (OUTPATIENT)
Dept: OPHTHALMOLOGY | Facility: CLINIC | Age: 75
End: 2020-02-19
Attending: OPHTHALMOLOGY
Payer: MEDICARE

## 2020-02-19 DIAGNOSIS — H52.213 IRREGULAR ASTIGMATISM OF BOTH EYES: Primary | ICD-10-CM

## 2020-02-19 DIAGNOSIS — H44.112 PANUVEITIS OF LEFT EYE: ICD-10-CM

## 2020-02-19 DIAGNOSIS — H26.9 NUCLEAR CATARACT, NONSENILE: ICD-10-CM

## 2020-02-19 PROCEDURE — 76519 ECHO EXAM OF EYE: CPT | Mod: ZF | Performed by: OPHTHALMOLOGY

## 2020-02-19 PROCEDURE — G0463 HOSPITAL OUTPT CLINIC VISIT: HCPCS | Mod: 25

## 2020-02-19 PROCEDURE — 92235 FLUORESCEIN ANGRPH MLTIFRAME: CPT | Mod: ZF | Performed by: OPHTHALMOLOGY

## 2020-02-19 PROCEDURE — 92134 CPTRZ OPH DX IMG PST SGM RTA: CPT | Mod: ZF | Performed by: OPHTHALMOLOGY

## 2020-02-19 PROCEDURE — 92250 FUNDUS PHOTOGRAPHY W/I&R: CPT | Mod: 59 | Performed by: OPHTHALMOLOGY

## 2020-02-19 PROCEDURE — 92025 CPTRIZED CORNEAL TOPOGRAPHY: CPT | Mod: ZF | Performed by: OPHTHALMOLOGY

## 2020-02-19 ASSESSMENT — VISUAL ACUITY
OD_SC: 20/200
OD_PH_SC: 20/100
METHOD: SNELLEN - LINEAR
OS_SC: CF @ 3'

## 2020-02-19 ASSESSMENT — TONOMETRY
IOP_METHOD: TONOPEN
OS_IOP_MMHG: 14
OD_IOP_MMHG: 15

## 2020-02-19 ASSESSMENT — SLIT LAMP EXAM - LIDS
COMMENTS: PTOSIS
COMMENTS: PTOSIS

## 2020-02-19 ASSESSMENT — CONF VISUAL FIELD
OS_SUPERIOR_NASAL_RESTRICTION: 1
OS_INFERIOR_NASAL_RESTRICTION: 2
OS_INFERIOR_TEMPORAL_RESTRICTION: 2
OS_SUPERIOR_TEMPORAL_RESTRICTION: 1

## 2020-02-19 ASSESSMENT — EXTERNAL EXAM - RIGHT EYE: OD_EXAM: NORMAL

## 2020-02-19 ASSESSMENT — EXTERNAL EXAM - LEFT EYE: OS_EXAM: NORMAL

## 2020-02-19 NOTE — NURSING NOTE
Chief Complaints and History of Present Illnesses   Patient presents with     Follow Up     Chief Complaint(s) and History of Present Illness(es)     Follow Up     Laterality: both eyes    Associated symptoms: photophobia and floaters.  Negative for eye pain and flashes    Treatments tried: eye drops and artificial tears    Response to treatment: no improvement    Pain scale: 0/10              Comments     Interested in CE/IOL RE  Pt reports no vision change things seem the same  Refresh tid BE  Prednisolone bid BE  Stephanie John COA 8:53 AM February 19, 2020

## 2020-02-19 NOTE — Clinical Note
Patient plan for Cataract extraction intraocular lens. Cornea astigm irregular. Do you think will benefit from toric?

## 2020-02-19 NOTE — PROGRESS NOTES
CC: Here for post RD follow up and to discuss CE/IOL in the right eye    HPI: Postoperative month 5 status post 25 g Pars plana vitrectomy (PPV), SOR/SO 1000 placement; Membrane peel, retinectomy left eye 09/27/19 for recurrent retinal detachment, doing well    OCT 02/19/20  Right eye: good foveal contour  Left eye: central atrophic changes, trace IRF, trace ERM. Inferior OCT with inferior detachment - smaller compared to previous scan- localized --> observe     Optos photos 02/19/20  Photos: Consistent with exam    FA 02/19/20: No vasculitis right eye    Autofluorescence 1- : left eye peripheral hypoautofluorescence of the laser scars and hypoautofluorescence of the inf macula area of subretinal fluid    A/P:  1. Visually significant cataract, right eye   Rare pigmented cell in the AC - on chronic Pred two times a day, continue   Patient interested in surgery   IOL calculations obtained   Patient wishes to have cataract surgery in the right eye   Ok to schedule    Dilates to: 8 mm  Trauma/Pseudoxfoliation: None  Fuchs dystrophy/guttae: None    Diabetes: No  Anticoagulation: None    Cyl: irregular on pentacam both eyes     We discussed the risks and benefits of cataract surgery in the right eye, and informed consent was obtained.  Proceed with CE/IOL RIGHT EYE    Surgical plan:  Topical  Malyugin ring  Capsular tension ring  Post-op acular      2. 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  I personally obtained the chief complaint(s) and history of present illness.  I confirmed and edited as necessary the review of systems, past medical/surgical history, family history, social history, and examination findings as documented by others; and I examined the patient myself.  I personally reviewed the relevant tests, images, and reports as documented above.  I personally reviewed the ophthalmic test(s) associated with this encounter, agree with the interpretation(s) as documented by the resident/fellow, and have edited the corresponding report(s) as necessary.   I formulated and edited as necessary the assessment and plan and discussed the findings and management plan with the patient and family    Carolee Garza MD   of Ophthalmology.  Retina Service   Department of Ophthalmology and Visual Neurosciences   [facility]  Phone: [phone]   Fax: [phone]

## 2020-04-24 NOTE — NURSING NOTE
Chief Complaints and History of Present Illnesses   Patient presents with     Post Op (Ophthalmology) Left Eye     POD#1 s/p 25 gauge pars plana vitrectomy, posterior synechiolysis, pars plana lensectomy, endolaser, PFO, air fluid exchange, Silicone Oil 1000 cs placement, periperal iridotomy left eye and vitreous biopsy     Chief Complaint(s) and History of Present Illness(es)     Post Op (Ophthalmology) Left Eye     Laterality: left eye    Associated symptoms: Negative for eye pain, floaters and flashes    Comments: POD#1 s/p 25 gauge pars plana vitrectomy, posterior synechiolysis, pars plana lensectomy, endolaser, PFO, air fluid exchange, Silicone Oil 1000 cs placement, periperal iridotomy left eye and vitreous biopsy              Comments     Patient notes that she didn't sleep well last night, the LE itching, denies pain, flashes or floaters.    Liana Peralta June 18, 2019 7:08 AM                   Noted, GI on board

## 2020-06-14 DIAGNOSIS — H33.052 RECENT RETINAL DETACHMENT OF LEFT EYE, TOTAL/SUBTOTAL: ICD-10-CM

## 2020-06-14 DIAGNOSIS — H20.9 UVEITIS: ICD-10-CM

## 2020-06-15 DIAGNOSIS — H26.9 NUCLEAR CATARACT, NONSENILE: Primary | ICD-10-CM

## 2020-06-15 RX ORDER — PREDNISOLONE ACETATE 10 MG/ML
1-2 SUSPENSION/ DROPS OPHTHALMIC 2 TIMES DAILY
Qty: 5 ML | Refills: 3 | Status: SHIPPED | OUTPATIENT
Start: 2020-06-15

## 2020-06-15 NOTE — TELEPHONE ENCOUNTER
Refill provided. There was no follow-up plan from the last retina clinic visit. Will route the note to retina clinic to coordinate follow-up.

## 2020-06-15 NOTE — TELEPHONE ENCOUNTER
PREDNISOLONE AC 1% OPHTH SUSP  5ML   Dx:  Recent retinal detachment of left eye, total/subtotal; Uveitis     Requested directions:  Place 1-2 drops into both eyes 3 times daily - Both Eyes   Current directions on the medication list: Place 1-2 drops into both eyes 3 times daily - Both Eyes     Last Written Prescription Date:  1/15/2020  Last Fill Quantity: 1,   # refills: 4    Last Office Visit:  2/19/2020  Future Office visit: None    Attending Provider:     Carolee Garza MD   Ophthalmology      Last Clinic Note: 2/19/2020    A/P:  1. Visually significant cataract, right eye              Rare pigmented cell in the AC - on chronic Pred two times a day, continue              Patient interested in surgery              IOL calculations obtained              Patient wishes to have cataract surgery in the right eye              Ok to schedule     Dilates to: 8 mm  Trauma/Pseudoxfoliation: None  Fuchs dystrophy/guttae: None     Diabetes: No  Anticoagulation: None     Cyl: irregular on pentacam both eyes      We discussed the risks and benefits of cataract surgery in the right eye, and informed consent was obtained.  Proceed with CE/IOL RIGHT EYE     Surgical plan:  Topical  Malyugin ring  Capsular tension ring  Post-op acular        2. 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    Beth Sen RN  Central Triage Red Flags/Med Refills

## 2020-07-01 ENCOUNTER — TELEPHONE (OUTPATIENT)
Dept: OPHTHALMOLOGY | Facility: CLINIC | Age: 75
End: 2020-07-01

## 2020-07-01 DIAGNOSIS — Z11.59 ENCOUNTER FOR SCREENING FOR OTHER VIRAL DISEASES: Primary | ICD-10-CM

## 2020-07-01 PROBLEM — H26.9 NUCLEAR CATARACT, NONSENILE: Status: ACTIVE | Noted: 2020-07-01

## 2020-07-01 NOTE — TELEPHONE ENCOUNTER
Patient is scheduled for surgery with Dr. Carolee Garza     Spoke with: Dariela     Date of Surgery: 07/14/20     Location: [facility]:  [address] [place] [address]     Informed patient they will need an adult : Yes     H&P will be completed at: [facility] [place]     Post Op scheduled on 07/15 and 07/22     Surgery packet was 07/01/20     Additional comments: Advised RN will call 1 - 2 business days prior with arrival time and instructions.

## 2020-07-08 ENCOUNTER — OFFICE VISIT (OUTPATIENT)
Dept: OPHTHALMOLOGY | Facility: CLINIC | Age: 75
End: 2020-07-08
Attending: OPHTHALMOLOGY
Payer: MEDICARE

## 2020-07-08 DIAGNOSIS — H26.9 NUCLEAR CATARACT, NONSENILE: Primary | ICD-10-CM

## 2020-07-08 DIAGNOSIS — Z48.810 AFTERCARE FOLLOWING SURGERY OF A SENSE ORGAN: ICD-10-CM

## 2020-07-08 PROCEDURE — G0463 HOSPITAL OUTPT CLINIC VISIT: HCPCS | Mod: ZF

## 2020-07-08 RX ORDER — MOXIFLOXACIN 5 MG/ML
1 SOLUTION/ DROPS OPHTHALMIC 4 TIMES DAILY
Qty: 1 BOTTLE | Refills: 2 | Status: SHIPPED | OUTPATIENT
Start: 2020-07-08

## 2020-07-08 RX ORDER — KETOROLAC TROMETHAMINE 5 MG/ML
1 SOLUTION OPHTHALMIC 4 TIMES DAILY
Qty: 1 BOTTLE | Refills: 2 | Status: SHIPPED | OUTPATIENT
Start: 2020-07-08

## 2020-07-08 RX ORDER — PREDNISOLONE ACETATE 10 MG/ML
1-2 SUSPENSION/ DROPS OPHTHALMIC 4 TIMES DAILY
Qty: 1 BOTTLE | Refills: 2 | Status: SHIPPED | OUTPATIENT
Start: 2020-07-08

## 2020-07-08 ASSESSMENT — SLIT LAMP EXAM - LIDS
COMMENTS: PTOSIS
COMMENTS: PTOSIS

## 2020-07-08 ASSESSMENT — VISUAL ACUITY
OS_SC: CF 1'
OD_PH_SC: 20/100-/+
OD_SC: 20/125
METHOD: SNELLEN - LINEAR

## 2020-07-08 ASSESSMENT — TONOMETRY
OD_IOP_MMHG: 25
IOP_METHOD: TONOPEN
OS_IOP_MMHG: 08
IOP_METHOD: TONOPEN
OS_IOP_MMHG: 08
OD_IOP_MMHG: 23

## 2020-07-08 ASSESSMENT — CONF VISUAL FIELD
OS_SUPERIOR_TEMPORAL_RESTRICTION: 3
OS_SUPERIOR_NASAL_RESTRICTION: 1
OD_SUPERIOR_NASAL_RESTRICTION: 3
OS_INFERIOR_TEMPORAL_RESTRICTION: 3
OS_INFERIOR_NASAL_RESTRICTION: 1

## 2020-07-08 ASSESSMENT — EXTERNAL EXAM - LEFT EYE: OS_EXAM: NORMAL

## 2020-07-08 ASSESSMENT — EXTERNAL EXAM - RIGHT EYE: OD_EXAM: NORMAL

## 2020-07-08 NOTE — PROGRESS NOTES
CC: Here for post RD follow up and to discuss CE/IOL in the right eye    HPI: Postoperative month 5 status post 25 g Pars plana vitrectomy (PPV), SOR/SO 1000 placement; Membrane peel, retinectomy left eye 09/27/19 for recurrent retinal detachment, doing well    OCT 02/19/20  Right eye: good foveal contour  Left eye: central atrophic changes, trace IRF, trace ERM. Inferior OCT with inferior detachment - smaller compared to previous scan- localized --> observe     Optos photos 02/19/20  Photos: Consistent with exam    FA 02/19/20: No vasculitis right eye    Autofluorescence 1- : left eye peripheral hypoautofluorescence of the laser scars and hypoautofluorescence of the inf macula area of subretinal fluid    A/P:  1. Visually significant cataract, right eye   Advanced cataract    Rare pigmented cell in the AC - on chronic Pred two times a day, continue   Patient interested in surgery   IOL calculations obtained   Patient wishes to have cataract surgery in the right eye   Ok to schedule    Dilates to: 8 mm  Trauma/Pseudoxfoliation: None  Fuchs dystrophy/guttae: None      We discussed the risks and benefits of cataract surgery in the right eye, and informed consent was obtained.  Proceed with CE/IOL RIGHT EYE      Cyl: irregular on pentacam both eyes   Surgical plan:  Peribulbar   tripan blue- yes  Malyugin ring and Capsular tension ring. - probably not needed, available just in case.   Post-op acular  Diabetes: No  Anticoagulation: None  Start acular three times a day 2-3 days before surgery    2. 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  I personally obtained the chief complaint(s) and history of present illness.  I confirmed and edited as necessary the review of systems, past medical/surgical history, family history, social history, and examination findings as documented by others; and I examined the patient myself.  I personally reviewed the relevant tests, images, and reports as documented above.  I personally reviewed the ophthalmic test(s) associated with this encounter, agree with the interpretation(s) as documented by the resident/fellow, and have edited the corresponding report(s) as necessary.   I formulated and edited as necessary the assessment and plan and discussed the findings and management plan with the patient and family    Carolee Garza MD   of Ophthalmology.  Retina Service   Department of Ophthalmology and Visual Neurosciences   [facility]  Phone: [phone]   Fax: [phone]

## 2020-07-11 DIAGNOSIS — Z11.59 ENCOUNTER FOR SCREENING FOR OTHER VIRAL DISEASES: ICD-10-CM

## 2020-07-11 PROCEDURE — U0003 INFECTIOUS AGENT DETECTION BY NUCLEIC ACID (DNA OR RNA); SEVERE ACUTE RESPIRATORY SYNDROME CORONAVIRUS 2 (SARS-COV-2) (CORONAVIRUS DISEASE [COVID-19]), AMPLIFIED PROBE TECHNIQUE, MAKING USE OF HIGH THROUGHPUT TECHNOLOGIES AS DESCRIBED BY CMS-2020-01-R: HCPCS | Performed by: OPHTHALMOLOGY

## 2020-07-12 LAB
SARS-COV-2 RNA SPEC QL NAA+PROBE: NOT DETECTED
SPECIMEN SOURCE: NORMAL

## 2020-07-13 ENCOUNTER — ANESTHESIA EVENT (OUTPATIENT)
Dept: SURGERY | Facility: AMBULATORY SURGERY CENTER | Age: 75
End: 2020-07-13

## 2020-07-14 ENCOUNTER — HOSPITAL ENCOUNTER (OUTPATIENT)
Facility: AMBULATORY SURGERY CENTER | Age: 75
End: 2020-07-14
Attending: OPHTHALMOLOGY
Payer: MEDICARE

## 2020-07-14 ENCOUNTER — ANESTHESIA (OUTPATIENT)
Dept: SURGERY | Facility: AMBULATORY SURGERY CENTER | Age: 75
End: 2020-07-14

## 2020-07-14 VITALS
WEIGHT: 172 LBS | TEMPERATURE: 97.1 F | DIASTOLIC BLOOD PRESSURE: 63 MMHG | SYSTOLIC BLOOD PRESSURE: 139 MMHG | HEART RATE: 65 BPM | BODY MASS INDEX: 30.48 KG/M2 | HEIGHT: 63 IN | OXYGEN SATURATION: 96 % | RESPIRATION RATE: 16 BRPM

## 2020-07-14 DIAGNOSIS — H26.9 NUCLEAR CATARACT, NONSENILE: ICD-10-CM

## 2020-07-14 DIAGNOSIS — Z48.810 AFTERCARE FOLLOWING SURGERY OF A SENSE ORGAN: Primary | ICD-10-CM

## 2020-07-14 DEVICE — EYE IMP IOL ALCON PCL SN60WF ACRYSOF IQ 16.5: Type: IMPLANTABLE DEVICE | Site: EYE | Status: FUNCTIONAL

## 2020-07-14 RX ORDER — SODIUM CHLORIDE, SODIUM LACTATE, POTASSIUM CHLORIDE, CALCIUM CHLORIDE 600; 310; 30; 20 MG/100ML; MG/100ML; MG/100ML; MG/100ML
INJECTION, SOLUTION INTRAVENOUS CONTINUOUS
Status: DISCONTINUED | OUTPATIENT
Start: 2020-07-14 | End: 2020-07-14

## 2020-07-14 RX ORDER — BALANCED SALT SOLUTION 6.4; .75; .48; .3; 3.9; 1.7 MG/ML; MG/ML; MG/ML; MG/ML; MG/ML; MG/ML
SOLUTION OPHTHALMIC PRN
Status: DISCONTINUED | OUTPATIENT
Start: 2020-07-14 | End: 2020-07-14 | Stop reason: HOSPADM

## 2020-07-14 RX ORDER — DEXAMETHASONE SODIUM PHOSPHATE 4 MG/ML
INJECTION, SOLUTION INTRA-ARTICULAR; INTRALESIONAL; INTRAMUSCULAR; INTRAVENOUS; SOFT TISSUE PRN
Status: DISCONTINUED | OUTPATIENT
Start: 2020-07-14 | End: 2020-07-14 | Stop reason: HOSPADM

## 2020-07-14 RX ORDER — LIDOCAINE HYDROCHLORIDE 20 MG/ML
INJECTION, SOLUTION INFILTRATION; PERINEURAL PRN
Status: DISCONTINUED | OUTPATIENT
Start: 2020-07-14 | End: 2020-07-14

## 2020-07-14 RX ORDER — PREDNISOLONE ACETATE 10 MG/ML
1 SUSPENSION/ DROPS OPHTHALMIC 4 TIMES DAILY
Start: 2020-07-14

## 2020-07-14 RX ORDER — PROPOFOL 10 MG/ML
INJECTION, EMULSION INTRAVENOUS PRN
Status: DISCONTINUED | OUTPATIENT
Start: 2020-07-14 | End: 2020-07-14

## 2020-07-14 RX ORDER — FENTANYL CITRATE 50 UG/ML
INJECTION, SOLUTION INTRAMUSCULAR; INTRAVENOUS PRN
Status: DISCONTINUED | OUTPATIENT
Start: 2020-07-14 | End: 2020-07-14

## 2020-07-14 RX ORDER — DICLOFENAC SODIUM 1 MG/ML
1 SOLUTION/ DROPS OPHTHALMIC
Status: COMPLETED | OUTPATIENT
Start: 2020-07-14 | End: 2020-07-14

## 2020-07-14 RX ORDER — KETOROLAC TROMETHAMINE 4 MG/ML
1 SOLUTION/ DROPS OPHTHALMIC 4 TIMES DAILY
Start: 2020-07-14

## 2020-07-14 RX ORDER — ONDANSETRON 2 MG/ML
4 INJECTION INTRAMUSCULAR; INTRAVENOUS EVERY 30 MIN PRN
Status: DISCONTINUED | OUTPATIENT
Start: 2020-07-14 | End: 2020-07-15 | Stop reason: HOSPADM

## 2020-07-14 RX ORDER — MOXIFLOXACIN 5 MG/ML
1 SOLUTION/ DROPS OPHTHALMIC 3 TIMES DAILY
Start: 2020-07-14 | End: 2020-10-29

## 2020-07-14 RX ORDER — ACETAMINOPHEN 325 MG/1
975 TABLET ORAL ONCE
Status: COMPLETED | OUTPATIENT
Start: 2020-07-14 | End: 2020-07-14

## 2020-07-14 RX ORDER — CYCLOPENTOLAT/TROPIC/PHENYLEPH 1%-1%-2.5%
1 DROPS (EA) OPHTHALMIC (EYE)
Status: COMPLETED | OUTPATIENT
Start: 2020-07-14 | End: 2020-07-14

## 2020-07-14 RX ORDER — NALOXONE HYDROCHLORIDE 0.4 MG/ML
.1-.4 INJECTION, SOLUTION INTRAMUSCULAR; INTRAVENOUS; SUBCUTANEOUS
Status: DISCONTINUED | OUTPATIENT
Start: 2020-07-14 | End: 2020-07-15 | Stop reason: HOSPADM

## 2020-07-14 RX ORDER — ONDANSETRON 4 MG/1
4 TABLET, ORALLY DISINTEGRATING ORAL EVERY 30 MIN PRN
Status: DISCONTINUED | OUTPATIENT
Start: 2020-07-14 | End: 2020-07-15 | Stop reason: HOSPADM

## 2020-07-14 RX ORDER — FENTANYL CITRATE 50 UG/ML
25-50 INJECTION, SOLUTION INTRAMUSCULAR; INTRAVENOUS
Status: DISCONTINUED | OUTPATIENT
Start: 2020-07-14 | End: 2020-07-14 | Stop reason: HOSPADM

## 2020-07-14 RX ORDER — TETRACAINE HYDROCHLORIDE 5 MG/ML
SOLUTION OPHTHALMIC PRN
Status: DISCONTINUED | OUTPATIENT
Start: 2020-07-14 | End: 2020-07-14 | Stop reason: HOSPADM

## 2020-07-14 RX ORDER — MEPERIDINE HYDROCHLORIDE 25 MG/ML
12.5 INJECTION INTRAMUSCULAR; INTRAVENOUS; SUBCUTANEOUS
Status: DISCONTINUED | OUTPATIENT
Start: 2020-07-14 | End: 2020-07-15 | Stop reason: HOSPADM

## 2020-07-14 RX ORDER — MOXIFLOXACIN 5 MG/ML
1 SOLUTION/ DROPS OPHTHALMIC
Status: COMPLETED | OUTPATIENT
Start: 2020-07-14 | End: 2020-07-14

## 2020-07-14 RX ORDER — SODIUM CHLORIDE, SODIUM LACTATE, POTASSIUM CHLORIDE, CALCIUM CHLORIDE 600; 310; 30; 20 MG/100ML; MG/100ML; MG/100ML; MG/100ML
500 INJECTION, SOLUTION INTRAVENOUS CONTINUOUS
Status: DISCONTINUED | OUTPATIENT
Start: 2020-07-14 | End: 2020-07-14

## 2020-07-14 RX ORDER — LIDOCAINE 40 MG/G
CREAM TOPICAL
Status: DISCONTINUED | OUTPATIENT
Start: 2020-07-14 | End: 2020-07-14 | Stop reason: HOSPADM

## 2020-07-14 RX ADMIN — LIDOCAINE HYDROCHLORIDE 40 MG: 20 INJECTION, SOLUTION INFILTRATION; PERINEURAL at 08:07

## 2020-07-14 RX ADMIN — DICLOFENAC SODIUM 1 DROP: 1 SOLUTION/ DROPS OPHTHALMIC at 07:28

## 2020-07-14 RX ADMIN — PROPOFOL 50 MG: 10 INJECTION, EMULSION INTRAVENOUS at 08:08

## 2020-07-14 RX ADMIN — MOXIFLOXACIN 1 DROP: 5 SOLUTION/ DROPS OPHTHALMIC at 07:17

## 2020-07-14 RX ADMIN — Medication 1 DROP: at 07:17

## 2020-07-14 RX ADMIN — ACETAMINOPHEN 975 MG: 325 TABLET ORAL at 07:15

## 2020-07-14 RX ADMIN — MOXIFLOXACIN 1 DROP: 5 SOLUTION/ DROPS OPHTHALMIC at 07:28

## 2020-07-14 RX ADMIN — Medication 1 DROP: at 07:28

## 2020-07-14 RX ADMIN — DICLOFENAC SODIUM 1 DROP: 1 SOLUTION/ DROPS OPHTHALMIC at 07:17

## 2020-07-14 RX ADMIN — Medication 1 DROP: at 07:22

## 2020-07-14 RX ADMIN — MOXIFLOXACIN 1 DROP: 5 SOLUTION/ DROPS OPHTHALMIC at 07:22

## 2020-07-14 RX ADMIN — DICLOFENAC SODIUM 1 DROP: 1 SOLUTION/ DROPS OPHTHALMIC at 07:22

## 2020-07-14 RX ADMIN — SODIUM CHLORIDE, SODIUM LACTATE, POTASSIUM CHLORIDE, CALCIUM CHLORIDE 500 ML: 600; 310; 30; 20 INJECTION, SOLUTION INTRAVENOUS at 07:31

## 2020-07-14 RX ADMIN — FENTANYL CITRATE 50 MCG: 50 INJECTION, SOLUTION INTRAMUSCULAR; INTRAVENOUS at 08:05

## 2020-07-14 ASSESSMENT — MIFFLIN-ST. JEOR: SCORE: 1239.32

## 2020-07-14 NOTE — ANESTHESIA POSTPROCEDURE EVALUATION
Anesthesia POST Procedure Evaluation    Patient: Mary Carlos   MRN:     [id number] Gender:   female   Age:    75 year old :      1945        Preoperative Diagnosis: Nuclear cataract, nonsenile [H26.9]   Procedure(s):  RIGHT EYE COMPLEX PHACOEMULSIFICATION, CATARACT, WITH INTRAOCULAR LENS IMPLANT   Postop Comments: No value filed.     Anesthesia Type: MAC       Disposition: Outpatient   Postop Pain Control: Uneventful            Sign Out: Well controlled pain   PONV: No   Neuro/Psych: Uneventful            Sign Out: Acceptable/Baseline neuro status   Airway/Respiratory: Uneventful            Sign Out: Acceptable/Baseline resp. status   CV/Hemodynamics: Uneventful            Sign Out: Acceptable CV status   Other NRE: NONE   DID A NON-ROUTINE EVENT OCCUR? No         Last Anesthesia Record Vitals:  CRNA VITALS  2020 0824 - 2020 0924      2020             Resp Rate (set):  10          Last PACU Vitals:  Vitals Value Taken Time   /68 2020  8:59 AM   Temp 35.9  C (96.6  F) 2020  8:59 AM   Pulse     Resp 16 2020  8:59 AM   SpO2 97 % 2020  8:59 AM   Temp src Available 2020  8:45 AM   NIBP 136/66 2020  8:52 AM   Pulse 55 2020  8:54 AM   SpO2 100 % 2020  8:54 AM   Resp     Temp     Ht Rate 54 2020  8:54 AM   Temp 2           Electronically Signed By: Bhavani Reardon MD, 2020, 12:56 PM

## 2020-07-14 NOTE — ANESTHESIA CARE TRANSFER NOTE
Patient: Mary Carlos    Procedure(s):  RIGHT EYE COMPLEX PHACOEMULSIFICATION, CATARACT, WITH INTRAOCULAR LENS IMPLANT    Diagnosis: Nuclear cataract, nonsenile [H26.9]  Diagnosis Additional Information: No value filed.    Anesthesia Type:   MAC     Note:  Airway :Room Air  Patient transferred to:Phase II  Comments: Kemi Report: Identifed the Patient, Identified the Reponsible Provider, Reviewed the pertinent medical history, Discussed the surgical course, Reviewed Intra-OP anesthesia mangement and issues during anesthesia, Set expectations for post-procedure period and Allowed opportunity for questions and acknowledgement of understanding      Vitals: (Last set prior to Anesthesia Care Transfer)    CRNA VITALS  7/14/2020 0824 - 7/14/2020 0911      7/14/2020             Resp Rate (set):  10                Electronically Signed By: ROLANDO Arvizu CRNA  July 14, 2020  9:11 AM

## 2020-07-14 NOTE — DISCHARGE INSTRUCTIONS
[facility]  Home Care Following Anesthesia  For 24 hours after surgery:  1. Get plenty of rest.  A responsible adult must stay with you for at least 24 hours after you leave the surgery center.  2. Do not drive or use heavy equipment.  If you have weakness or tingling, don't drive or use heavy equipment until this feeling goes away.   3. Do not drink alcohol.   4. Avoid strenuous or risky activities.  Ask for help when climbing stairs.  5. You may feel lightheaded.  IF so, sit for a few minutes before standing.  Have someone help you get up.   6. If you have nausea (feel sick to your stomach): Drink only clear liquids such as apple juice, ginger ale, broth or 7-Up.  Rest may also help.  Be sure to drink enough fluids.  Move to a regular diet as you feel able.   7. You may have a slight fever.  Call the doctor if your fever is over 100 F (37.7 C) (taken under the tongue) or lasts longer than 24 hours.  8. You may have a dry mouth, a sore throat, muscle aches or trouble sleeping. These should go away after 24 hours.  9. Do not make important or legal decisions.             Tips for taking pain medications  To get the best pain relief possible, remember these points:    Take pain medications as directed, before pain becomes severe.    Pain medication can upset your stomach: taking it with food may help.    Constipation is a common side effect of pain medication. Drink plenty of  fluids.    Eat foods high in fiber. Take a stool softener if recommended by your doctor or pharmacist.    Do not drink alcohol, drive or operate machinery while taking pain medications.    Ask about other ways to control pain, such as with heat, ice or relaxation.    Tylenol/Acetaminophen Consumption  To help encourage the safe use of acetaminophen, the makers of TYLENOL  have lowered the maximum daily dose for single-ingredient Extra Strength TYLENOL  (acetaminophen) products sold in the [place] from 8 pills  per day (4,000 mg) to 6 pills per day (3,000 mg). The dosing interval has also changed from 2 pills every 4-6 hours to 2 pills every 6 hours.    If you feel your pain relief is insufficient, you may take Tylenol/Acetaminophen in addition to your narcotic pain medication.     Be careful not to exceed 3,000 mg of Tylenol/Acetaminophen in a 24 hour period from all sources.    If you are taking extra strength Tylenol/acetaminophen (500 mg), the maximum dose is 6 tablets in 24 hours.    If you are taking regular strength acetaminophen (325 mg), the maximum dose is 9 tablets in 24 hours.    Tylenol 975mg given at 7:15am. Next dose available after 1:15pm. Then follow package instructions.     Call a doctor for any of the followin. Signs of infection (fever, growing tenderness at the surgery site, a large amount of drainage or bleeding, severe pain, foul-smelling drainage, redness, swelling).  2. It has been over 8 to 10 hours since surgery and you are still not able to urinate (pass water).  3. Headache for over 24 hours.  4. Numbness, tingling or weakness the day after surgery (if you had spinal anesthesia).  5. Signs of Covid-19 infection (temperature over 100 degrees, shortness of breath, cough, loss of taste/smell, generalized body aches, persistent headache, chills, sore throat, nausea/vomiting/diarrhea)  Your doctor is:       Dr. Carolee Garza, Ophthalmology: [phone]               Or dial [phone] and ask for the resident on call for:  Ophthalmology  For emergency care, call the:  [facility]:  [phone] (TTY for hearing impaired: [phone])

## 2020-07-14 NOTE — OP NOTE
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   POSTOPERATIVE DIAGNOSIS: same  NAME OF THE PROCEDURE: Phacoemulsification with intraocular lens implantation  ANESTHESIA: Monitored anesthesia care and peribulbar block   COMPLICATIONS: none  INDICATIONS: Mary Carlos is a 75 year old with diagnosis of visually significant cataract, here for cataract surgery    DESCRIPTION OF THE PROCEDURE:  The patient was taken to the operative room where intravenous sedation was administered and a peribulbar block consisting of a 1:1 mixture of 2%lidocaine and 0.75% marcaine with epinephrine and wydase, was administered to the operative eye with adequate anesthesia and akinesia.    The operative eye was prepped and draped in the usual sterile surgical fashion for ophthalmic surgery, including the installation of one drop of 5% Povidone Iodine.  A sterile drape was placed over the face and body and a lid speculum was inserted.      With the use of a Supersharp blade and 0.12 forceps, a paracentesis was created at the 2 o'clock position, and tripan blue was used to stain the capsule. Next, the anterior chamber was irrigated with Balanced Salt Solution. Next, viscoelastic was injected into the anterior chamber using a canula.  A 2.5 mm keratome was then used to construct a clear corneal incision at the 10 o'clock position.  Using Utrata forceps and cystotome needle, a continuous curvilinear capsulorrhexis was created and hydrodissection was undertaken with the use of BSS.  The nucleus was found to be freely mobile and then removed by phacoemulsification using a divide and conquer technique.  The remaining elements of cortex were then removed with irrigation/aspiration.  An IOL,was injected into the capsular bag and was rotated into a good position with a Sinskey hook. The remaining elements of viscoelastic were then  removed with irrigation/aspiration. The wounds were hydrodissect and were watertight.  one 10- nylon suture was placed  at the wound incision and one at the paracenthesis site.  The lid speculum was removed.  Subconjunctival injection of Dexamethasone and Vanco were administered. The eye was cleaned with wet and dry gauze. Maxitrol ointment was placed on the eye.  A patch and Thornton shield were placed over the eye.  The patient was discharge in stable condition having tolerated the procedure well      Implant Name Type Inv. Item Serial No.  Lot No. LRB No. Used Action   EYE IMP IOL KRYSTAL PCL [id number] ACRYSOF IQ 16.5 Lens/Eye Implant EYE IMP IOL KRYSTAL PCL [id number] ACRYSOF IQ 16.5 [id number] [employer]  Right 1 Implanted     The surgery was assisted by Dr. Jean Nielsen. Due to the delicate and complex nature of this surgery,  Dr. Jean Nielsen was required. He assisted with vitrectomy. I was present for the entire surgery.

## 2020-07-15 ENCOUNTER — OFFICE VISIT (OUTPATIENT)
Dept: OPHTHALMOLOGY | Facility: CLINIC | Age: 75
End: 2020-07-15
Attending: OPHTHALMOLOGY
Payer: MEDICARE

## 2020-07-15 DIAGNOSIS — Z48.810 AFTERCARE FOLLOWING SURGERY OF A SENSORY ORGAN: Primary | ICD-10-CM

## 2020-07-15 DIAGNOSIS — Z96.1 PSEUDOPHAKIA OF RIGHT EYE: ICD-10-CM

## 2020-07-15 PROCEDURE — G0463 HOSPITAL OUTPT CLINIC VISIT: HCPCS | Mod: ZF

## 2020-07-15 ASSESSMENT — EXTERNAL EXAM - LEFT EYE: OS_EXAM: NORMAL

## 2020-07-15 ASSESSMENT — VISUAL ACUITY
OS_SC: CF @ 3'
METHOD: SNELLEN - LINEAR
OD_PH_SC: 20/60
OD_SC: 20/150

## 2020-07-15 ASSESSMENT — TONOMETRY
OS_IOP_MMHG: 06
OD_IOP_MMHG: 02
IOP_METHOD: ICARE

## 2020-07-15 ASSESSMENT — SLIT LAMP EXAM - LIDS
COMMENTS: PTOSIS
COMMENTS: PROTECTIVE PTOSIS

## 2020-07-15 ASSESSMENT — EXTERNAL EXAM - RIGHT EYE: OD_EXAM: NORMAL

## 2020-07-15 NOTE — PATIENT INSTRUCTIONS
Medications to operative eye  Predforte  (pink top) 6x/day in the RIGHT eye (shake the bottle before)  Ofloxacin (tan top) four times a day in the RIGHT eye  Maxitrol ointment at bedtime in the RIGHT eye  Put the eyedrops 5 minutes a part  Eye shield or glasses at all times x 3 weeks  Sleep with the shield  No heavy lifting

## 2020-07-15 NOTE — NURSING NOTE
Chief Complaints and History of Present Illnesses   Patient presents with     Post Op (Ophthalmology) Right Eye     Chief Complaint(s) and History of Present Illness(es)     Post Op (Ophthalmology) Right Eye     Laterality: right eye    Onset: 1 day ago              Comments     1 day s/p CE/IOL RE-Pt. States that she is doing well. No pain BE. Some itching RE. Was able to sleep well.  Leilani Oscar COT 10:23 AM July 15, 2020

## 2020-07-15 NOTE — PROGRESS NOTES
Postoperative day 1 status post RIGHT EYE COMPLEX PHACOEMULSIFICATION, CATARACT, WITH INTRAOCULAR LENS IMPLANT  7-14-20    Slept well  Retina attached  Doing well    Plan:  No heavy lifting   Thornton shield at all times  Retina detachment and endophthalmitis precautions were discussed with the patient and was asked to return if any of the those occur    Medications to operative eye    Predforte  (pink top) 6x/day RIGHT eye   (shake the bottle before)  Ofloxacin (tan top) four times a day    Put the eyedrops 5 minutes a part  Eye shield or glasses at all times x 3 weeks  Sleep with the shield  No heavy lifting   intraocular pressure low, no obvious shanita  Retina detachment and endophthalmitis precautions were discussed with the patient (increased blurry vision, drainage, new flashes, floaters or a curtain in the visual field) and was asked to return if any of the those occur      Follow up tomorrow for intraocular pressure check    Jean Hernandez MD  Ophthalmology PGY-3  [facility]   ~~~~~~~~~~~~~~~~~~~~~~~~~~~~~~~~~~   Complete documentation of historical and exam elements from today's encounter can be found in the full encounter summary report (not reduplicated in this progress note).  I personally obtained the chief complaint(s) and history of present illness.  I confirmed and edited as necessary the review of systems, past medical/surgical history, family history, social history, and examination findings as documented by others; and I examined the patient myself.  I personally reviewed the relevant tests, images, and reports as documented above.  I formulated and edited as necessary the assessment and plan and discussed the findings and management plan with the patient and family    Carolee Garza MD  .  Retina Service   Department of Ophthalmology and Visual Neurosciences   [facility]  Phone: [phone]   Fax: [phone]

## 2020-07-16 ENCOUNTER — OFFICE VISIT (OUTPATIENT)
Dept: OPHTHALMOLOGY | Facility: CLINIC | Age: 75
End: 2020-07-16
Attending: OPHTHALMOLOGY
Payer: MEDICARE

## 2020-07-16 DIAGNOSIS — Z48.810 AFTERCARE FOLLOWING SURGERY OF A SENSORY ORGAN: Primary | ICD-10-CM

## 2020-07-16 PROCEDURE — G0463 HOSPITAL OUTPT CLINIC VISIT: HCPCS | Mod: ZF

## 2020-07-16 ASSESSMENT — SLIT LAMP EXAM - LIDS
COMMENTS: PROTECTIVE PTOSIS
COMMENTS: PTOSIS

## 2020-07-16 ASSESSMENT — EXTERNAL EXAM - LEFT EYE: OS_EXAM: NORMAL

## 2020-07-16 ASSESSMENT — EXTERNAL EXAM - RIGHT EYE: OD_EXAM: NORMAL

## 2020-07-16 ASSESSMENT — TONOMETRY
OS_IOP_MMHG: 11
IOP_METHOD: TONOPEN
OD_IOP_MMHG: 04

## 2020-07-16 ASSESSMENT — VISUAL ACUITY
OS_SC: CF @ 3'
OD_SC: 20/60
METHOD: SNELLEN - LINEAR
OD_SC+: -2

## 2020-07-16 NOTE — NURSING NOTE
Chief Complaints and History of Present Illnesses   Patient presents with     Post Op (Ophthalmology) Right Eye     Chief Complaint(s) and History of Present Illness(es)     Post Op (Ophthalmology) Right Eye     Laterality: right eye    Associated symptoms: Negative for eye pain    Pain scale: 0/10              Comments     Postoperative day 2 status post RIGHT EYE COMPLEX PHACOEMULSIFICATION, CATARACT, WITH INTRAOCULAR LENS IMPLANT  7-14-20  Pt reports doing good  Oint at bedtime RE  Prednisolone QID RE  moxifloxacin q2h RE  Stephanie Lopez COA 11:15 AM July 16, 2020

## 2020-07-16 NOTE — PROGRESS NOTES
Postoperative day 2 status post RIGHT EYE COMPLEX PHACOEMULSIFICATION, CATARACT, WITH INTRAOCULAR LENS IMPLANT  7-14-20    Sutures in place  Retina attached  Doing well  Shanita neg    Plan:  No heavy lifting   Thornton shield at all times  Retina detachment and endophthalmitis precautions were discussed with the patient and was asked to return if any of the those occur    Medications to operative eye    Predforte  (pink top) 6x/day RIGHT eye   (shake the bottle before)  Ofloxacin (tan top) four times a day    Put the eyedrops 5 minutes a part  Eye shield or glasses at all times x 3 weeks  Sleep with the shield  No heavy lifting   intraocular pressure low, no obvious shanita  Retina detachment and endophthalmitis precautions were discussed with the patient (increased blurry vision, drainage, new flashes, floaters or a curtain in the visual field) and was asked to return if any of the those occur      Follow up tomorrow for intraocular pressure check      ~~~~~~~~~~~~~~~~~~~~~~~~~~~~~~~~~~   Complete documentation of historical and exam elements from today's encounter can be found in the full encounter summary report (not reduplicated in this progress note).  I personally obtained the chief complaint(s) and history of present illness.  I confirmed and edited as necessary the review of systems, past medical/surgical history, family history, social history, and examination findings as documented by others; and I examined the patient myself.  I personally reviewed the relevant tests, images, and reports as documented above.  I formulated and edited as necessary the assessment and plan and discussed the findings and management plan with the patient and family    Carolee Garza MD  .  Retina Service   Department of Ophthalmology and Visual Neurosciences   [facility]  Phone: [phone]   Fax: [phone]

## 2020-07-22 ENCOUNTER — OFFICE VISIT (OUTPATIENT)
Dept: OPHTHALMOLOGY | Facility: CLINIC | Age: 75
End: 2020-07-22
Attending: OPHTHALMOLOGY
Payer: MEDICARE

## 2020-07-22 DIAGNOSIS — Z48.810 AFTERCARE FOLLOWING SURGERY OF A SENSORY ORGAN: Primary | ICD-10-CM

## 2020-07-22 PROCEDURE — G0463 HOSPITAL OUTPT CLINIC VISIT: HCPCS | Mod: ZF

## 2020-07-22 ASSESSMENT — SLIT LAMP EXAM - LIDS: COMMENTS: DERMATOCHALASIS, BLEPHARITIS

## 2020-07-22 ASSESSMENT — VISUAL ACUITY
METHOD: SNELLEN - LINEAR
OS_SC: 1'/200E
OD_SC: 20/40

## 2020-07-22 ASSESSMENT — TONOMETRY
OD_IOP_MMHG: 11
OS_IOP_MMHG: 04
IOP_METHOD: ICARE

## 2020-07-22 ASSESSMENT — EXTERNAL EXAM - RIGHT EYE: OD_EXAM: NORMAL

## 2020-07-22 ASSESSMENT — EXTERNAL EXAM - LEFT EYE: OS_EXAM: NORMAL

## 2020-07-22 NOTE — NURSING NOTE
Chief Complaints and History of Present Illnesses   Patient presents with     Post Op (Ophthalmology) Right Eye     Chief Complaint(s) and History of Present Illness(es)     Post Op (Ophthalmology) Right Eye     Laterality: right eye    Onset: 1 week ago              Comments     status post RIGHT EYE COMPLEX PHACOEMULSIFICATION, CATARACT, WITH INTRAOCULAR LENS IMPLANT  7-14-20-Pt. States that she is doing well. VA is much improved RE. Occasional mild pain RE.  Leilani Oscar COT 8:41 AM July 22, 2020

## 2020-07-22 NOTE — PATIENT INSTRUCTIONS
Taper Predforte  (pink top) 3/2/1 weekly, then stop (shake the bottle before)  Stop Ofloxacin (tan top) four times a day  X 1 week and stop  Stop Maxitrol ointment at bedtime   Put the eyedrops 5 minutes a part  Eye shield or glasses at all times x 3 weeks  Sleep with the shield  No heavy lifting   intraocular pressure normalized; negative shanita - possibly cut sutures at Postoperative month#1 appointment  Retina detachment and endophthalmitis precautions were discussed with the patient (increased blurry vision, drainage, new flashes, floaters or a curtain in the visual field) and was asked to return if any of the those occur    Follow up in 3 weeks with prescription, Optical Coherence Tomography and dilation  Possible K suture removal

## 2020-07-22 NOTE — PROGRESS NOTES
Postoperative day 8 status post RIGHT EYE COMPLEX PHACOEMULSIFICATION, CATARACT, WITH INTRAOCULAR LENS IMPLANT  7-14-20    Sutures in place  Retina attached  Doing well  Shanita neg    Plan:  Taper Predforte  (pink top) 3/2/1 weekly, then stop (shake the bottle before)  Stop Ofloxacin (tan top) four times a day  X 1 week and stop  Stop Maxitrol ointment at bedtime   Put the eyedrops 5 minutes a part  Eye shield or glasses at all times x 3 weeks  Sleep with the shield  No heavy lifting   intraocular pressure normalized; negative shanita - possibly cut sutures at Postoperative month#1 appointment  Retina detachment and endophthalmitis precautions were discussed with the patient (increased blurry vision, drainage, new flashes, floaters or a curtain in the visual field) and was asked to return if any of the those occur    Follow up in 3 weeks with prescription, Optical Coherence Tomography and dilation  Possible K suture removal     Jean Hernandez MD  Ophthalmology - PGY3      ~~~~~~~~~~~~~~~~~~~~~~~~~~~~~~~~~~   Complete documentation of historical and exam elements from today's encounter can be found in the full encounter summary report (not reduplicated in this progress note).  I personally obtained the chief complaint(s) and history of present illness.  I confirmed and edited as necessary the review of systems, past medical/surgical history, family history, social history, and examination findings as documented by others; and I examined the patient myself.  I personally reviewed the relevant tests, images, and reports as documented above.  I formulated and edited as necessary the assessment and plan and discussed the findings and management plan with the patient and family    Carolee Garza MD  .  Retina Service   Department of Ophthalmology and Visual Neurosciences   [facility]  Phone: [phone]   Fax: [phone]

## 2020-08-13 ENCOUNTER — OFFICE VISIT (OUTPATIENT)
Dept: OPHTHALMOLOGY | Facility: CLINIC | Age: 75
End: 2020-08-13
Attending: OPHTHALMOLOGY
Payer: MEDICARE

## 2020-08-13 DIAGNOSIS — H33.052 OLD TOTAL RETINAL DETACHMENT OF LEFT EYE: Primary | ICD-10-CM

## 2020-08-13 DIAGNOSIS — H44.112 PANUVEITIS OF LEFT EYE: ICD-10-CM

## 2020-08-13 PROCEDURE — G0463 HOSPITAL OUTPT CLINIC VISIT: HCPCS | Mod: ZF

## 2020-08-13 PROCEDURE — 92134 CPTRZ OPH DX IMG PST SGM RTA: CPT | Mod: ZF | Performed by: OPHTHALMOLOGY

## 2020-08-13 ASSESSMENT — CONF VISUAL FIELD
OS_INFERIOR_TEMPORAL_RESTRICTION: 1
OD_NORMAL: 1
OS_SUPERIOR_NASAL_RESTRICTION: 1
OS_INFERIOR_NASAL_RESTRICTION: 1
OS_SUPERIOR_TEMPORAL_RESTRICTION: 3

## 2020-08-13 ASSESSMENT — SLIT LAMP EXAM - LIDS: COMMENTS: DERMATOCHALASIS, BLEPHARITIS

## 2020-08-13 ASSESSMENT — VISUAL ACUITY
OD_SC: 20/30
OS_SC: CF @ 1'
METHOD: SNELLEN - LINEAR

## 2020-08-13 ASSESSMENT — EXTERNAL EXAM - LEFT EYE: OS_EXAM: NORMAL

## 2020-08-13 ASSESSMENT — REFRACTION_MANIFEST
OS_SPHERE: BALANCE
OD_AXIS: 005
OD_CYLINDER: +0.25
OD_SPHERE: PLANO

## 2020-08-13 ASSESSMENT — TONOMETRY
OS_IOP_MMHG: 06
OD_IOP_MMHG: 15
IOP_METHOD: TONOPEN

## 2020-08-13 ASSESSMENT — EXTERNAL EXAM - RIGHT EYE: OD_EXAM: NORMAL

## 2020-08-13 NOTE — NURSING NOTE
Chief Complaint(s) and History of Present Illness(es)     Post Op (Ophthalmology) Right Eye     In right eye.  Associated symptoms include Negative for dryness, eye pain, redness and tearing.  Pain was noted as 0/10.              Comments     4 week post op Complex CE/IOL RE (07/14/2020). Vision is slowly improving in the RE. Pt notes she is able to see colors more, when before everything was grey.     Ocular meds:   Pred BID RE  Ketorolac TID RE  Maxitrol Oneil at bedtime RE    Arlen Angelo, COLT 8:52 AM August 13, 2020

## 2020-08-13 NOTE — PROGRESS NOTES
Postoperative month #1 status post RIGHT EYE COMPLEX PHACOEMULSIFICATION, CATARACT, WITH INTRAOCULAR LENS IMPLANT  7-14-20    Sutures in place  Retina attached  Doing well  Shanita neg    OCULAR IMAGING  Optical Coherence Tomography 8-13-20  right eye: good foveal contour  left eye: retina atrophic changes. Mild inf to macula subretinal fluid     Plan:  Taper Predforte (pink top) 3/2/1 weekly, then stop (shake the bottle before)  Stop Ofloxacin (tan top) four times a day  X 1 week and stop  Stop Maxitrol ointment at bedtime   Put the eyedrops 5 minutes a part  Eye shield or glasses at all times x 3 weeks  Sleep with the shield  No heavy lifting   intraocular pressure normalized; negative shanita - possibly cut sutures at Postoperative month#1 appointment  Retina detachment and endophthalmitis precautions were discussed with the patient (increased blurry vision, drainage, new flashes, floaters or a curtain in the visual field) and was asked to return if any of the those occur    Follow up in 3 weeks with prescription, Optical Coherence Tomography and dilation  Possible K suture removal   ~~~~~~~~~~~~~~~~~~~~~~~~~~~~~~~~~~   Complete documentation of historical and exam elements from today's encounter can be found in the full encounter summary report (not reduplicated in this progress note).  I personally obtained the chief complaint(s) and history of present illness.  I confirmed and edited as necessary the review of systems, past medical/surgical history, family history, social history, and examination findings as documented by others; and I examined the patient myself.  I personally reviewed the relevant tests, images, and reports as documented above.  I formulated and edited as necessary the assessment and plan and discussed the findings and management plan with the patient and family    Carolee Garza MD   of Ophthalmology.  Retina Service   Department of Ophthalmology and Visual  Neurosciences   [facility]  Phone: [phone]   Fax: [phone]

## 2020-10-28 DIAGNOSIS — H33.052 OLD TOTAL RETINAL DETACHMENT OF LEFT EYE: Primary | ICD-10-CM

## 2020-10-29 ENCOUNTER — OFFICE VISIT (OUTPATIENT)
Dept: OPHTHALMOLOGY | Facility: CLINIC | Age: 75
End: 2020-10-29
Attending: OPHTHALMOLOGY
Payer: MEDICARE

## 2020-10-29 DIAGNOSIS — Z48.810 AFTERCARE FOLLOWING SURGERY OF A SENSE ORGAN: ICD-10-CM

## 2020-10-29 DIAGNOSIS — H26.9 NUCLEAR CATARACT, NONSENILE: ICD-10-CM

## 2020-10-29 DIAGNOSIS — H33.052 OLD TOTAL RETINAL DETACHMENT OF LEFT EYE: ICD-10-CM

## 2020-10-29 PROCEDURE — G0463 HOSPITAL OUTPT CLINIC VISIT: HCPCS

## 2020-10-29 PROCEDURE — 92015 DETERMINE REFRACTIVE STATE: CPT

## 2020-10-29 PROCEDURE — 99024 POSTOP FOLLOW-UP VISIT: CPT | Performed by: OPHTHALMOLOGY

## 2020-10-29 PROCEDURE — 92134 CPTRZ OPH DX IMG PST SGM RTA: CPT | Performed by: OPHTHALMOLOGY

## 2020-10-29 RX ORDER — MOXIFLOXACIN 5 MG/ML
1 SOLUTION/ DROPS OPHTHALMIC 3 TIMES DAILY
Qty: 1 BOTTLE | Refills: 0 | Status: SHIPPED | OUTPATIENT
Start: 2020-10-29

## 2020-10-29 ASSESSMENT — VISUAL ACUITY
OD_SC: 20/30
METHOD: SNELLEN - LINEAR

## 2020-10-29 ASSESSMENT — TONOMETRY
OS_IOP_MMHG: 06
OD_IOP_MMHG: 10
IOP_METHOD: TONOPEN

## 2020-10-29 ASSESSMENT — CONF VISUAL FIELD
OS_SUPERIOR_TEMPORAL_RESTRICTION: 3
OS_SUPERIOR_NASAL_RESTRICTION: 1
METHOD: COUNTING FINGERS
OS_INFERIOR_NASAL_RESTRICTION: 1
OD_SUPERIOR_NASAL_RESTRICTION: 3
OS_INFERIOR_TEMPORAL_RESTRICTION: 3

## 2020-10-29 ASSESSMENT — REFRACTION_MANIFEST
OD_ADD: +2.75
OD_AXIS: 035
OD_SPHERE: -0.50
OD_CYLINDER: +0.25
OS_SPHERE: BALANCE

## 2020-10-29 ASSESSMENT — EXTERNAL EXAM - RIGHT EYE: OD_EXAM: NORMAL

## 2020-10-29 ASSESSMENT — SLIT LAMP EXAM - LIDS: COMMENTS: DERMATOCHALASIS, BLEPHARITIS

## 2020-10-29 ASSESSMENT — EXTERNAL EXAM - LEFT EYE: OS_EXAM: NORMAL

## 2020-10-29 NOTE — NURSING NOTE
Chief Complaints and History of Present Illnesses   Patient presents with     Follow Up     Chief Complaint(s) and History of Present Illness(es)     Follow Up     Laterality: both eyes    Associated symptoms: photophobia.  Negative for floaters and flashes    Treatments tried: no treatments    Pain scale: 0/10              Comments     Follow up status post RIGHT EYE COMPLEX PHACOEMULSIFICATION, CATARACT, WITH INTRAOCULAR LENS IMPLANT  7-14-20.    The patient notes that her vision is good in the right eye.  She enjoys seeing colors.  The patient has light sensitivity both indoors and outdoors.  She notes she turns lights out inside her house.   Coral Bermeo, COA, COA 9:15 AM 10/29/2020

## 2020-10-29 NOTE — PROGRESS NOTES
CC: follow up RIGHT EYE COMPLEX PHACOEMULSIFICATION, CATARACT, WITH INTRAOCULAR LENS IMPLANT  7-14-20    Sutures in place  Retina attached  Doing well  Shanita neg    Cornea suture removal:  1gtt proparacaine given  1gtt of betadine 5% administered  Cornea suture removed without complications  1gtt of betadine 5% administered    Plan:  Vigamox drops four times a day  For 3 days      OCULAR IMAGING  Optical Coherence Tomography 8-13-20  right eye: good foveal contour  left eye: retina atrophic changes. Mild inf to macula subretinal fluid - stable; no progression  Continue to observe    Plan:  intraocular pressure normalized; negative shanita - possibly cut sutures at Postoperative month#1 appointment  Retina detachment and endophthalmitis precautions were discussed with the patient (increased blurry vision, drainage, new flashes, floaters or a curtain in the visual field) and was asked to return if any of the those occur    Follow up in 3 months with Optical Coherence Tomography and dilation  Possible K suture removal   Prescription given   ~~~~~~~~~~~~~~~~~~~~~~~~~~~~~~~~~~   Complete documentation of historical and exam elements from today's encounter can be found in the full encounter summary report (not reduplicated in this progress note).  I personally obtained the chief complaint(s) and history of present illness.  I confirmed and edited as necessary the review of systems, past medical/surgical history, family history, social history, and examination findings as documented by others; and I examined the patient myself.  I personally reviewed the relevant tests, images, and reports as documented above.  I formulated and edited as necessary the assessment and plan and discussed the findings and management plan with the patient and family    Carolee Garza MD   of Ophthalmology.  Retina Service   Department of Ophthalmology and Visual Neurosciences   [facility]  Phone: [phone]   Fax: [phone]

## 2021-01-18 DIAGNOSIS — H33.052 OLD TOTAL RETINAL DETACHMENT OF LEFT EYE: Primary | ICD-10-CM

## 2023-10-31 NOTE — ANESTHESIA PREPROCEDURE EVALUATION
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]   Procedure(s):  RIGHT EYE PHACOEMULSIFICATION, CATARACT, WITH INTRAOCULAR LENS IMPLANT     LABS:  CBC:   Lab Results   Component Value Date    WBC 9.5 2019    WBC 12.2 (H) 2019    HGB 8.3 (L) 2019    HGB 8.1 (L) 2019    HCT 28.0 (L) 2019    HCT 27.1 (L) 2019     2019     2019     BMP:   Lab Results   Component Value Date     2019     2019    POTASSIUM 3.7 2019    POTASSIUM 3.8 2019    CHLORIDE 111 (H) 2019    CHLORIDE 110 (H) 2019    CO2 28 2019    CO2 25 2019    BUN 15 2019    BUN 16 2019    CR 0.89 2019    CR 0.89 2019    GLC 88 2019    GLC 86 2019     COAGS:   Lab Results   Component Value Date    INR 1.09 2019     POC: No results found for: BGM, HCG, HCGS  OTHER:   Lab Results   Component Value Date    LACT 0.7 2019    MAGGY 7.4 (L) 2019    ALBUMIN 1.8 (L) 2019    PROTTOTAL 4.3 (L) 2019    ALT 20 2019    AST 8 2019    ALKPHOS 74 2019    BILITOTAL 1.0 2019    TSH 0.82 2015        Preop Vitals    BP Readings from Last 3 Encounters:   20 (!) 144/81   19 (!) 140/75   19 126/77    Pulse Readings from Last 3 Encounters:   20 65   19 81   19 82      Resp Readings from Last 3 Encounters:   20 16   19 18   09/26/19 20    SpO2 Readings from Last 3 Encounters:   20 98%   19 95%   19 97%      Temp Readings from Last 1 Encounters:   20 37.1  C (98.7  F) (Oral)    Ht Readings from Last 1 Encounters:   20 1.6 m (5' 3\")      Wt Readings from Last 1 Encounters:   20 78 kg (172 lb)    Estimated body mass index is 30.47 kg/m  as " "calculated from the following:    Height as of this encounter: 1.6 m (5' 3\").    Weight as of this encounter: 78 kg (172 lb).     LDA:        Past Medical History:   Diagnosis Date     Allergic rhinitis      Arthropathy      Breast cancer (H) 2012     Glaucoma      HTN, goal below 140/90      Intestinal malabsorption      Scleritis       Past Surgical History:   Procedure Laterality Date     APPENDECTOMY       ARTHROSCOPY KNEE       C BREAST RECONSTRUCT W TRAM 1 PEDICL       C TOTAL KNEE ARTHROPLASTY  2008    Bilateral     CHOLECYSTECTOMY       HYSTERECTOMY TOTAL ABDOMINAL      Benign      MASTECTOMY  2012     VITRECTOMY PARSPLANA WITH 25 GAUGE SYSTEM Left 6/17/2019    Procedure: LEFT EYE 25 GAUGE VITRECTOMY, PARS PLANA LENSECTOMY, MEMBRANE PEEL,  ENDOLASER, POSTERIOR SYNECHIOLYSIS, IRIDOTOMY, AIR FLUID EXCHANGE, INFUSION SILICONE OIL;  Surgeon: Carolee Garza MD;  Location:  [facility]     VITRECTOMY PARSPLANA WITH 25 GAUGE SYSTEM Left 8/2/2019    Procedure: Left Eye 25 Gauge [place] Vitrectomy, Silicone Oil Removal, Membrane Peel, Retinectomy, Endolaser, Air/f;uid exchange, Infusion of 1000 Silicone Oil;  Surgeon: Carolee Garza MD;  Location:  [facility]     VITRECTOMY PARSPLANA WITH 25 GAUGE SYSTEM Left 9/27/2019    Procedure: Left eye 25 Gauge [place] Vitrectomy, Silisone Oil Removal, Membrane Peel, Endolaser, Air/fluid Exchange, Infusion of 1000 Silicone Oil;  Surgeon: Carolee Garza MD;  Location: [facility] OR      Allergies   Allergen Reactions     Cephalexin      Other reaction(s): Sedation     Codeine Sulfate      Diatrizoate Anaphylaxis     Ibuprofen Sodium      Iodamide      Ivp Dye [Contrast Dye]      Lactose Anaphylaxis     No Clinical Screening - See Comments Anaphylaxis     flu shot     Sulfamethoxazole-Trimethoprim Hives     Other reaction(s): Sedation     Bactrim [Sulfamethoxazole W/Trimethoprim] Hives     Codeine      Other reaction(s): GI Bleeding  Can take hydrocodone     " Influenza Virus Vaccine [id number]      Iodine      Other reaction(s): Agitation     Sulfa Drugs Hives     Thimerosal      Other reaction(s): GI Upset     Tramadol Hives     hives        Anesthesia Evaluation     . Pt has had prior anesthetic. Type: General and MAC    No history of anesthetic complications          ROS/MED HX    ENT/Pulmonary:     (+)allergic rhinitis, , . .    Neurologic:       Cardiovascular:     (+) hypertension----. : . . . :. .       METS/Exercise Tolerance:     Hematologic:         Musculoskeletal:         GI/Hepatic:         Renal/Genitourinary:         Endo:         Psychiatric:         Infectious Disease:         Malignancy:   (+) Malignancy History of Breast          Other:                     JJOEG [facility] AN PHYSICAL EXAM    Assessment:   ASA SCORE: 3            Plan:   Anes. Type:  MAC   Pre-Medication: None   Induction:  N/a   Airway: Native Airway   Access/Monitoring: PIV   Maintenance: N/a     Postop Plan:   Postop Pain: None  Postop Sedation/Airway: Not planned     PONV Management: Adult Risk Factors: Female   Prevention: Ondansetron, Propofol                   Bhavani Reardon MD   MODERATE

## (undated) DEVICE — EYE PACK 25GA CONSTELLATION 10,000 CPM PPK9380-02

## (undated) DEVICE — SYR 03ML LL W/O NDL 309657

## (undated) DEVICE — EYE KNIFE SLIT XSTAR VISITEC 2.5MM 45DEG BEVEL UP 373725

## (undated) DEVICE — EYE PACK CONSTELLATION VFC 8065750957

## (undated) DEVICE — Device

## (undated) DEVICE — EYE BLADE SCLERAL MVR 20GA 8065912001

## (undated) DEVICE — EYE CANN IRR 25GA CYSTOTOME 581610

## (undated) DEVICE — EYE SHIELD PLASTIC

## (undated) DEVICE — TUBING SUCTION 12"X1/4" N612

## (undated) DEVICE — EYE NDL RETROBULBAR 25GA 1.5" 581275

## (undated) DEVICE — TAPE MICROPORE 2"X1.5YD 1530S-2

## (undated) DEVICE — GLOVE PROTEXIS MICRO 7.0  2D73PM70

## (undated) DEVICE — SYR 10ML LL W/O NDL 302995

## (undated) DEVICE — DRAPE MICRO 41X81"

## (undated) DEVICE — NDL 19GA 1.5" FILTER 305200

## (undated) DEVICE — SU VICRYL 7-0 TG140-8DA 18" J546G

## (undated) DEVICE — EYE PROBE LASER 25GA FLEX RFID 8065751114

## (undated) DEVICE — EYE NDL RETROBULBAR ATKINSON 25GA 1.5" 581637

## (undated) DEVICE — SU ETHILON 10-0 CS160-6 12" 9000G

## (undated) DEVICE — EYE CANN SOFT TIP 25GA FOR VALVED SET 8065149530

## (undated) DEVICE — PACK CATARACT CUSTOM ASC SEY15CPUMC

## (undated) DEVICE — LINEN TOWEL PACK X5 5464

## (undated) DEVICE — GLOVE PROTEXIS MICRO 6.0  2D73PM60

## (undated) DEVICE — NDL 18GA 1.5" 305196

## (undated) DEVICE — EYE MARKING PAD 581057

## (undated) DEVICE — PACK VITRECTOMY PQ15VI57E

## (undated) DEVICE — EYE LENS VITRECTOMY MAGNIFYING ODVM

## (undated) DEVICE — ESU CORD BIPOLAR GREEN 10-4000

## (undated) DEVICE — SU PLAIN 6-0 TG140-8 18" 1735G

## (undated) DEVICE — EYE KIT AUTO GAS FOR CONSTELLATION 8065751014

## (undated) DEVICE — EYE KIT TUBING VISCOUS FLUID CONTROL LATEX 8065750118

## (undated) DEVICE — TAPE MICROPORE 1"X1.5YD 1530S-1

## (undated) DEVICE — SYR 05ML SLIP TIP W/O NDL 309647

## (undated) DEVICE — EYE SOL BSS 15ML BOTTLE 65079515

## (undated) DEVICE — EYE PERFLUORON KIT 5ML 8065900163

## (undated) DEVICE — EYE FORCEP TIP MAX GRIP ADV DISP 25GA 725.13

## (undated) DEVICE — EYE KNIFE STILETTO VISITEC 1.1MM ANG 45DEG SIDEPORT 376620

## (undated) DEVICE — EYE CANN IRR 30GA  ANTERIOR CHAMBER 581273

## (undated) DEVICE — EYE SOL BSS 500ML

## (undated) DEVICE — EYE PACK CUSTOM ANTERIOR 30DEG TIP CENTURION PPK6682-04

## (undated) DEVICE — SYR 05ML SLIP TIP W/O NDL

## (undated) DEVICE — SYR 01ML 25GA 5/8" TBC

## (undated) DEVICE — EYE TIP IRRIGATION & ASPIRATION POLYMER 35D BENT 8065751511

## (undated) DEVICE — EYE CANNULA SOFT TIP 25GA 8065149525

## (undated) DEVICE — SYR 01ML 27GA 0.5" ECLIPSE 305789

## (undated) DEVICE — EYE FILTER MILLEX 0.22 MICRON

## (undated) DEVICE — EYE TIP BIPOLAR 18GA ERASER 221250

## (undated) DEVICE — SUCTION CANISTER MEDIVAC LINER 1500ML W/LID 65651-515

## (undated) DEVICE — EYE PACK 25GA PLUS CONSTELLATION PPK4253

## (undated) DEVICE — EYE CANN IRR 27GA ANTERIOR CHAMBER 581280

## (undated) DEVICE — NDL 27GA 0.5" 305109

## (undated) DEVICE — DRAPE MICROSCOPE DRAPE  EYE DI40001

## (undated) RX ORDER — PHENYLEPHRINE HYDROCHLORIDE 25 MG/ML
SOLUTION/ DROPS OPHTHALMIC
Status: DISPENSED
Start: 2019-06-17

## (undated) RX ORDER — LIDOCAINE HYDROCHLORIDE 20 MG/ML
INJECTION, SOLUTION EPIDURAL; INFILTRATION; INTRACAUDAL; PERINEURAL
Status: DISPENSED
Start: 2019-08-02

## (undated) RX ORDER — MOXIFLOXACIN IN NACL,ISO-OS/PF 0.3MG/0.3
SYRINGE (ML) INTRAOCULAR
Status: DISPENSED
Start: 2019-08-02

## (undated) RX ORDER — ACETAMINOPHEN 325 MG/1
TABLET ORAL
Status: DISPENSED
Start: 2019-08-02

## (undated) RX ORDER — FENTANYL CITRATE 50 UG/ML
INJECTION, SOLUTION INTRAMUSCULAR; INTRAVENOUS
Status: DISPENSED
Start: 2020-07-14

## (undated) RX ORDER — ACETAMINOPHEN 325 MG/1
TABLET ORAL
Status: DISPENSED
Start: 2020-07-14

## (undated) RX ORDER — FENTANYL CITRATE 50 UG/ML
INJECTION, SOLUTION INTRAMUSCULAR; INTRAVENOUS
Status: DISPENSED
Start: 2019-08-02

## (undated) RX ORDER — CYCLOPENTOLATE HYDROCHLORIDE 10 MG/ML
SOLUTION/ DROPS OPHTHALMIC
Status: DISPENSED
Start: 2019-06-17

## (undated) RX ORDER — DEXAMETHASONE SODIUM PHOSPHATE 4 MG/ML
INJECTION, SOLUTION INTRA-ARTICULAR; INTRALESIONAL; INTRAMUSCULAR; INTRAVENOUS; SOFT TISSUE
Status: DISPENSED
Start: 2019-06-17

## (undated) RX ORDER — VANCOMYCIN HYDROCHLORIDE 1 G/20ML
INJECTION, POWDER, LYOPHILIZED, FOR SOLUTION INTRAVENOUS
Status: DISPENSED
Start: 2019-06-17

## (undated) RX ORDER — LIDOCAINE HYDROCHLORIDE 20 MG/ML
INJECTION, SOLUTION EPIDURAL; INFILTRATION; INTRACAUDAL; PERINEURAL
Status: DISPENSED
Start: 2020-07-14

## (undated) RX ORDER — PROPOFOL 10 MG/ML
INJECTION, EMULSION INTRAVENOUS
Status: DISPENSED
Start: 2019-09-27

## (undated) RX ORDER — DEXAMETHASONE SODIUM PHOSPHATE 4 MG/ML
INJECTION, SOLUTION INTRA-ARTICULAR; INTRALESIONAL; INTRAMUSCULAR; INTRAVENOUS; SOFT TISSUE
Status: DISPENSED
Start: 2019-09-27

## (undated) RX ORDER — PROPOFOL 10 MG/ML
INJECTION, EMULSION INTRAVENOUS
Status: DISPENSED
Start: 2019-06-17

## (undated) RX ORDER — LIDOCAINE HYDROCHLORIDE 20 MG/ML
INJECTION, SOLUTION EPIDURAL; INFILTRATION; INTRACAUDAL; PERINEURAL
Status: DISPENSED
Start: 2019-06-17

## (undated) RX ORDER — TRIAMCINOLONE ACETONIDE 40 MG/ML
INJECTION, SUSPENSION INTRA-ARTICULAR; INTRAMUSCULAR
Status: DISPENSED
Start: 2019-06-17

## (undated) RX ORDER — FENTANYL CITRATE 50 UG/ML
INJECTION, SOLUTION INTRAMUSCULAR; INTRAVENOUS
Status: DISPENSED
Start: 2019-06-17

## (undated) RX ORDER — EPINEPHRINE 1 MG/ML
INJECTION, SOLUTION, CONCENTRATE INTRAVENOUS
Status: DISPENSED
Start: 2019-06-17

## (undated) RX ORDER — ONDANSETRON 2 MG/ML
INJECTION INTRAMUSCULAR; INTRAVENOUS
Status: DISPENSED
Start: 2019-09-27

## (undated) RX ORDER — ONDANSETRON 2 MG/ML
INJECTION INTRAMUSCULAR; INTRAVENOUS
Status: DISPENSED
Start: 2019-08-02

## (undated) RX ORDER — DEXAMETHASONE SODIUM PHOSPHATE 4 MG/ML
INJECTION, SOLUTION INTRA-ARTICULAR; INTRALESIONAL; INTRAMUSCULAR; INTRAVENOUS; SOFT TISSUE
Status: DISPENSED
Start: 2019-08-02

## (undated) RX ORDER — PROPOFOL 10 MG/ML
INJECTION, EMULSION INTRAVENOUS
Status: DISPENSED
Start: 2020-07-14

## (undated) RX ORDER — WATER 10 ML/10ML
INJECTION INTRAMUSCULAR; INTRAVENOUS; SUBCUTANEOUS
Status: DISPENSED
Start: 2019-06-17

## (undated) RX ORDER — LIDOCAINE HYDROCHLORIDE 20 MG/ML
INJECTION, SOLUTION EPIDURAL; INFILTRATION; INTRACAUDAL; PERINEURAL
Status: DISPENSED
Start: 2019-09-27

## (undated) RX ORDER — ONDANSETRON 2 MG/ML
INJECTION INTRAMUSCULAR; INTRAVENOUS
Status: DISPENSED
Start: 2019-06-17

## (undated) RX ORDER — EPHEDRINE SULFATE 50 MG/ML
INJECTION, SOLUTION INTRAMUSCULAR; INTRAVENOUS; SUBCUTANEOUS
Status: DISPENSED
Start: 2019-09-27

## (undated) RX ORDER — FENTANYL CITRATE 50 UG/ML
INJECTION, SOLUTION INTRAMUSCULAR; INTRAVENOUS
Status: DISPENSED
Start: 2019-09-27

## (undated) RX ORDER — PHENYLEPHRINE HCL IN 0.9% NACL 1 MG/10 ML
SYRINGE (ML) INTRAVENOUS
Status: DISPENSED
Start: 2019-08-02

## (undated) RX ORDER — ATROPINE SULFATE 10 MG/ML
SOLUTION/ DROPS OPHTHALMIC
Status: DISPENSED
Start: 2019-06-17

## (undated) RX ORDER — PROPOFOL 10 MG/ML
INJECTION, EMULSION INTRAVENOUS
Status: DISPENSED
Start: 2019-08-02

## (undated) RX ORDER — LIDOCAINE HYDROCHLORIDE 10 MG/ML
INJECTION, SOLUTION EPIDURAL; INFILTRATION; INTRACAUDAL; PERINEURAL
Status: DISPENSED
Start: 2019-06-10